# Patient Record
Sex: FEMALE | Race: WHITE | NOT HISPANIC OR LATINO | Employment: OTHER | URBAN - METROPOLITAN AREA
[De-identification: names, ages, dates, MRNs, and addresses within clinical notes are randomized per-mention and may not be internally consistent; named-entity substitution may affect disease eponyms.]

---

## 2017-12-06 ENCOUNTER — HISTORICAL (OUTPATIENT)
Dept: ADMINISTRATIVE | Facility: HOSPITAL | Age: 72
End: 2017-12-06

## 2018-06-28 ENCOUNTER — HISTORICAL (OUTPATIENT)
Dept: ADMINISTRATIVE | Facility: HOSPITAL | Age: 73
End: 2018-06-28

## 2018-06-30 LAB — FINAL CULTURE: NO GROWTH

## 2018-12-19 ENCOUNTER — HISTORICAL (OUTPATIENT)
Dept: ADMINISTRATIVE | Facility: HOSPITAL | Age: 73
End: 2018-12-19

## 2018-12-19 LAB
ALBUMIN SERPL-MCNC: 3.6 GM/DL (ref 3.4–5)
ALBUMIN/GLOB SERPL: 0.9 RATIO (ref 1.1–2)
ALP SERPL-CCNC: 101 UNIT/L (ref 38–126)
ALT SERPL-CCNC: 29 UNIT/L (ref 12–78)
AST SERPL-CCNC: 22 UNIT/L (ref 15–37)
BILIRUB SERPL-MCNC: 0.3 MG/DL (ref 0.2–1)
BILIRUBIN DIRECT+TOT PNL SERPL-MCNC: 0.1 MG/DL (ref 0–0.5)
BILIRUBIN DIRECT+TOT PNL SERPL-MCNC: 0.2 MG/DL (ref 0–0.8)
BUN SERPL-MCNC: 22 MG/DL (ref 7–18)
CALCIUM SERPL-MCNC: 9 MG/DL (ref 8.5–10.1)
CHLORIDE SERPL-SCNC: 102 MMOL/L (ref 98–107)
CHOLEST SERPL-MCNC: 372 MG/DL (ref 0–200)
CHOLEST/HDLC SERPL: 8.9 {RATIO} (ref 0–4)
CO2 SERPL-SCNC: 28 MMOL/L (ref 21–32)
CREAT SERPL-MCNC: 1.11 MG/DL (ref 0.55–1.02)
GLOBULIN SER-MCNC: 4 GM/DL (ref 2.4–3.5)
GLUCOSE SERPL-MCNC: 138 MG/DL (ref 74–106)
HDLC SERPL-MCNC: 42 MG/DL (ref 35–60)
LDLC SERPL CALC-MCNC: 252 MG/DL (ref 0–129)
POTASSIUM SERPL-SCNC: 4 MMOL/L (ref 3.5–5.1)
PROT SERPL-MCNC: 7.6 GM/DL (ref 6.4–8.2)
SODIUM SERPL-SCNC: 138 MMOL/L (ref 136–145)
T4 FREE SERPL-MCNC: 0.94 NG/DL (ref 0.76–1.46)
TRIGL SERPL-MCNC: 389 MG/DL (ref 30–150)
TSH SERPL-ACNC: 3.17 MIU/L (ref 0.36–3.74)
VLDLC SERPL CALC-MCNC: 78 MG/DL

## 2019-06-10 ENCOUNTER — HISTORICAL (OUTPATIENT)
Dept: ADMINISTRATIVE | Facility: HOSPITAL | Age: 74
End: 2019-06-10

## 2021-07-19 ENCOUNTER — HISTORICAL (OUTPATIENT)
Dept: ADMINISTRATIVE | Facility: HOSPITAL | Age: 76
End: 2021-07-19

## 2021-07-21 LAB — FINAL CULTURE: NO GROWTH

## 2021-08-04 ENCOUNTER — HISTORICAL (OUTPATIENT)
Dept: RADIOLOGY | Facility: HOSPITAL | Age: 76
End: 2021-08-04

## 2021-10-19 ENCOUNTER — HISTORICAL (OUTPATIENT)
Dept: PREADMISSION TESTING | Facility: HOSPITAL | Age: 76
End: 2021-10-19

## 2021-11-16 ENCOUNTER — HISTORICAL (OUTPATIENT)
Dept: PREADMISSION TESTING | Facility: HOSPITAL | Age: 76
End: 2021-11-16

## 2021-11-18 ENCOUNTER — HISTORICAL (OUTPATIENT)
Dept: ADMINISTRATIVE | Facility: HOSPITAL | Age: 76
End: 2021-11-18

## 2022-02-15 ENCOUNTER — HISTORICAL (OUTPATIENT)
Dept: ADMINISTRATIVE | Facility: HOSPITAL | Age: 77
End: 2022-02-15

## 2022-04-09 ENCOUNTER — HISTORICAL (OUTPATIENT)
Dept: ADMINISTRATIVE | Facility: HOSPITAL | Age: 77
End: 2022-04-09
Payer: MEDICARE

## 2022-04-25 VITALS
DIASTOLIC BLOOD PRESSURE: 78 MMHG | BODY MASS INDEX: 29.16 KG/M2 | HEIGHT: 65 IN | SYSTOLIC BLOOD PRESSURE: 122 MMHG | OXYGEN SATURATION: 95 % | WEIGHT: 175 LBS

## 2022-04-30 NOTE — OP NOTE
Patient:   Manisha Kirk            MRN: [id number]            FIN: [id number]               Age:   72 years     Sex:  Female     :  1945   Associated Diagnoses:   None   Author:   Venkatesh Nassar MD      Preoperative Diagnosis: Cataract Left eye    Postoperative Diagnosis: Cataract Left eye    Procedure: Phacoemulsification with intaocular lens implantations Left eye    Surgeon: Venkatesh Nassar MD    Assistant: Lindsay Hager, [facility]    Anestheisa: Topical    Complications: None    The patient was brought to the [facility] and positioned.  A surgical timeout, capsulotomy, lens fragmentation and limbal relaxing incisions were performed.  The patient was brought into the operating suite, where the patient was correctly identified as was the operative eye via timeout.  The patient was prepped and draped in a sterile ophthalmic fashion.  A lid speculum was placed in the operative eye and the microscope was brought into place.  A 1.0mm paracentesis was then made at (6) o'clock.  The anterior chamber was filled with Endocoat.  A (temporal) clear corneal incision was made with a 2.4 mm keratome.  A 6 mm corneal marking ring was used to dieter the cornea centered over the visual axis.  A 5.00 mm continuous curvilinear capsulorhexis was fashioned using a cystotome and microcapsular forceps.  Hydrodissection and hydrodelineation was performed with upreserved 1% Xylocaine.  The nucleus was then phacoemulsified with the Abbott phacoemulsification hand-piece with a total of (0) EFX.  The cortex was then removed with the Regan I/A hand-piece. An AKILAH lens model (ZCB00) with a power of (17.5) was placed in the capsular bag.  The Helon was then removed from the eye with the Regan I/A hand piece.  The anterior chamber was inflated and the wounds were hydrated with BSS.  The wounds were checked with Weck-Sue sponges and found to be watertight.  The lid speculum was removed and topical antibiotics were placed on the  operative eye.  The patient was brought to PACU in good condition.        Surgery Date 12/06/17 [facility]

## 2022-04-30 NOTE — OP NOTE
Patient:   Manisha Kirk            MRN: [id number]            FIN: [id number]               Age:   76 years     Sex:  Female     :  1945   Associated Diagnoses:   None   Author:   David Montoya MD            DATE OF SURGERY:   21    SURGEON:  David Montoya MD    PREOPERATIVE DIAGNOSIS:  Symptomatic Cholelithiasis    POST OPERATIVE DIAGNOSIS:  Same.    PROCEDURE:  Laparoscopic cholecystectomy.    ANESTHESIA:  General endotracheal anesthesia.    ESTIMATED BLOOD LOSS:  Approximately 20 cc.   Blood administered, none.    LAP AND INSTRUMENT COUNT:  Correct X2 at the end of the case.    SPECIMENS:  Gallbladder.    INDICATION AND SIGNIFICANT HISTORY:  Patient is a 76-year-old female complaining of post prandial right upper quadrant pain associated with fatty meals.  Patient was worked up clinically and found to have symptomatic cholelithiasis.  Risks and benefits of surgery was discussed with the patient who voiced understanding of risks / benefits and elected to proceed with surgery.        PROCEDURE IN DETAIL:  Once informed consents were obtained, patient was taken to the operating room and placed supine on the operating table.  After general endotracheal anesthesia was induced, the abdomen was prepped and draped in the standard sterile surgical fashion.  Periumbilical incision was made with the scalpel and the Veress needle was used to enter the abdominal cavity.  Pneumoperitoneum was then created with insufflation.  After adequate insufflation was obtained, 11 millimeter trocar port were placed through this wound.  Two additional 5 millimeter ports were placed in the right upper quadrant followed by 5 millimeter trocar placed subxiphoid.  The gallbladder was then visualized appeared to have chronic inflammatory changes and retracted both superiorly and laterally to achieve the critical view.  Dissection was carried out in lateral to medial fashion.  The cystic duct were first visualized  followed by cystic artery appropriate.  Two clips were placed twice proximally on each structure and one distally and then transected.  The gallbladder was then removed from the liver bed using the hook cautery and passed off the table as specimen through the periumbilical trocar port site.  Attention was then redirected to the gallbladder fossa, no active bleeding was noted.  Good hemostasis was visualized.  All ports were then removed under direct visualization with no active bleeding noted.  Skin was then closed with 4-0 Vicryl suture in interrupted fashion.  Skin was  cleaned and dry sterile dressing was placed on the wound.  Lap and instrument  counts were correct X2 at the end of the case.  Patient tolerated the procedure well and was transferred to recovery room in good condition.

## 2022-08-16 DIAGNOSIS — E03.9 HYPOTHYROIDISM, UNSPECIFIED TYPE: Primary | ICD-10-CM

## 2022-08-16 RX ORDER — LEVOTHYROXINE SODIUM 25 UG/1
25 TABLET ORAL DAILY
Qty: 90 TABLET | Refills: 0 | Status: SHIPPED | OUTPATIENT
Start: 2022-08-16 | End: 2023-08-08

## 2022-08-23 ENCOUNTER — TELEPHONE (OUTPATIENT)
Dept: INTERNAL MEDICINE | Facility: CLINIC | Age: 77
End: 2022-08-23
Payer: MEDICARE

## 2022-08-23 DIAGNOSIS — E78.5 HYPERLIPIDEMIA, UNSPECIFIED HYPERLIPIDEMIA TYPE: ICD-10-CM

## 2022-08-23 DIAGNOSIS — I10 HYPERTENSION, UNSPECIFIED TYPE: ICD-10-CM

## 2022-08-23 DIAGNOSIS — Z00.00 WELLNESS EXAMINATION: Primary | ICD-10-CM

## 2022-08-23 DIAGNOSIS — E66.9 OBESITY, UNSPECIFIED CLASSIFICATION, UNSPECIFIED OBESITY TYPE, UNSPECIFIED WHETHER SERIOUS COMORBIDITY PRESENT: ICD-10-CM

## 2022-08-23 DIAGNOSIS — E06.3 AUTOIMMUNE THYROIDITIS: ICD-10-CM

## 2022-08-23 DIAGNOSIS — R73.01 IFG (IMPAIRED FASTING GLUCOSE): ICD-10-CM

## 2022-08-23 NOTE — TELEPHONE ENCOUNTER
----- Message from Babs Acsota Patient Care Assistant sent at 8/23/2022 11:09 AM CDT -----  Regarding: RE: shemar wilhelm 8/30 @ 2:20  Pt. Confirmed appointment an fasting labs.  ----- Message -----  From: Edilberto John LPN  Sent: 8/23/2022  10:35 AM CDT  To: Babs Acosta Patient Care Assistant  Subject: shemar wilhelm 8/30 @ 2:20                          Are there any outstanding tasks in patient chart? Needs fasting labs    Is there documentation of outstanding tasks in patient chart? no    Has patient been to the ER, urgent care, or another physician since last visit?    Has patient done any blood work or x-rays since last visit?

## 2022-08-29 ENCOUNTER — LAB VISIT (OUTPATIENT)
Dept: LAB | Facility: HOSPITAL | Age: 77
End: 2022-08-29
Attending: INTERNAL MEDICINE
Payer: MEDICARE

## 2022-08-29 DIAGNOSIS — Z00.00 WELLNESS EXAMINATION: ICD-10-CM

## 2022-08-29 DIAGNOSIS — R73.01 IFG (IMPAIRED FASTING GLUCOSE): ICD-10-CM

## 2022-08-29 DIAGNOSIS — E78.5 HYPERLIPIDEMIA, UNSPECIFIED HYPERLIPIDEMIA TYPE: ICD-10-CM

## 2022-08-29 DIAGNOSIS — I10 HYPERTENSION, UNSPECIFIED TYPE: ICD-10-CM

## 2022-08-29 DIAGNOSIS — E66.9 OBESITY, UNSPECIFIED CLASSIFICATION, UNSPECIFIED OBESITY TYPE, UNSPECIFIED WHETHER SERIOUS COMORBIDITY PRESENT: ICD-10-CM

## 2022-08-29 DIAGNOSIS — E06.3 AUTOIMMUNE THYROIDITIS: ICD-10-CM

## 2022-08-29 DIAGNOSIS — K44.9 HIATAL HERNIA: Primary | ICD-10-CM

## 2022-08-29 LAB
ALBUMIN SERPL-MCNC: 3.8 GM/DL (ref 3.4–4.8)
ALBUMIN/GLOB SERPL: 1.1 RATIO (ref 1.1–2)
ALP SERPL-CCNC: 82 UNIT/L (ref 40–150)
ALT SERPL-CCNC: 16 UNIT/L (ref 0–55)
AST SERPL-CCNC: 19 UNIT/L (ref 5–34)
BASOPHILS # BLD AUTO: 0.06 X10(3)/MCL (ref 0–0.2)
BASOPHILS NFR BLD AUTO: 0.6 %
BILIRUBIN DIRECT+TOT PNL SERPL-MCNC: 0.4 MG/DL
BUN SERPL-MCNC: 24.5 MG/DL (ref 9.8–20.1)
CALCIUM SERPL-MCNC: 9.9 MG/DL (ref 8.4–10.2)
CHLORIDE SERPL-SCNC: 104 MMOL/L (ref 98–107)
CHOLEST SERPL-MCNC: 257 MG/DL
CHOLEST/HDLC SERPL: 5 {RATIO} (ref 0–5)
CO2 SERPL-SCNC: 24 MMOL/L (ref 23–31)
CREAT SERPL-MCNC: 1 MG/DL (ref 0.55–1.02)
EOSINOPHIL # BLD AUTO: 0.31 X10(3)/MCL (ref 0–0.9)
EOSINOPHIL NFR BLD AUTO: 3.3 %
ERYTHROCYTE [DISTWIDTH] IN BLOOD BY AUTOMATED COUNT: 12.6 % (ref 11.5–17)
EST. AVERAGE GLUCOSE BLD GHB EST-MCNC: 128.4 MG/DL
GFR SERPLBLD CREATININE-BSD FMLA CKD-EPI: 58 MLS/MIN/1.73/M2
GLOBULIN SER-MCNC: 3.6 GM/DL (ref 2.4–3.5)
GLUCOSE SERPL-MCNC: 133 MG/DL (ref 82–115)
HBA1C MFR BLD: 6.1 %
HCT VFR BLD AUTO: 45.2 % (ref 37–47)
HDLC SERPL-MCNC: 51 MG/DL (ref 35–60)
HGB BLD-MCNC: 15.3 GM/DL (ref 12–16)
IMM GRANULOCYTES # BLD AUTO: 0.05 X10(3)/MCL (ref 0–0.04)
IMM GRANULOCYTES NFR BLD AUTO: 0.5 %
LDLC SERPL CALC-MCNC: 136 MG/DL (ref 50–140)
LYMPHOCYTES # BLD AUTO: 3.11 X10(3)/MCL (ref 0.6–4.6)
LYMPHOCYTES NFR BLD AUTO: 32.8 %
MCH RBC QN AUTO: 30.1 PG (ref 27–31)
MCHC RBC AUTO-ENTMCNC: 33.8 MG/DL (ref 33–36)
MCV RBC AUTO: 88.8 FL (ref 80–94)
MONOCYTES # BLD AUTO: 0.68 X10(3)/MCL (ref 0.1–1.3)
MONOCYTES NFR BLD AUTO: 7.2 %
NEUTROPHILS # BLD AUTO: 5.3 X10(3)/MCL (ref 2.1–9.2)
NEUTROPHILS NFR BLD AUTO: 55.6 %
NRBC BLD AUTO-RTO: 0 %
PLATELET # BLD AUTO: 253 X10(3)/MCL (ref 130–400)
PMV BLD AUTO: 11 FL (ref 7.4–10.4)
POTASSIUM SERPL-SCNC: 4 MMOL/L (ref 3.5–5.1)
PROT SERPL-MCNC: 7.4 GM/DL (ref 5.8–7.6)
RBC # BLD AUTO: 5.09 X10(6)/MCL (ref 4.2–5.4)
SODIUM SERPL-SCNC: 138 MMOL/L (ref 136–145)
T4 FREE SERPL-MCNC: 0.86 NG/DL (ref 0.7–1.48)
TRIGL SERPL-MCNC: 350 MG/DL (ref 37–140)
TSH SERPL-ACNC: 3.94 UIU/ML (ref 0.35–4.94)
VLDLC SERPL CALC-MCNC: 70 MG/DL
WBC # SPEC AUTO: 9.5 X10(3)/MCL (ref 4.5–11.5)

## 2022-08-29 PROCEDURE — 36415 COLL VENOUS BLD VENIPUNCTURE: CPT

## 2022-08-29 PROCEDURE — 85025 COMPLETE CBC W/AUTO DIFF WBC: CPT

## 2022-08-29 PROCEDURE — 80053 COMPREHEN METABOLIC PANEL: CPT

## 2022-08-29 PROCEDURE — 84443 ASSAY THYROID STIM HORMONE: CPT

## 2022-08-29 PROCEDURE — 83036 HEMOGLOBIN GLYCOSYLATED A1C: CPT

## 2022-08-29 PROCEDURE — 80061 LIPID PANEL: CPT

## 2022-08-29 PROCEDURE — 84439 ASSAY OF FREE THYROXINE: CPT

## 2022-08-30 ENCOUNTER — OFFICE VISIT (OUTPATIENT)
Dept: INTERNAL MEDICINE | Facility: CLINIC | Age: 77
End: 2022-08-30
Payer: MEDICARE

## 2022-08-30 VITALS
HEIGHT: 64 IN | RESPIRATION RATE: 18 BRPM | OXYGEN SATURATION: 98 % | SYSTOLIC BLOOD PRESSURE: 130 MMHG | BODY MASS INDEX: 28.17 KG/M2 | WEIGHT: 165 LBS | DIASTOLIC BLOOD PRESSURE: 86 MMHG | HEART RATE: 81 BPM

## 2022-08-30 DIAGNOSIS — R10.13 EPIGASTRIC PAIN: ICD-10-CM

## 2022-08-30 DIAGNOSIS — R11.2 INTRACTABLE VOMITING WITH NAUSEA, UNSPECIFIED VOMITING TYPE: ICD-10-CM

## 2022-08-30 DIAGNOSIS — E03.9 HYPOTHYROIDISM, UNSPECIFIED TYPE: ICD-10-CM

## 2022-08-30 DIAGNOSIS — E78.5 HYPERLIPIDEMIA, UNSPECIFIED HYPERLIPIDEMIA TYPE: ICD-10-CM

## 2022-08-30 DIAGNOSIS — Z00.00 ANNUAL PHYSICAL EXAM: Primary | ICD-10-CM

## 2022-08-30 DIAGNOSIS — R11.0 NAUSEA: ICD-10-CM

## 2022-08-30 PROCEDURE — G0438 PPPS, INITIAL VISIT: HCPCS | Mod: ,,, | Performed by: INTERNAL MEDICINE

## 2022-08-30 PROCEDURE — G0438 PR WELCOME MEDICARE ANNUAL WELLNESS INITIAL VISIT: ICD-10-PCS | Mod: ,,, | Performed by: INTERNAL MEDICINE

## 2022-08-30 RX ORDER — SUCRALFATE 1 G/1
1 TABLET ORAL 4 TIMES DAILY
Qty: 28 TABLET | Refills: 0 | Status: SHIPPED | OUTPATIENT
Start: 2022-08-30 | End: 2022-09-06

## 2022-08-30 RX ORDER — PANTOPRAZOLE SODIUM 20 MG/1
20 TABLET, DELAYED RELEASE ORAL DAILY
COMMUNITY
Start: 2022-07-29 | End: 2022-10-27

## 2022-08-30 RX ORDER — ESTROGENS, CONJUGATED 0.45 MG/1
0.45 TABLET, FILM COATED ORAL DAILY
COMMUNITY
Start: 2022-07-21 | End: 2022-11-11

## 2022-08-30 RX ORDER — AMLODIPINE BESYLATE 5 MG/1
5 TABLET ORAL DAILY
COMMUNITY
Start: 2022-07-28

## 2022-08-30 RX ORDER — ONDANSETRON 4 MG/1
4 TABLET, FILM COATED ORAL EVERY 8 HOURS PRN
Qty: 20 TABLET | Refills: 0 | Status: SHIPPED | OUTPATIENT
Start: 2022-08-30 | End: 2023-12-07

## 2022-08-30 RX ORDER — DICLOFENAC SODIUM 50 MG/1
50 TABLET, DELAYED RELEASE ORAL
COMMUNITY
Start: 2022-02-04 | End: 2023-09-06

## 2022-08-30 NOTE — PROGRESS NOTES
Patient ID: Manisha Kirk is a 77 y.o. female.    Chief Complaint: Medicare AWV (Labs 8/29) and Nausea      Patient Care Team:  Piter Nolan II, MD as PCP - General (Internal Medicine)     HPI:   Patient presents here today for above reason.     Manisha is a 67-year-old female presented to the annual visit history hyperlipidemia placed on Crestor but her cholesterol down nicely although still not at goal but she has dropped her total cholesterol and also LDL approximately 120 points she is tolerating the medication currently.  Rest of her age-appropriate screening is up-to-date.  She does mention that she has been dealing with some chronic nausea for the last 3 or 4 months that began when her gallbladder was acting up.  She is status post lap shawn was doing well for couple months the symptoms do not return.  She has had a 15 lb weight loss because of the continued nausea have.  She is not weanable lot.  She is now complaining of some mid epigastric abdominal pain nonradiating.  She does have some nausea associated with dry heaves does not vomit any material.  Also tremendous amount of bloating and burping.    The patient's Health Maintenance was reviewed and the following appears to be due at this time:   Health Maintenance Due   Topic Date Due    Hepatitis C Screening  Never done    TETANUS VACCINE  Never done    Shingles Vaccine (1 of 2) Never done    COVID-19 Vaccine (3 - Booster for Pfizer series) 02/07/2022        Past Medical History:  Past Medical History:   Diagnosis Date    Autoimmune thyroiditis     HLD (hyperlipidemia)      Past Surgical History:   Procedure Laterality Date    CATARACT EXTRACTION      CHOLECYSTECTOMY      SURGICAL REMOVAL OF BONE SPUR      TONSILLECTOMY      TOTAL ABDOMINAL HYSTERECTOMY       Review of patient's allergies indicates:   Allergen Reactions    Codeine      Other reaction(s): Vomiting    Penicillins      Other reaction(s): Vomiting     Current Outpatient Medications  on File Prior to Visit   Medication Sig Dispense Refill    amLODIPine (NORVASC) 5 MG tablet Take 5 mg by mouth once daily.      levothyroxine (SYNTHROID) 25 MCG tablet Take 1 tablet (25 mcg total) by mouth once daily. 90 tablet 0    pantoprazole (PROTONIX) 20 MG tablet Take 20 mg by mouth once daily.      PREMARIN 0.45 mg tablet Take 0.45 mg by mouth once daily.      rosuvastatin (CRESTOR) 20 MG tablet TAKE 1 TABLET BY MOUTH DAILY 90 tablet 3    diclofenac (VOLTAREN) 50 MG EC tablet Take 50 mg by mouth.       No current facility-administered medications on file prior to visit.     Social History     Socioeconomic History    Marital status:    Tobacco Use    Smoking status: Never    Smokeless tobacco: Never   Substance and Sexual Activity    Alcohol use: Never    Drug use: Never     Social Determinants of Health     Financial Resource Strain: Low Risk     Difficulty of Paying Living Expenses: Not hard at all   Food Insecurity: No Food Insecurity    Worried About Running Out of Food in the Last Year: Never true    Ran Out of Food in the Last Year: Never true   Transportation Needs: No Transportation Needs    Lack of Transportation (Medical): No    Lack of Transportation (Non-Medical): No   Stress: No Stress Concern Present    Feeling of Stress : Not at all   Housing Stability: Low Risk     Unable to Pay for Housing in the Last Year: No    Number of Places Lived in the Last Year: 1    Unstable Housing in the Last Year: No     History reviewed. No pertinent family history.    ROS:   Review of Systems  Constitutional: No weight gain, No fever, No chills, No fatigue.   Eyes: No blurring, No visual disturbances.   Ear/Nose/Mouth/Throat: No decreased hearing, No ear pain, No nasal congestion, No sore throat.   Respiratory: No shortness of breath, No cough, No wheezing.   Cardiovascular: No chest pain, No palpitations, No peripheral edema.   Gastrointestinal: No nausea, No vomiting, No diarrhea, No constipation, No  abdominal pain.   Genitourinary: No dysuria, No hematuria.   Hematology/Lymphatics: No bruising tendency, No bleeding tendency, No swollen lymph glands.   Endocrine: No excessive thirst, No polyuria, No excessive hunger.   Musculoskeletal: No joint pain, No muscle pain, No decreased range of motion.   Integumentary: No rash, No pruritus.   Neurologic: No abnormal balance, No confusion, No headache.   Psychiatric: No anxiety, No depression, Not suicidal, No hallucinations.        Patient Reported Health Risk Assessment  What [age] your age?: 70-79  Are you male or female?: Female  During the past four weeks, how much have you been bothered by emotional problems such as feeling anxious, depressed, irritable, sad, or downhearted and blue?: Not at all  During the past five weeks, has your physical and/or emotional health limited your social activities with family, friends, neighbors, or groups?: Not at all  During the past four weeks, how much bodily pain have you generally had?: No pain  During the past four weeks, was someone available to help if you needed and wanted help?: Yes, as much as I wanted  During the past four weeks, what was the hardest physical activity you could do for at least two minutes?: Light  Can you get to places out of walking distance without help?  (For example, can you travel alone on buses or taxis, or drive your own car?): Yes  Can you go shopping for groceries or clothes without someone's help?: Yes  Can you prepare your own meals?: Yes  Can you do your own housework without help?: Yes  Because of any health problems, do you need the help of another person with your personal care needs such as eating, bathing, dressing, or getting around the house?: No  Can you handle your own money without help?: Yes  During the past four weeks, how would you rate your health in general?: Excellent  How have things been going for you during the past four weeks?: Very well  Are you having difficulties driving  "your car?: No  Do you always fasten your seat belt when you are in a car?: Yes, usually  How often in the past four weeks have you been bothered by falling or dizzy when standing up?: Never  How often in the past four weeks have you been bothered by sexual problems?: Never  How often in the past four weeks have you been bothered by trouble eating well?: Never  How often in the past four weeks have you been bothered by teeth or denture problems?: Never  How often in the past four weeks have you been bothered with problems using the telephone?: Never  How often in the past four weeks have you been bothered by tiredness or fatigue?: Never  Have you fallen two or more times in the past year?: No  Are you afraid of falling?: No  Are you a smoker?: No  During the past four weeks, how many drinks of wine, beer, or other alcoholic beverages did you have?: No alcohol at all  Do you exercise for about 20 minutes three or more days a week?: No, I usually do not exercise this much  Have you been given any information to help you with hazards in your house that might hurt you?: Yes  Have you been given any information to help you with keeping track of your medications?: Yes  How often do you have trouble taking medicines the way you've been told to take them?: I always take them as prescribed  How confident are you that you can control and manage most of your health problems?: Very confident  What is your race? (Check all that apply.):     Vitals/PE:   /86 (BP Location: Left arm, Patient Position: Sitting)   Pulse 81   Resp 18   Ht 5' 4" (1.626 m)   Wt 74.8 kg (165 lb)   SpO2 98%   BMI 28.32 kg/m²   Physical Exam    General: Alert and oriented, No acute distress.   Eye: Normal conjunctiva without exudate.  HENMT: Normocephalic/AT, Normal hearing, Oral mucosa is moist and pink   Neck: No goiter visualized.   Respiratory: Lungs CTAB, Respirations are non-labored, Breath sounds are equal, Symmetrical chest wall " "expansion.  Cardiovascular: Normal rate, Regular rhythm, No murmur, No edema.   Gastrointestinal: Non-distended.   Genitourinary: Deferred.  Musculoskeletal: Normal ROM, Normal gait, No deformities or amputations.  Integumentary: Warm, Dry, Intact. No diaphoresis, or flushing.  Neurologic: No focal deficits, Cranial Nerves II-XII are grossly intact.   Psychiatric: Cooperative, Appropriate mood & affect, Normal judgment, Non-suicidal.        No flowsheet data found.  Fall Risk Assessment - Outpatient 8/30/2022   Mobility Status Ambulatory   Number of falls 0   Identified as fall risk 0           Depression Screening  Over the past two weeks, has the patient felt down, depressed, or hopeless?: No  Over the past two weeks, has the patient felt little interest or pleasure in doing things?: No  Functional Ability/Safety Screening  Was the patient's timed Up & Go test unsteady or longer than 30 seconds?: No  Does the patient need help with phone, transportation, shopping, preparing meals, housework, laundry, meds, or managing money?: No  Does the patient's home have rugs in the hallway, lack grab bars in the bathroom, lack handrails on the stairs or have poor lighting?: No  Have you noticed any hearing difficulties?: No  A "Yes" response to any of the above questions should trigger further investigation.: 0  Cognitive Function (Assessed through direct observation with due consideration of information obtained by way of patient reports and/or concerns raised by family, friends, caretakers, or others)    Does the patient repeat questions/statements in the same day?: No  Does the patient have trouble remembering the date, year, and time?: No  Does the patient have difficulty managing finances?: No  Does the patient have a decreased sense of direction?: No  A "Yes" response to any of the above questions could indicate mild cognitive impairment and should trigger further investigation.: 0    Assessment/Plan:       1. Annual " physical exam    2. Hypothyroidism, unspecified type    3. Hyperlipidemia, unspecified hyperlipidemia type    4. Nausea      Plan:  Cpmm  If lipids not at goal next visit, will incrase statin to 40mg  Will send to GI  Would like to get gastric emptying study, but pt wants to see GI  Carafate prn  +/- US/CT abd if GI endoscopy is neg    Education and counseling done face to face regarding medical conditions and plan. Contact office if new symptoms develop. Should any symptoms ever significantly worsen seek emergency medical attention/go to ER. Follow up at least yearly for wellness or sooner PRN. Nurse to call patient with any results. The patient is receptive, expresses understanding and is agreeable to plan. All questions have been answered.    No follow-ups on file.      Medicare Annual Wellness and Personalized Prevention Plan:   Fall Risk + Home Safety + Hearing Impairment + Depression Screen + Cognitive Impairment Screen + Health Risk Assessment all reviewed.    Advance Care Planning   I attest to discussing Advance Care Planning with patient and/or family member.  Education was provided including the importance of the Health Care Power of , Advance Directives, and/or [place] documentation.  The patient expressed understanding to the importance of this information and discussion.  Length of ACP conversation in minutes:

## 2022-09-08 ENCOUNTER — TELEPHONE (OUTPATIENT)
Dept: INTERNAL MEDICINE | Facility: CLINIC | Age: 77
End: 2022-09-08
Payer: MEDICARE

## 2022-09-08 NOTE — TELEPHONE ENCOUNTER
----- Message from Dolores Jamil MA sent at 9/8/2022  8:36 AM CDT -----  Referral order entered to Dr. Patric Whitney. Pt notified. -LB

## 2022-09-13 DIAGNOSIS — K44.9 HIATAL HERNIA: Primary | ICD-10-CM

## 2022-12-05 PROBLEM — Z00.00 ANNUAL PHYSICAL EXAM: Status: RESOLVED | Noted: 2022-08-30 | Resolved: 2022-12-05

## 2023-05-10 DIAGNOSIS — E78.5 HYPERLIPIDEMIA, UNSPECIFIED HYPERLIPIDEMIA TYPE: Primary | ICD-10-CM

## 2023-05-10 RX ORDER — ROSUVASTATIN CALCIUM 20 MG/1
TABLET, COATED ORAL
Qty: 90 TABLET | Refills: 3 | Status: SHIPPED | OUTPATIENT
Start: 2023-05-10 | End: 2023-12-07

## 2023-05-17 ENCOUNTER — LAB VISIT (OUTPATIENT)
Dept: LAB | Facility: HOSPITAL | Age: 78
End: 2023-05-17
Attending: INTERNAL MEDICINE
Payer: MEDICARE

## 2023-05-17 DIAGNOSIS — R94.31 NONSPECIFIC ABNORMAL ELECTROCARDIOGRAM (ECG) (EKG): Primary | ICD-10-CM

## 2023-05-17 DIAGNOSIS — I10 ESSENTIAL HYPERTENSION, MALIGNANT: ICD-10-CM

## 2023-05-17 DIAGNOSIS — E78.5 HYPERLIPIDEMIA, UNSPECIFIED HYPERLIPIDEMIA TYPE: ICD-10-CM

## 2023-05-17 LAB
ALBUMIN SERPL-MCNC: 3.5 G/DL (ref 3.4–4.8)
ALBUMIN/GLOB SERPL: 1.1 RATIO (ref 1.1–2)
ALP SERPL-CCNC: 70 UNIT/L (ref 40–150)
ALT SERPL-CCNC: 12 UNIT/L (ref 0–55)
AST SERPL-CCNC: 18 UNIT/L (ref 5–34)
BILIRUBIN DIRECT+TOT PNL SERPL-MCNC: 0.4 MG/DL
BUN SERPL-MCNC: 21.8 MG/DL (ref 9.8–20.1)
CALCIUM SERPL-MCNC: 9.5 MG/DL (ref 8.4–10.2)
CHLORIDE SERPL-SCNC: 104 MMOL/L (ref 98–107)
CHOLEST SERPL-MCNC: 263 MG/DL
CHOLEST/HDLC SERPL: 5 {RATIO} (ref 0–5)
CO2 SERPL-SCNC: 26 MMOL/L (ref 23–31)
CREAT SERPL-MCNC: 1.1 MG/DL (ref 0.55–1.02)
ERYTHROCYTE [DISTWIDTH] IN BLOOD BY AUTOMATED COUNT: 12.6 % (ref 11.5–17)
GFR SERPLBLD CREATININE-BSD FMLA CKD-EPI: 52 MLS/MIN/1.73/M2
GLOBULIN SER-MCNC: 3.3 GM/DL (ref 2.4–3.5)
GLUCOSE SERPL-MCNC: 133 MG/DL (ref 82–115)
HCT VFR BLD AUTO: 42.6 % (ref 37–47)
HDLC SERPL-MCNC: 54 MG/DL (ref 35–60)
HGB BLD-MCNC: 14 G/DL (ref 12–16)
LDLC SERPL CALC-MCNC: 153 MG/DL (ref 50–140)
MCH RBC QN AUTO: 29.9 PG (ref 27–31)
MCHC RBC AUTO-ENTMCNC: 32.9 G/DL (ref 33–36)
MCV RBC AUTO: 91 FL (ref 80–94)
NRBC BLD AUTO-RTO: 0 %
PLATELET # BLD AUTO: 242 X10(3)/MCL (ref 130–400)
PMV BLD AUTO: 10.6 FL (ref 7.4–10.4)
POTASSIUM SERPL-SCNC: 4.7 MMOL/L (ref 3.5–5.1)
PROT SERPL-MCNC: 6.8 GM/DL (ref 5.8–7.6)
RBC # BLD AUTO: 4.68 X10(6)/MCL (ref 4.2–5.4)
SODIUM SERPL-SCNC: 137 MMOL/L (ref 136–145)
TRIGL SERPL-MCNC: 280 MG/DL (ref 37–140)
TSH SERPL-ACNC: 4.42 UIU/ML (ref 0.35–4.94)
VLDLC SERPL CALC-MCNC: 56 MG/DL
WBC # SPEC AUTO: 6.32 X10(3)/MCL (ref 4.5–11.5)

## 2023-05-17 PROCEDURE — 83701 LIPOPROTEIN BLD HR FRACTION: CPT | Mod: 90

## 2023-05-17 PROCEDURE — 84443 ASSAY THYROID STIM HORMONE: CPT

## 2023-05-17 PROCEDURE — 85027 COMPLETE CBC AUTOMATED: CPT

## 2023-05-17 PROCEDURE — 80061 LIPID PANEL: CPT

## 2023-05-17 PROCEDURE — 36415 COLL VENOUS BLD VENIPUNCTURE: CPT

## 2023-05-17 PROCEDURE — 80053 COMPREHEN METABOLIC PANEL: CPT

## 2023-05-19 LAB — LDLC SERPL.ULTRACENT-MCNC: 154 MG/DL

## 2023-08-08 DIAGNOSIS — E03.9 HYPOTHYROIDISM, UNSPECIFIED TYPE: ICD-10-CM

## 2023-08-08 RX ORDER — LEVOTHYROXINE SODIUM 25 UG/1
25 TABLET ORAL
Qty: 90 TABLET | Refills: 0 | Status: SHIPPED | OUTPATIENT
Start: 2023-08-08 | End: 2023-11-06

## 2023-08-30 ENCOUNTER — TELEPHONE (OUTPATIENT)
Dept: INTERNAL MEDICINE | Facility: CLINIC | Age: 78
End: 2023-08-30
Payer: MEDICARE

## 2023-08-30 DIAGNOSIS — I10 HYPERTENSION, UNSPECIFIED TYPE: ICD-10-CM

## 2023-08-30 DIAGNOSIS — E78.5 HYPERLIPIDEMIA, UNSPECIFIED HYPERLIPIDEMIA TYPE: ICD-10-CM

## 2023-08-30 DIAGNOSIS — Z00.00 ANNUAL PHYSICAL EXAM: Primary | ICD-10-CM

## 2023-08-30 DIAGNOSIS — E06.3 AUTOIMMUNE THYROIDITIS: ICD-10-CM

## 2023-08-30 DIAGNOSIS — R73.01 IFG (IMPAIRED FASTING GLUCOSE): ICD-10-CM

## 2023-08-30 DIAGNOSIS — E03.9 HYPOTHYROIDISM, UNSPECIFIED TYPE: ICD-10-CM

## 2023-08-30 DIAGNOSIS — E66.9 OBESITY, UNSPECIFIED CLASSIFICATION, UNSPECIFIED OBESITY TYPE, UNSPECIFIED WHETHER SERIOUS COMORBIDITY PRESENT: ICD-10-CM

## 2023-08-30 NOTE — TELEPHONE ENCOUNTER
----- Message from Edilberto John LPN sent at 8/30/2023  7:35 AM CDT -----  Regarding: shemar to 9/6 @1:40  Are there any outstanding tasks in patient chart?needs fasting labs    Is there documentation of outstanding tasks in patient chart? no    Has patient been to the ER, urgent care, or another physician since last visit?    Has patient done any blood work or x-rays since last visit?

## 2023-09-05 ENCOUNTER — LAB VISIT (OUTPATIENT)
Dept: LAB | Facility: HOSPITAL | Age: 78
End: 2023-09-05
Attending: INTERNAL MEDICINE
Payer: MEDICARE

## 2023-09-05 DIAGNOSIS — E06.3 AUTOIMMUNE THYROIDITIS: ICD-10-CM

## 2023-09-05 DIAGNOSIS — R73.01 IFG (IMPAIRED FASTING GLUCOSE): ICD-10-CM

## 2023-09-05 DIAGNOSIS — E78.5 HYPERLIPIDEMIA, UNSPECIFIED HYPERLIPIDEMIA TYPE: ICD-10-CM

## 2023-09-05 DIAGNOSIS — E66.9 OBESITY, UNSPECIFIED CLASSIFICATION, UNSPECIFIED OBESITY TYPE, UNSPECIFIED WHETHER SERIOUS COMORBIDITY PRESENT: ICD-10-CM

## 2023-09-05 DIAGNOSIS — I10 HYPERTENSION, UNSPECIFIED TYPE: ICD-10-CM

## 2023-09-05 DIAGNOSIS — E03.9 HYPOTHYROIDISM, UNSPECIFIED TYPE: ICD-10-CM

## 2023-09-05 DIAGNOSIS — Z00.00 ANNUAL PHYSICAL EXAM: ICD-10-CM

## 2023-09-05 LAB
ALBUMIN SERPL-MCNC: 3.6 G/DL (ref 3.4–4.8)
ALBUMIN/GLOB SERPL: 1.1 RATIO (ref 1.1–2)
ALP SERPL-CCNC: 63 UNIT/L (ref 40–150)
ALT SERPL-CCNC: 11 UNIT/L (ref 0–55)
AST SERPL-CCNC: 17 UNIT/L (ref 5–34)
BASOPHILS # BLD AUTO: 0.05 X10(3)/MCL
BASOPHILS NFR BLD AUTO: 0.7 %
BILIRUB SERPL-MCNC: 0.5 MG/DL
BUN SERPL-MCNC: 18.2 MG/DL (ref 9.8–20.1)
CALCIUM SERPL-MCNC: 8.9 MG/DL (ref 8.4–10.2)
CHLORIDE SERPL-SCNC: 109 MMOL/L (ref 98–107)
CHOLEST SERPL-MCNC: 247 MG/DL
CHOLEST/HDLC SERPL: 4 {RATIO} (ref 0–5)
CO2 SERPL-SCNC: 25 MMOL/L (ref 23–31)
CREAT SERPL-MCNC: 1.06 MG/DL (ref 0.55–1.02)
EOSINOPHIL # BLD AUTO: 0.41 X10(3)/MCL (ref 0–0.9)
EOSINOPHIL NFR BLD AUTO: 5.4 %
ERYTHROCYTE [DISTWIDTH] IN BLOOD BY AUTOMATED COUNT: 13.2 % (ref 11.5–17)
EST. AVERAGE GLUCOSE BLD GHB EST-MCNC: 119.8 MG/DL
GFR SERPLBLD CREATININE-BSD FMLA CKD-EPI: 54 MLS/MIN/1.73/M2
GLOBULIN SER-MCNC: 3.3 GM/DL (ref 2.4–3.5)
GLUCOSE SERPL-MCNC: 127 MG/DL (ref 82–115)
HBA1C MFR BLD: 5.8 %
HCT VFR BLD AUTO: 42.9 % (ref 37–47)
HDLC SERPL-MCNC: 56 MG/DL (ref 35–60)
HGB BLD-MCNC: 14.3 G/DL (ref 12–16)
IMM GRANULOCYTES # BLD AUTO: 0.02 X10(3)/MCL (ref 0–0.04)
IMM GRANULOCYTES NFR BLD AUTO: 0.3 %
LDLC SERPL CALC-MCNC: 148 MG/DL (ref 50–140)
LYMPHOCYTES # BLD AUTO: 3.23 X10(3)/MCL (ref 0.6–4.6)
LYMPHOCYTES NFR BLD AUTO: 42.2 %
MCH RBC QN AUTO: 30.8 PG (ref 27–31)
MCHC RBC AUTO-ENTMCNC: 33.3 G/DL (ref 33–36)
MCV RBC AUTO: 92.3 FL (ref 80–94)
MONOCYTES # BLD AUTO: 0.74 X10(3)/MCL (ref 0.1–1.3)
MONOCYTES NFR BLD AUTO: 9.7 %
NEUTROPHILS # BLD AUTO: 3.2 X10(3)/MCL (ref 2.1–9.2)
NEUTROPHILS NFR BLD AUTO: 41.7 %
NRBC BLD AUTO-RTO: 0 %
PLATELET # BLD AUTO: 231 X10(3)/MCL (ref 130–400)
PMV BLD AUTO: 10.4 FL (ref 7.4–10.4)
POTASSIUM SERPL-SCNC: 3.9 MMOL/L (ref 3.5–5.1)
PROT SERPL-MCNC: 6.9 GM/DL (ref 5.8–7.6)
RBC # BLD AUTO: 4.65 X10(6)/MCL (ref 4.2–5.4)
SODIUM SERPL-SCNC: 141 MMOL/L (ref 136–145)
T4 FREE SERPL-MCNC: 1 NG/DL (ref 0.7–1.48)
TRIGL SERPL-MCNC: 215 MG/DL (ref 37–140)
TSH SERPL-ACNC: 4.04 UIU/ML (ref 0.35–4.94)
VLDLC SERPL CALC-MCNC: 43 MG/DL
WBC # SPEC AUTO: 7.65 X10(3)/MCL (ref 4.5–11.5)

## 2023-09-05 PROCEDURE — 83036 HEMOGLOBIN GLYCOSYLATED A1C: CPT

## 2023-09-05 PROCEDURE — 85025 COMPLETE CBC W/AUTO DIFF WBC: CPT

## 2023-09-05 PROCEDURE — 84443 ASSAY THYROID STIM HORMONE: CPT

## 2023-09-05 PROCEDURE — 36415 COLL VENOUS BLD VENIPUNCTURE: CPT

## 2023-09-05 PROCEDURE — 80061 LIPID PANEL: CPT

## 2023-09-05 PROCEDURE — 80053 COMPREHEN METABOLIC PANEL: CPT

## 2023-09-05 PROCEDURE — 84439 ASSAY OF FREE THYROXINE: CPT

## 2023-09-06 ENCOUNTER — OFFICE VISIT (OUTPATIENT)
Dept: INTERNAL MEDICINE | Facility: CLINIC | Age: 78
End: 2023-09-06
Payer: MEDICARE

## 2023-09-06 VITALS
BODY MASS INDEX: 28.17 KG/M2 | HEIGHT: 64 IN | OXYGEN SATURATION: 99 % | RESPIRATION RATE: 18 BRPM | WEIGHT: 165 LBS | SYSTOLIC BLOOD PRESSURE: 122 MMHG | DIASTOLIC BLOOD PRESSURE: 78 MMHG | HEART RATE: 63 BPM

## 2023-09-06 DIAGNOSIS — E78.2 MIXED HYPERLIPIDEMIA: ICD-10-CM

## 2023-09-06 DIAGNOSIS — N18.31 CHRONIC KIDNEY DISEASE, STAGE 3A: ICD-10-CM

## 2023-09-06 DIAGNOSIS — Z12.31 OTHER SCREENING MAMMOGRAM: ICD-10-CM

## 2023-09-06 DIAGNOSIS — Z00.00 ANNUAL PHYSICAL EXAM: Primary | ICD-10-CM

## 2023-09-06 DIAGNOSIS — R11.0 NAUSEA: ICD-10-CM

## 2023-09-06 DIAGNOSIS — E03.2 HYPOTHYROIDISM DUE TO NON-MEDICATION EXOGENOUS SUBSTANCES: ICD-10-CM

## 2023-09-06 DIAGNOSIS — I70.0 AORTIC ATHEROSCLEROSIS: ICD-10-CM

## 2023-09-06 PROCEDURE — G0439 PPPS, SUBSEQ VISIT: HCPCS | Mod: ,,, | Performed by: INTERNAL MEDICINE

## 2023-09-06 PROCEDURE — G0439 PR MEDICARE ANNUAL WELLNESS SUBSEQUENT VISIT: ICD-10-PCS | Mod: ,,, | Performed by: INTERNAL MEDICINE

## 2023-09-06 RX ORDER — PROCHLORPERAZINE MALEATE 10 MG
10 TABLET ORAL EVERY 6 HOURS PRN
Qty: 30 TABLET | Refills: 1 | Status: ON HOLD | OUTPATIENT
Start: 2023-09-06 | End: 2023-12-11 | Stop reason: HOSPADM

## 2023-09-06 RX ORDER — CARVEDILOL 6.25 MG/1
6.25 TABLET ORAL 2 TIMES DAILY WITH MEALS
COMMUNITY

## 2023-09-06 NOTE — PROGRESS NOTES
Patient ID: Manisha Kirk is a 78 y.o. female.    Chief Complaint: Medicare AWV (Labs 9/5, urinary frequency denies odor, burning) and Nausea (Every morning)      HPI:   Patient presents here today for above reason.     Ms. Dyer is a 70-year-old female presenting today annual visit.  History hyperlipidemia controlled on Crestor corrected hypothyroidism on 20/5 mics of Synthroid.  She is on Coreg and Norvasc for blood pressure.  She is having a little lower extremity edema likely from the Norvasc.  Rest of her age-appropriate screening is up-to-date she needs mammogram done.  She is complaining of daily morning nausea.  Only nauseated just in the morning and last to about mid morning.  She only eats milk and Kai crackers at night she does have some dry heaves but no vomiting this has been going on for 1 year this has been going on every single day.  Zofran does not seem to help does not have any abdominal pain except for in the right upper quadrant nonradiating.  No nausea associated with food just happens in the morning.  She has actually developed a little anxiety of waking up in the morning now no excessive weight loss or night sweats.  However she does have an episode when she gets up in the morning of a flushing sensation that lasts for about 5 or 10 minutes and then the nausea N/C use.    The patient's Health Maintenance was reviewed and the following appears to be due at this time:   Health Maintenance Due   Topic Date Due    Hepatitis C Screening  Never done    TETANUS VACCINE  Never done    DEXA Scan  Never done    Shingles Vaccine (1 of 2) Never done    Pneumococcal Vaccines (Age 65+) (1 - PCV) Never done    COVID-19 Vaccine (3 - Pfizer series) 11/02/2021    Influenza Vaccine (1) 09/01/2023        Past Medical History:  Past Medical History:   Diagnosis Date    Autoimmune thyroiditis     HLD (hyperlipidemia)      Past Surgical History:   Procedure Laterality Date    CATARACT EXTRACTION       CHOLECYSTECTOMY      SURGICAL REMOVAL OF BONE SPUR      TONSILLECTOMY      TOTAL ABDOMINAL HYSTERECTOMY       Review of patient's allergies indicates:   Allergen Reactions    Codeine      Other reaction(s): Vomiting    Penicillins      Other reaction(s): Vomiting     Current Outpatient Medications on File Prior to Visit   Medication Sig Dispense Refill    amLODIPine (NORVASC) 5 MG tablet Take 5 mg by mouth once daily.      carvediloL (COREG) 6.25 MG tablet Take 6.25 mg by mouth 2 (two) times daily with meals.      estrogens, conjugated, (PREMARIN) 0.45 MG tablet TAKE 1 TABLET BY MOUTH DAILY 90 tablet 2    levothyroxine (SYNTHROID) 25 MCG tablet TAKE 1 TABLET BY MOUTH DAILY 90 tablet 0    ondansetron (ZOFRAN) 4 MG tablet Take 1 tablet (4 mg total) by mouth every 8 (eight) hours as needed for Nausea. 20 tablet 0    pantoprazole (PROTONIX) 20 MG tablet TAKE 1 TABLET(20 MG) BY MOUTH EVERY DAY 90 tablet 3    rosuvastatin (CRESTOR) 20 MG tablet TAKE 1 TABLET BY MOUTH DAILY 90 tablet 3    [DISCONTINUED] diclofenac (VOLTAREN) 50 MG EC tablet Take 50 mg by mouth.       No current facility-administered medications on file prior to visit.     Social History     Socioeconomic History    Marital status:    Tobacco Use    Smoking status: Never    Smokeless tobacco: Never   Substance and Sexual Activity    Alcohol use: Never    Drug use: Never     Social Determinants of Health     Financial Resource Strain: Low Risk  (8/30/2022)    Overall Financial Resource Strain (CARDIA)     Difficulty of Paying Living Expenses: Not hard at all   Food Insecurity: No Food Insecurity (8/30/2022)    Hunger Vital Sign     Worried About Running Out of Food in the Last Year: Never true     Ran Out of Food in the Last Year: Never true   Transportation Needs: No Transportation Needs (8/30/2022)    PRAPARE - Transportation     Lack of Transportation (Medical): No     Lack of Transportation (Non-Medical): No   Stress: No Stress Concern Present  "(8/30/2022)    [employer] [place] [employer] - Occupational Stress Questionnaire     Feeling of Stress : Not at all   Housing Stability: Low Risk  (8/30/2022)    Housing Stability Vital Sign     Unable to Pay for Housing in the Last Year: No     Number of Places Lived in the Last Year: 1     Unstable Housing in the Last Year: No     No family history on file.    ROS:   Comprehensive review of systems was performed and is negative except as noted above    Vitals/PE:   /78 (BP Location: Left arm, Patient Position: Sitting)   Pulse 63   Resp 18   Ht 5' 4" (1.626 m)   Wt 74.8 kg (165 lb)   SpO2 99%   BMI 28.32 kg/m²   Physical Exam    General: Alert and oriented, No acute distress.   Eye: Normal conjunctiva without exudate.  HENMT: Normocephalic/AT, Normal hearing, Oral mucosa is moist and pink   Neck: No goiter visualized.   Respiratory: Lungs CTAB, Respirations are non-labored, Breath sounds are equal, Symmetrical chest wall expansion.  Cardiovascular: Normal rate, Regular rhythm, No murmur, No edema.   Gastrointestinal: Non-distended.   Genitourinary: Deferred.  Musculoskeletal: Normal ROM, Normal gait, No deformities or amputations.  Integumentary: Warm, Dry, Intact. No diaphoresis, or flushing.  Neurologic: No focal deficits, Cranial Nerves II-XII are grossly intact.   Psychiatric: Cooperative, Appropriate mood & affect, Normal judgment, Non-suicidal.    Assessment/Plan:       1. Annual physical exam   Screenin guptodate  2. Mixed hyperlipidemia   statin  3. Hypothyroidism due to non-medication exogenous substances   corrected  4. Nausea   Zofran is not working will give her a trial of Compazine.  Ultrasound of the abdomen since she does have a little right upper quadrant abdominal pain.  She is had her gallbladder taken out recently and also had EGD which was unrevealing.  This nausea is daily only in the morning goes away on its own currently she is asymptomatic.  Only dry heaves no bilious " or undigested food on EGD she did have a small hiatal hernia if this workup is negative will workup other etiologies for nausea which would be a stretch including neuroendocrine workup.  Only because she does have an episode of flushing/flashing in the morning.   5. Aortic atherosclerosis   statin  6. Chronic kidney disease, stage 3a   Improved  7. Other screening mammogram   mmg           Education and counseling done face to face regarding medical conditions and plan. Contact office if new symptoms develop. Should any symptoms ever significantly worsen seek emergency medical attention/go to ER. Follow up at least yearly for wellness or sooner PRN. Nurse to call patient with any results. The patient is receptive, expresses understanding and is agreeable to plan. All questions have been answered.    No follow-ups on file.

## 2023-09-11 ENCOUNTER — TELEPHONE (OUTPATIENT)
Dept: INTERNAL MEDICINE | Facility: CLINIC | Age: 78
End: 2023-09-11
Payer: MEDICARE

## 2023-09-11 DIAGNOSIS — N20.0 KIDNEY STONE: Primary | ICD-10-CM

## 2023-09-11 NOTE — TELEPHONE ENCOUNTER
----- Message from Magda Vogel sent at 9/11/2023 11:47 AM CDT -----  .Type:  Test Results    Who Called: pt  Name of Test (Lab/Mammo/Etc): u/s  Date of Test: last Friday  Ordering Provider: Ovidio  Where the test was performed:   Would the patient rather a call back or a response via [facility]?   Best Call Back Number: [phone]  Additional Information:  calling for results

## 2023-10-05 LAB — BCS RECOMMENDATION EXT: NORMAL

## 2023-10-17 ENCOUNTER — DOCUMENTATION ONLY (OUTPATIENT)
Dept: INTERNAL MEDICINE | Facility: CLINIC | Age: 78
End: 2023-10-17
Payer: MEDICARE

## 2023-12-04 ENCOUNTER — TELEPHONE (OUTPATIENT)
Dept: INTERNAL MEDICINE | Facility: CLINIC | Age: 78
End: 2023-12-04
Payer: MEDICARE

## 2023-12-04 NOTE — TELEPHONE ENCOUNTER
----- Message from Olinda Hills sent at 12/4/2023  8:10 AM CST -----  Regarding: call back  .Type:  Needs Medical Advice    Who Called: pt  Symptoms (please be specific): hand numb   How long has patient had these symptoms:    Pharmacy name and phone #:    Would the patient rather a call back or a response via [facility]? Call back  Best Call Back Number: [phone]  Additional Information: pt requesting a call back

## 2023-12-04 NOTE — TELEPHONE ENCOUNTER
Advised pt to go to urgent care, no availability with MD or NP. Transferred pt to ext [phone] to schedule next available appt with NP

## 2023-12-07 ENCOUNTER — HOSPITAL ENCOUNTER (INPATIENT)
Facility: HOSPITAL | Age: 78
LOS: 4 days | Discharge: HOME | DRG: 252 | End: 2023-12-11
Attending: STUDENT IN AN ORGANIZED HEALTH CARE EDUCATION/TRAINING PROGRAM | Admitting: INTERNAL MEDICINE
Payer: MEDICARE

## 2023-12-07 ENCOUNTER — ANESTHESIA (OUTPATIENT)
Dept: SURGERY | Facility: HOSPITAL | Age: 78
DRG: 252 | End: 2023-12-07
Payer: MEDICARE

## 2023-12-07 ENCOUNTER — OFFICE VISIT (OUTPATIENT)
Dept: INTERNAL MEDICINE | Facility: CLINIC | Age: 78
End: 2023-12-07
Payer: MEDICARE

## 2023-12-07 ENCOUNTER — ANESTHESIA EVENT (OUTPATIENT)
Dept: SURGERY | Facility: HOSPITAL | Age: 78
DRG: 252 | End: 2023-12-07
Payer: MEDICARE

## 2023-12-07 VITALS
WEIGHT: 164 LBS | HEIGHT: 64 IN | DIASTOLIC BLOOD PRESSURE: 84 MMHG | OXYGEN SATURATION: 99 % | BODY MASS INDEX: 28 KG/M2 | HEART RATE: 59 BPM | SYSTOLIC BLOOD PRESSURE: 124 MMHG

## 2023-12-07 DIAGNOSIS — I99.8 LIMB ISCHEMIA: ICD-10-CM

## 2023-12-07 DIAGNOSIS — I73.9 PERIPHERAL ARTERIAL DISEASE: ICD-10-CM

## 2023-12-07 DIAGNOSIS — M79.603 ARM PAIN: ICD-10-CM

## 2023-12-07 DIAGNOSIS — I63.512 ACUTE CEREBROVASCULAR ACCIDENT (CVA) DUE TO OCCLUSION OF LEFT MIDDLE CEREBRAL ARTERY: ICD-10-CM

## 2023-12-07 DIAGNOSIS — M46.1 INFLAMMATION OF SACROILIAC JOINT: ICD-10-CM

## 2023-12-07 DIAGNOSIS — I51.3 THROMBUS IN HEART CHAMBER: ICD-10-CM

## 2023-12-07 DIAGNOSIS — I63.9 CVA (CEREBRAL VASCULAR ACCIDENT): ICD-10-CM

## 2023-12-07 DIAGNOSIS — I69.320 CVA, OLD, APHASIA: ICD-10-CM

## 2023-12-07 DIAGNOSIS — I74.2: Primary | ICD-10-CM

## 2023-12-07 DIAGNOSIS — R23.1 PALLOR OF EXTREMITY: ICD-10-CM

## 2023-12-07 DIAGNOSIS — I63.9 STROKE: ICD-10-CM

## 2023-12-07 DIAGNOSIS — R20.0 HAND NUMBNESS: Primary | ICD-10-CM

## 2023-12-07 PROBLEM — E66.9 OBESITY: Status: ACTIVE | Noted: 2023-12-07

## 2023-12-07 PROBLEM — E06.3 AUTOIMMUNE THYROIDITIS: Status: ACTIVE | Noted: 2023-12-07

## 2023-12-07 PROBLEM — I10 HYPERTENSION: Status: ACTIVE | Noted: 2023-12-07

## 2023-12-07 PROBLEM — E78.2 MIXED HYPERLIPIDEMIA: Status: ACTIVE | Noted: 2022-08-30

## 2023-12-07 PROBLEM — H26.9 CATARACT OF BOTH EYES: Status: ACTIVE | Noted: 2023-12-07

## 2023-12-07 PROBLEM — G43.909 MIGRAINE HEADACHE: Status: ACTIVE | Noted: 2023-12-07

## 2023-12-07 LAB
ALBUMIN SERPL-MCNC: 3.7 G/DL (ref 3.4–4.8)
ALBUMIN/GLOB SERPL: 0.9 RATIO (ref 1.1–2)
ALP SERPL-CCNC: 93 UNIT/L (ref 40–150)
ALT SERPL-CCNC: 15 UNIT/L (ref 0–55)
AST SERPL-CCNC: 18 UNIT/L (ref 5–34)
BASOPHILS # BLD AUTO: 0.04 X10(3)/MCL
BASOPHILS NFR BLD AUTO: 0.4 %
BILIRUB SERPL-MCNC: 0.4 MG/DL
BUN SERPL-MCNC: 21.1 MG/DL (ref 9.8–20.1)
CALCIUM SERPL-MCNC: 9.5 MG/DL (ref 8.4–10.2)
CHLORIDE SERPL-SCNC: 104 MMOL/L (ref 98–107)
CO2 SERPL-SCNC: 24 MMOL/L (ref 23–31)
CREAT SERPL-MCNC: 0.95 MG/DL (ref 0.55–1.02)
EOSINOPHIL # BLD AUTO: 0.26 X10(3)/MCL (ref 0–0.9)
EOSINOPHIL NFR BLD AUTO: 2.6 %
ERYTHROCYTE [DISTWIDTH] IN BLOOD BY AUTOMATED COUNT: 12.1 % (ref 11.5–17)
GFR SERPLBLD CREATININE-BSD FMLA CKD-EPI: >60 MLS/MIN/1.73/M2
GLOBULIN SER-MCNC: 4.1 GM/DL (ref 2.4–3.5)
GLUCOSE SERPL-MCNC: 126 MG/DL (ref 82–115)
HCT VFR BLD AUTO: 43.8 % (ref 37–47)
HGB BLD-MCNC: 15.2 G/DL (ref 12–16)
IMM GRANULOCYTES # BLD AUTO: 0.02 X10(3)/MCL (ref 0–0.04)
IMM GRANULOCYTES NFR BLD AUTO: 0.2 %
INR PPP: 0.9
LYMPHOCYTES # BLD AUTO: 2.56 X10(3)/MCL (ref 0.6–4.6)
LYMPHOCYTES NFR BLD AUTO: 25.8 %
MCH RBC QN AUTO: 30.6 PG (ref 27–31)
MCHC RBC AUTO-ENTMCNC: 34.7 G/DL (ref 33–36)
MCV RBC AUTO: 88.3 FL (ref 80–94)
MONOCYTES # BLD AUTO: 0.79 X10(3)/MCL (ref 0.1–1.3)
MONOCYTES NFR BLD AUTO: 8 %
NEUTROPHILS # BLD AUTO: 6.25 X10(3)/MCL (ref 2.1–9.2)
NEUTROPHILS NFR BLD AUTO: 63 %
NRBC BLD AUTO-RTO: 0 %
PLATELET # BLD AUTO: 265 X10(3)/MCL (ref 130–400)
PMV BLD AUTO: 10.5 FL (ref 7.4–10.4)
POTASSIUM SERPL-SCNC: 3.5 MMOL/L (ref 3.5–5.1)
PROT SERPL-MCNC: 7.8 GM/DL (ref 5.8–7.6)
PROTHROMBIN TIME: 12.2 SECONDS (ref 12.5–14.5)
RBC # BLD AUTO: 4.96 X10(6)/MCL (ref 4.2–5.4)
SODIUM SERPL-SCNC: 139 MMOL/L (ref 136–145)
TROPONIN I SERPL-MCNC: 0.07 NG/ML (ref 0–0.04)
TROPONIN I SERPL-MCNC: 0.08 NG/ML (ref 0–0.04)
WBC # SPEC AUTO: 9.92 X10(3)/MCL (ref 4.5–11.5)

## 2023-12-07 PROCEDURE — 85610 PROTHROMBIN TIME: CPT | Performed by: NURSE PRACTITIONER

## 2023-12-07 PROCEDURE — 63600175 PHARM REV CODE 636 W HCPCS

## 2023-12-07 PROCEDURE — 27201423 OPTIME MED/SURG SUP & DEVICES STERILE SUPPLY: Performed by: SURGERY

## 2023-12-07 PROCEDURE — 80053 COMPREHEN METABOLIC PANEL: CPT | Performed by: NURSE PRACTITIONER

## 2023-12-07 PROCEDURE — 36000707: Performed by: SURGERY

## 2023-12-07 PROCEDURE — D9220A PRA ANESTHESIA: Mod: CRNA,,,

## 2023-12-07 PROCEDURE — C1757 CATH, THROMBECTOMY/EMBOLECT: HCPCS | Performed by: SURGERY

## 2023-12-07 PROCEDURE — D9220A PRA ANESTHESIA: ICD-10-PCS | Mod: ANES,,, | Performed by: ANESTHESIOLOGY

## 2023-12-07 PROCEDURE — 93010 EKG 12-LEAD: ICD-10-PCS | Mod: ,,, | Performed by: INTERNAL MEDICINE

## 2023-12-07 PROCEDURE — 63600175 PHARM REV CODE 636 W HCPCS: Performed by: SURGERY

## 2023-12-07 PROCEDURE — 37000009 HC ANESTHESIA EA ADD 15 MINS: Performed by: SURGERY

## 2023-12-07 PROCEDURE — 99285 EMERGENCY DEPT VISIT HI MDM: CPT | Mod: 25

## 2023-12-07 PROCEDURE — 93010 ELECTROCARDIOGRAM REPORT: CPT | Mod: ,,, | Performed by: INTERNAL MEDICINE

## 2023-12-07 PROCEDURE — 71000033 HC RECOVERY, INTIAL HOUR: Performed by: SURGERY

## 2023-12-07 PROCEDURE — 93005 ELECTROCARDIOGRAM TRACING: CPT

## 2023-12-07 PROCEDURE — D9220A PRA ANESTHESIA: ICD-10-PCS | Mod: CRNA,,,

## 2023-12-07 PROCEDURE — 84484 ASSAY OF TROPONIN QUANT: CPT | Performed by: NURSE PRACTITIONER

## 2023-12-07 PROCEDURE — 99214 OFFICE O/P EST MOD 30 MIN: CPT | Mod: ,,, | Performed by: NURSE PRACTITIONER

## 2023-12-07 PROCEDURE — 63600175 PHARM REV CODE 636 W HCPCS: Performed by: STUDENT IN AN ORGANIZED HEALTH CARE EDUCATION/TRAINING PROGRAM

## 2023-12-07 PROCEDURE — 25000003 PHARM REV CODE 250

## 2023-12-07 PROCEDURE — 85025 COMPLETE CBC W/AUTO DIFF WBC: CPT | Performed by: NURSE PRACTITIONER

## 2023-12-07 PROCEDURE — 11000001 HC ACUTE MED/SURG PRIVATE ROOM

## 2023-12-07 PROCEDURE — 84484 ASSAY OF TROPONIN QUANT: CPT | Performed by: STUDENT IN AN ORGANIZED HEALTH CARE EDUCATION/TRAINING PROGRAM

## 2023-12-07 PROCEDURE — 25500020 PHARM REV CODE 255: Performed by: INTERNAL MEDICINE

## 2023-12-07 PROCEDURE — 37000008 HC ANESTHESIA 1ST 15 MINUTES: Performed by: SURGERY

## 2023-12-07 PROCEDURE — D9220A PRA ANESTHESIA: Mod: ANES,,, | Performed by: ANESTHESIOLOGY

## 2023-12-07 PROCEDURE — 36000706: Performed by: SURGERY

## 2023-12-07 PROCEDURE — 88304 TISSUE EXAM BY PATHOLOGIST: CPT | Performed by: SURGERY

## 2023-12-07 PROCEDURE — 99214 PR OFFICE/OUTPT VISIT, EST, LEVL IV, 30-39 MIN: ICD-10-PCS | Mod: ,,, | Performed by: NURSE PRACTITIONER

## 2023-12-07 PROCEDURE — 25000003 PHARM REV CODE 250: Performed by: STUDENT IN AN ORGANIZED HEALTH CARE EDUCATION/TRAINING PROGRAM

## 2023-12-07 RX ORDER — PROPOFOL 10 MG/ML
VIAL (ML) INTRAVENOUS
Status: DISCONTINUED | OUTPATIENT
Start: 2023-12-07 | End: 2023-12-15

## 2023-12-07 RX ORDER — MORPHINE SULFATE 4 MG/ML
4 INJECTION, SOLUTION INTRAMUSCULAR; INTRAVENOUS EVERY 4 HOURS PRN
Status: DISCONTINUED | OUTPATIENT
Start: 2023-12-07 | End: 2023-12-11 | Stop reason: HOSPADM

## 2023-12-07 RX ORDER — LIDOCAINE HYDROCHLORIDE 20 MG/ML
INJECTION, SOLUTION EPIDURAL; INFILTRATION; INTRACAUDAL; PERINEURAL
Status: DISCONTINUED | OUTPATIENT
Start: 2023-12-07 | End: 2023-12-15

## 2023-12-07 RX ORDER — SODIUM CHLORIDE 0.9 % (FLUSH) 0.9 %
10 SYRINGE (ML) INJECTION
Status: DISCONTINUED | OUTPATIENT
Start: 2023-12-07 | End: 2023-12-11 | Stop reason: HOSPADM

## 2023-12-07 RX ORDER — FAMOTIDINE 20 MG/1
20 TABLET, FILM COATED ORAL DAILY
Status: DISCONTINUED | OUTPATIENT
Start: 2023-12-08 | End: 2023-12-11 | Stop reason: HOSPADM

## 2023-12-07 RX ORDER — TALC
6 POWDER (GRAM) TOPICAL NIGHTLY PRN
Status: DISCONTINUED | OUTPATIENT
Start: 2023-12-07 | End: 2023-12-11 | Stop reason: HOSPADM

## 2023-12-07 RX ORDER — HEPARIN SODIUM,PORCINE/D5W 25000/250
0-24 INTRAVENOUS SOLUTION INTRAVENOUS CONTINUOUS
Status: DISCONTINUED | OUTPATIENT
Start: 2023-12-07 | End: 2023-12-08

## 2023-12-07 RX ORDER — GLYCOPYRROLATE 0.2 MG/ML
INJECTION INTRAMUSCULAR; INTRAVENOUS
Status: DISCONTINUED | OUTPATIENT
Start: 2023-12-07 | End: 2023-12-15

## 2023-12-07 RX ORDER — CARVEDILOL 3.12 MG/1
6.25 TABLET ORAL 2 TIMES DAILY WITH MEALS
Status: DISCONTINUED | OUTPATIENT
Start: 2023-12-08 | End: 2023-12-11 | Stop reason: HOSPADM

## 2023-12-07 RX ORDER — CEFAZOLIN SODIUM 2 G/50ML
2 SOLUTION INTRAVENOUS ONCE
Status: COMPLETED | OUTPATIENT
Start: 2023-12-07 | End: 2023-12-07

## 2023-12-07 RX ORDER — LORAZEPAM 1 MG/1
1 TABLET ORAL
Status: COMPLETED | OUTPATIENT
Start: 2023-12-07 | End: 2023-12-07

## 2023-12-07 RX ORDER — HEPARIN SODIUM 5000 [USP'U]/ML
5000 INJECTION, SOLUTION INTRAVENOUS; SUBCUTANEOUS EVERY 8 HOURS
Status: DISCONTINUED | OUTPATIENT
Start: 2023-12-08 | End: 2023-12-08

## 2023-12-07 RX ORDER — PROTAMINE SULFATE 10 MG/ML
INJECTION, SOLUTION INTRAVENOUS
Status: DISCONTINUED | OUTPATIENT
Start: 2023-12-07 | End: 2023-12-15

## 2023-12-07 RX ORDER — SODIUM CHLORIDE 9 MG/ML
INJECTION, SOLUTION INTRAVENOUS CONTINUOUS
Status: DISCONTINUED | OUTPATIENT
Start: 2023-12-08 | End: 2023-12-08

## 2023-12-07 RX ORDER — PHENYLEPHRINE HCL IN 0.9% NACL 1 MG/10 ML
SYRINGE (ML) INTRAVENOUS
Status: DISCONTINUED | OUTPATIENT
Start: 2023-12-07 | End: 2023-12-15

## 2023-12-07 RX ORDER — MORPHINE SULFATE 4 MG/ML
2 INJECTION, SOLUTION INTRAMUSCULAR; INTRAVENOUS EVERY 4 HOURS PRN
Status: DISCONTINUED | OUTPATIENT
Start: 2023-12-07 | End: 2023-12-11 | Stop reason: HOSPADM

## 2023-12-07 RX ORDER — ROCURONIUM BROMIDE 10 MG/ML
INJECTION, SOLUTION INTRAVENOUS
Status: DISCONTINUED | OUTPATIENT
Start: 2023-12-07 | End: 2023-12-15

## 2023-12-07 RX ORDER — NAPROXEN SODIUM 220 MG/1
81 TABLET, FILM COATED ORAL DAILY
Status: DISCONTINUED | OUTPATIENT
Start: 2023-12-08 | End: 2023-12-09

## 2023-12-07 RX ORDER — HEPARIN SODIUM 5000 [USP'U]/ML
INJECTION, SOLUTION INTRAVENOUS; SUBCUTANEOUS
Status: DISCONTINUED | OUTPATIENT
Start: 2023-12-07 | End: 2023-12-07 | Stop reason: HOSPADM

## 2023-12-07 RX ORDER — HEPARIN SODIUM 1000 [USP'U]/ML
INJECTION, SOLUTION INTRAVENOUS; SUBCUTANEOUS
Status: DISCONTINUED | OUTPATIENT
Start: 2023-12-07 | End: 2023-12-15

## 2023-12-07 RX ORDER — ONDANSETRON 2 MG/ML
4 INJECTION INTRAMUSCULAR; INTRAVENOUS EVERY 8 HOURS PRN
Status: DISCONTINUED | OUTPATIENT
Start: 2023-12-07 | End: 2023-12-11 | Stop reason: HOSPADM

## 2023-12-07 RX ORDER — ONDANSETRON HYDROCHLORIDE 2 MG/ML
INJECTION, SOLUTION INTRAMUSCULAR; INTRAVENOUS
Status: DISCONTINUED | OUTPATIENT
Start: 2023-12-07 | End: 2023-12-15

## 2023-12-07 RX ORDER — LEVOTHYROXINE SODIUM 25 UG/1
25 TABLET ORAL
Status: DISCONTINUED | OUTPATIENT
Start: 2023-12-08 | End: 2023-12-11 | Stop reason: HOSPADM

## 2023-12-07 RX ORDER — ONDANSETRON 8 MG/1
8 TABLET, ORALLY DISINTEGRATING ORAL EVERY 8 HOURS PRN
COMMUNITY
Start: 2023-08-25 | End: 2024-02-26

## 2023-12-07 RX ORDER — FENTANYL CITRATE 50 UG/ML
INJECTION, SOLUTION INTRAMUSCULAR; INTRAVENOUS
Status: DISCONTINUED | OUTPATIENT
Start: 2023-12-07 | End: 2023-12-15

## 2023-12-07 RX ORDER — EPHEDRINE SULFATE 50 MG/ML
INJECTION, SOLUTION INTRAVENOUS
Status: DISCONTINUED | OUTPATIENT
Start: 2023-12-07 | End: 2023-12-15

## 2023-12-07 RX ADMIN — LIDOCAINE HYDROCHLORIDE 40 MG: 20 INJECTION, SOLUTION EPIDURAL; INFILTRATION; INTRACAUDAL; PERINEURAL at 08:12

## 2023-12-07 RX ADMIN — FENTANYL CITRATE 50 MCG: 50 INJECTION, SOLUTION INTRAMUSCULAR; INTRAVENOUS at 08:12

## 2023-12-07 RX ADMIN — EPHEDRINE SULFATE 10 MG: 50 INJECTION INTRAVENOUS at 09:12

## 2023-12-07 RX ADMIN — FENTANYL CITRATE 50 MCG: 50 INJECTION, SOLUTION INTRAMUSCULAR; INTRAVENOUS at 09:12

## 2023-12-07 RX ADMIN — EPHEDRINE SULFATE 15 MG: 50 INJECTION INTRAVENOUS at 09:12

## 2023-12-07 RX ADMIN — IOPAMIDOL 100 ML: 755 INJECTION, SOLUTION INTRAVENOUS at 07:12

## 2023-12-07 RX ADMIN — PROPOFOL 120 MG: 10 INJECTION, EMULSION INTRAVENOUS at 08:12

## 2023-12-07 RX ADMIN — HEPARIN SODIUM 18 UNITS/KG/HR: 10000 INJECTION, SOLUTION INTRAVENOUS at 07:12

## 2023-12-07 RX ADMIN — PROTAMINE SULFATE 30 MG: 10 INJECTION, SOLUTION INTRAVENOUS at 10:12

## 2023-12-07 RX ADMIN — HEPARIN SODIUM 5000 UNITS: 1000 INJECTION, SOLUTION INTRAVENOUS; SUBCUTANEOUS at 09:12

## 2023-12-07 RX ADMIN — LORAZEPAM 1 MG: 1 TABLET ORAL at 07:12

## 2023-12-07 RX ADMIN — CEFAZOLIN SODIUM 2 G: 2 SOLUTION INTRAVENOUS at 09:12

## 2023-12-07 RX ADMIN — SUGAMMADEX 200 MG: 100 INJECTION, SOLUTION INTRAVENOUS at 10:12

## 2023-12-07 RX ADMIN — ONDANSETRON 4 MG: 2 INJECTION INTRAMUSCULAR; INTRAVENOUS at 09:12

## 2023-12-07 RX ADMIN — Medication 200 MCG: at 09:12

## 2023-12-07 RX ADMIN — SODIUM CHLORIDE, SODIUM GLUCONATE, SODIUM ACETATE, POTASSIUM CHLORIDE AND MAGNESIUM CHLORIDE: 526; 502; 368; 37; 30 INJECTION, SOLUTION INTRAVENOUS at 09:12

## 2023-12-07 RX ADMIN — GLYCOPYRROLATE 0.2 MG: 0.2 INJECTION INTRAMUSCULAR; INTRAVENOUS at 09:12

## 2023-12-07 RX ADMIN — SODIUM CHLORIDE, SODIUM GLUCONATE, SODIUM ACETATE, POTASSIUM CHLORIDE AND MAGNESIUM CHLORIDE: 526; 502; 368; 37; 30 INJECTION, SOLUTION INTRAVENOUS at 08:12

## 2023-12-07 RX ADMIN — ROCURONIUM BROMIDE 50 MG: 10 SOLUTION INTRAVENOUS at 08:12

## 2023-12-07 NOTE — PROGRESS NOTES
"Subjective:      Patient ID: Manisha ARAIZA Kirk is a 78 y.o. female.    Chief Complaint: Numbness (Right hand-5 days)      HPI: Patient here today for c/o hand numbness x 5 days. States that she experienced a bout of pallor to R hand at onset of s/s, which resolved. S/s exacerbated by lying on R side in bed.  R hand is cold. She is R hand dominant. Denies h/o CVD, CP, palpitations, Sob, or neck problems. Inc weakness to R hand with dexterity issues. No swelling or redness in RUE. H/o R shoulder dislocation with occ discomforts.    Review of patient's allergies indicates:   Allergen Reactions    Codeine      Other reaction(s): Vomiting    Penicillins      Other reaction(s): Vomiting       Review of Systems  Constitutional: No fever, No chills, No sweats, No fatigue  Respiratory: No shortness of breath, No exertional dyspnea.   Cardiovascular: No chest pain, No palpitations, No claudication, No orthopnea, No peripheral edema.  Musculoskeletal: No joint pain, No muscle pain.  Integumentary: No rash, No ecchymosis. H/o RUE pallor  Neurologic: No altered mental status, No headaches. H/o R hand numbness    Objective:   Visit Vitals  /84   Pulse (!) 59   Ht 5' 4" (1.626 m)   Wt 74.4 kg (164 lb)   SpO2 99%   BMI 28.15 kg/m²     The patient's weight trend is below:   Wt Readings from Last 4 Encounters:   12/07/23 74.4 kg (164 lb)   09/06/23 74.8 kg (165 lb)   08/30/22 74.8 kg (165 lb)   07/22/21 79.4 kg (174 lb 15.7 oz)        Physical Exam  Vitals and nursing note reviewed.   Constitutional:       General: She is not in acute distress.     Appearance: Normal appearance. She is normal weight. She is not ill-appearing, toxic-appearing or diaphoretic.   HENT:      Head: Normocephalic and atraumatic.   Cardiovascular:      Rate and Rhythm: Normal rate and regular rhythm.      Pulses:           Radial pulses are 1+ on the right side and 1+ on the left side.      Heart sounds: Normal heart sounds.   Pulmonary:      Effort: " Pulmonary effort is normal.      Breath sounds: Normal breath sounds.   Skin:     General: Skin is warm and dry.      Capillary Refill: Capillary refill takes less than 2 seconds.   Neurological:      General: No focal deficit present.      Mental Status: She is alert and oriented to person, place, and time. Mental status is at baseline.         Assessment/Plan:   1. Hand numbness  US arterial RUE  XR R shoulder and neck  Consider further eval with EMG RUE or MRI neck    - US Upper Extremity Arteries Right; Future  - X-ray Shoulder 2 or More Views Right; Future  - X-Ray Cervical Spine 2 or 3 Views; Future    2. Pallor of extremity  See #1    - US Upper Extremity Arteries Right; Future  - X-ray Shoulder 2 or More Views Right; Future  - X-Ray Cervical Spine 2 or 3 Views; Future    3. Inflammation of sacroiliac joint  Denies s/s at present  Tylenol as needed      Medication List with Changes/Refills   Current Medications    AMLODIPINE (NORVASC) 5 MG TABLET    Take 5 mg by mouth once daily.    CARVEDILOL (COREG) 6.25 MG TABLET    Take 6.25 mg by mouth 2 (two) times daily with meals.    ESTROGENS, CONJUGATED, (PREMARIN) 0.45 MG TABLET    TAKE 1 TABLET BY MOUTH DAILY    LEVOTHYROXINE (SYNTHROID) 25 MCG TABLET    TAKE 1 TABLET BY MOUTH DAILY    ONDANSETRON (ZOFRAN-ODT) 8 MG TBDL    Take 8 mg by mouth every 8 (eight) hours as needed.    PANTOPRAZOLE (PROTONIX) 20 MG TABLET    TAKE 1 TABLET(20 MG) BY MOUTH EVERY DAY    PROCHLORPERAZINE (COMPAZINE) 10 MG TABLET    Take 1 tablet (10 mg total) by mouth every 6 (six) hours as needed (nausea).   Discontinued Medications    ONDANSETRON (ZOFRAN) 4 MG TABLET    Take 1 tablet (4 mg total) by mouth every 8 (eight) hours as needed for Nausea.    ROSUVASTATIN (CRESTOR) 20 MG TABLET    TAKE 1 TABLET BY MOUTH DAILY        No follow-ups on file.    Chemistry:  Lab Results   Component Value Date     09/05/2023    K 3.9 09/05/2023    CHLORIDE 109 (H) 09/05/2023    BUN 18.2 09/05/2023  "   CREATININE 1.06 (H) 09/05/2023    EGFRNORACEVR 54 09/05/2023    GLUCOSE 127 (H) 09/05/2023    CALCIUM 8.9 09/05/2023    ALKPHOS 63 09/05/2023    LABPROT 6.9 09/05/2023    ALBUMIN 3.6 09/05/2023    BILIDIR 0.10 12/19/2018    IBILI 0.20 12/19/2018    AST 17 09/05/2023    ALT 11 09/05/2023        Lab Results   Component Value Date    HGBA1C 5.8 09/05/2023        Hematology:  Lab Results   Component Value Date    WBC 7.65 09/05/2023    HGB 14.3 09/05/2023    HCT 42.9 09/05/2023     09/05/2023       Lipid Panel:  Lab Results   Component Value Date    CHOL 247 (H) 09/05/2023    HDL 56 09/05/2023    .00 (H) 09/05/2023    TRIG 215 (H) 09/05/2023    TOTALCHOLEST 4 09/05/2023        Urine:  No results found for: "COLORUA", "APPEARANCEUA", "SGUA", "PHUA", "PROTEINUA", "GLUCOSEUA", "KETONESUA", "BLOODUA", "NITRITESUA", "LEUKOCYTESUR", "RBCUA", "WBCUA", "BACTERIA", "SQEPUA", "HYALINECASTS", "CREATRANDUR", "PROTEINURINE", "UPROTCREA"     "

## 2023-12-07 NOTE — ED PROVIDER NOTES
Encounter Date: 12/7/2023    SCRIBE #1 NOTE: I, Sapna Ramirez, am scribing for, and in the presence of,  Asim Landers MD. I have scribed the following portions of the note - Other sections scribed: HPI, ROS, PE, MDM.       History     Chief Complaint   Patient presents with    Hand Problem     RIGHT ARM NUMBNESS AND COLD, DR FINK SENT HERE AFTER MRI SHOWED DISTAL RIGHT BRACHIAL ARTERY     78 year old female with a history of HLD, HTN, and hysterectomy presents to ED after her PCP called and told her an outpatient US showed some blockage in her right brachial artery. She reports numbness in her right hand of Saturday.  Pt denies any history of blockages.      The history is provided by the patient.     Review of patient's allergies indicates:   Allergen Reactions    Codeine      Other reaction(s): Vomiting    Penicillins      Other reaction(s): Vomiting     Past Medical History:   Diagnosis Date    Autoimmune thyroiditis     HLD (hyperlipidemia)     Hypertension      Past Surgical History:   Procedure Laterality Date    CATARACT EXTRACTION      CHOLECYSTECTOMY      SURGICAL REMOVAL OF BONE SPUR      TONSILLECTOMY      TOTAL ABDOMINAL HYSTERECTOMY       History reviewed. No pertinent family history.  Social History     Tobacco Use    Smoking status: Never    Smokeless tobacco: Never   Substance Use Topics    Alcohol use: Never    Drug use: Never     Review of Systems   Constitutional:  Negative for chills and fever.   HENT:  Negative for congestion, drooling and sore throat.    Eyes:  Negative for pain and visual disturbance.   Respiratory:  Negative for chest tightness, shortness of breath and wheezing.    Cardiovascular:  Negative for chest pain, palpitations and leg swelling.   Gastrointestinal:  Negative for abdominal pain, nausea and vomiting.   Genitourinary:  Negative for dysuria and hematuria.   Musculoskeletal:  Negative for back pain, neck pain and neck stiffness.   Skin:  Negative for  pallor and rash.   Neurological:  Positive for weakness (right hand). Negative for numbness.   Hematological:  Does not bruise/bleed easily.       Physical Exam     Initial Vitals [12/07/23 1742]   BP Pulse Resp Temp SpO2   (!) 176/96 86 20 98 °F (36.7 °C) 96 %      MAP       --         Physical Exam    Nursing note and vitals reviewed.  Constitutional: She appears well-developed and well-nourished. She is not diaphoretic. No distress.   HENT:   Head: Normocephalic and atraumatic.   Nose: Nose normal.   Mouth/Throat: Oropharynx is clear and moist.   Eyes: EOM are normal. Pupils are equal, round, and reactive to light.   Neck: Neck supple.   Normal range of motion.  Cardiovascular:  Normal rate and regular rhythm.           No murmur heard.  No palpable radial pulse on right. Right A/C is warm.  Right hand is cold compared to the left.    Pulmonary/Chest: Breath sounds normal. No respiratory distress. She has no wheezes. She has no rales.   Abdominal: Abdomen is soft. She exhibits no distension. There is no abdominal tenderness.   Musculoskeletal:      Cervical back: Normal range of motion and neck supple.     Neurological: She is alert and oriented to person, place, and time. She has normal strength. No cranial nerve deficit or sensory deficit.   Right arm is neurologically intact. Normal finger cross, A OK, and thumbs up.   Skin: Skin is warm. Capillary refill takes less than 2 seconds. No rash noted.         ED Course   Critical Care    Date/Time: 12/7/2023 7:00 PM    Performed by: Asim Landers MD  Authorized by: Asim Landers MD  Direct patient critical care time: 35 minutes  Total critical care time (exclusive of procedural time) : 35 minutes  Critical care time was exclusive of separately billable procedures and treating other patients.  Critical care was necessary to treat or prevent imminent or life-threatening deterioration of the following conditions: circulatory failure.  Critical care was  time spent personally by me on the following activities: development of treatment plan with patient or surrogate, discussions with consultants, evaluation of patient's response to treatment, discussions with primary provider, examination of patient, obtaining history from patient or surrogate, ordering and performing treatments and interventions, ordering and review of laboratory studies, ordering and review of radiographic studies, re-evaluation of patient's condition, pulse oximetry and review of old charts.        Labs Reviewed   COMPREHENSIVE METABOLIC PANEL - Abnormal; Notable for the following components:       Result Value    Glucose Level 126 (*)     Blood Urea Nitrogen 21.1 (*)     Protein Total 7.8 (*)     Globulin 4.1 (*)     Albumin/Globulin Ratio 0.9 (*)     All other components within normal limits   TROPONIN I - Abnormal; Notable for the following components:    Troponin-I 0.067 (*)     All other components within normal limits   PROTIME-INR - Abnormal; Notable for the following components:    PT 12.2 (*)     All other components within normal limits   CBC WITH DIFFERENTIAL - Abnormal; Notable for the following components:    MPV 10.5 (*)     All other components within normal limits   TROPONIN I - Abnormal; Notable for the following components:    Troponin-I 0.078 (*)     All other components within normal limits   CBC W/ AUTO DIFFERENTIAL    Narrative:     The following orders were created for panel order CBC Auto Differential.  Procedure                               Abnormality         Status                     ---------                               -----------         ------                     CBC with Differential[[id number]]       Abnormal            Final result                 Please view results for these tests on the individual orders.        ECG Results              EKG 12-lead (Final result)  Result time 12/08/23 00:42:11      Final result by Interface, Lab In [facility] (12/08/23  00:42:11)                   Narrative:    Test Reason : M79.603,    Vent. Rate : 061 BPM     Atrial Rate : 061 BPM     P-R Int : 160 ms          QRS Dur : 090 ms      QT Int : 460 ms       P-R-T Axes : 072 071 142 degrees     QTc Int : 463 ms    Normal sinus rhythm  Minimal voltage criteria for LVH, may be normal variant ( Sokolow-Fleming )  ST and T wave abnormality, consider inferior ischemia  ST and T wave abnormality, consider anterolateral ischemia  Abnormal ECG  Confirmed by Todd Condon MD ([id number]) on 12/8/2023 12:41:59 AM    Referred By:             Confirmed By:Todd Condon MD                                  Imaging Results               CTA Upper Extremity Right (Final result)  Result time 12/07/23 19:40:37      Final result by Yaron Mckinney MD (12/07/23 19:40:37)                   Narrative:    EXAMINATION  CTA UPPER EXTREMITY RIGHT    CLINICAL HISTORY  decreased R brachial arterial flow, evaluate for aneurysm formation;    TECHNIQUE  Pre- and post-contrast helical-acquisition CT images of the right upper extremity were obtained, contrast injection timed to coincide with arterial opacification for purposes of CT angiography.  Multiplanar reconstructions, to include MIP and volume rendered (3-D), were accomplished by a CT technologist at a separate workstation, pushed to PACS for physician review.    CONTRAST  IV: ISOVUE-370, 100 mL    COMPARISON  None available at the time of initial interpretation.    FINDINGS  Images were reviewed in soft tissue and bone windows.    Exam quality: Overall limited secondary to suboptimal patient positioning and beam attenuation artifact produced by the extremity being adjacent to the patient's torso.    Vasculature: Included mediastinal vascular structures are unremarkable appearance.  There is normal intraluminal contrast opacification, course, and caliber of the pancreas of Ramon and visualized right carotid system.  Similar normal CT appearance of the right subclavian and  axillary arteries.  The axillary branch vessels are unremarkable.  Normal contrast opacification is appreciated to the level of the distal humerus, with abrupt angiographic cut off at the distal brachial artery (series 14, images 146-152; series 19, image 31; series 21, image 24).  The subsequent acquisition of the extremity demonstrates no delayed intraluminal contrast beyond this region.  However, there is delayed collateral flow reconstitution to the hand but overall diminished appearance of typical intraluminal contrast opacification.  No definite focal vascular contour abnormality or aneurysmal dilatation is identified.    Nonvascular: Regional subcutaneous tissues, musculature, and osseous structures are unremarkable.  There is no acute or focal abnormality of the visualized right lung or abdominopelvic structures.    IMPRESSION  1. Abrupt angiographic cut off of the distal right brachial artery, could be secondary to embolic occlusion, trauma, or other focal etiology.  Ultimate cause for vessel occlusion is not readily identified on the exam.  2. Delayed, but diminished collateral flow reconstitution with contrast runoff to the hand visualized.  ==========    This report was flagged in Epic as abnormal.    RADIATION DOSE  Automated tube current modulation, weight-based exposure dosing, and/or iterative reconstruction technique utilized to reach lowest reasonably achievable exposure rate.    DLP: 1641 mGy*cm      Electronically signed by: Yaron Mckinney  Date:    12/07/2023  Time:    19:40                                     Medications   heparin 25,000 units in dextrose 5% (100 units/ml) IV bolus from bag INITIAL BOLUS (5,088 Units Intravenous Bolus from Bag 12/7/23 1927)   LORazepam tablet 1 mg (1 mg Oral Given 12/7/23 1907)   cefazolin (ANCEF) 2 gram in dextrose 5% 50 mL IVPB (premix) (2 g Intravenous New Bag 12/7/23 2105)   iopamidoL (ISOVUE-370) injection 100 mL (100 mLs Intravenous Given 12/7/23 1921)    iopamidoL (ISOVUE-370) injection 100 mL (100 mLs Intravenous Given 12/8/23 8045)   potassium chloride SA CR tablet 40 mEq (40 mEq Oral Given 12/9/23 1306)     Medical Decision Making  Differential diagnosis includes, but is not limited to    Amount and/or Complexity of Data Reviewed  External Data Reviewed: notes.     Details: Outpatient MRI  Labs: ordered.  Radiology: ordered.    Risk  Prescription drug management.  Decision regarding hospitalization.      Differential diagnosis (includes but is not limited to):   Arterial occlusion, limb ischemia, vascular injury, neurologic injury    MDM Narrative  70-year-old female presents for evaluation of right arm intermittent weakness and numbness, outpatient imaging showed concern for vascular abnormality of the right upper extremity.  Vascular ultrasound ordered and vascular surgery has been consulted.  Heparin drip initiated.  Vascular planning to take the patient for thrombectomy.  Patient NPO.  Patient to be admitted for further evaluation and management.  Patient agrees with plan of care and has no questions at this time.  Labs are pending.  Pain and nausea control as needed.  Telemetry monitoring independently reviewed and shows a sinus rhythm with a heart rate in the 50s.    Dispo: Admit    My independent radiology interpretation: as above  Point of care US (independently performed and interpreted):   Decision rules/clinical scoring:     Sepsis Perfusion Assessment:     Amount and/or Complexity of Data Reviewed  Independent historian: none   Summary of history:   External data reviewed: notes from previous admissions, notes from previous ED visits, and notes from clinic visits  Summary of data reviewed: Prior records reviewed  Risk and benefits of testing: discussed   Labs: ordered and reviewed  Radiology: ordered and independent interpretation performed (see above or ED course)  ECG/medicine tests: ordered and independent interpretation performed (see above or  ED course)  Discussion of management or test interpretation with external provider(s): discussed with hospitalist physician and discussed with vascular surgery consultant   Summary of discussion: as above    Risk  Parenteral controlled substances   Drug therapy requiring intense monitoring for toxicity   Decision regarding hospitalization  Emergency major surgery   Shared decision making     Critical Care  30-74 minutes     Data Reviewed/Counseling: I have personally reviewed the patient's vital signs, nursing notes, and other relevant tests, information, and imaging. I had a detailed discussion regarding the historical points, exam findings, and any diagnostic results supporting the discharge diagnosis. I personally performed the history, PE, MDM and procedures as documented above and agree with the scribe's documentation.    Portions of this note were dictated using voice recognition software. Although it was reviewed for accuracy, some inherent voice recognition errors may have occurred and may be present in this document.             ED Course as of 12/13/23 0542   Thu Dec 07, 2023   [id number] Paged vascular surgery. [BP]   2100 EKG independently interpreted by me.  EKG: NSR @ 61, LVH, biphasic T waves in inferior leads and septal leads.  Discussed with cardiology, agrees with heparin and will see in consult. [MC]      ED Course User Index  [BP] Sapna Ramirez  [MC] Asim Landers MD                           Clinical Impression:  Final diagnoses:  [M79.603] Arm pain  [I73.9] Peripheral arterial disease  [I99.8] Limb ischemia          ED Disposition Condition    Admit Stable                Asim Landers MD  12/13/23 0542

## 2023-12-07 NOTE — FIRST PROVIDER EVALUATION
Medical screening examination initiated.  I have conducted a focused provider triage encounter, findings are as follows:    Brief history of present illness:  Patient states that she was sent to the ED due to an outpatient arterial US of her right upper extremity being abnormal. States right hand numbness x 5 days.     There were no vitals filed for this visit.    Pertinent physical exam:  Awake, alert, ambulatory      Brief workup plan:  Labs    Preliminary workup initiated; this workup will be continued and followed by the physician or advanced practice provider that is assigned to the patient when roomed.

## 2023-12-08 ENCOUNTER — ANESTHESIA (OUTPATIENT)
Dept: INTERVENTIONAL RADIOLOGY/VASCULAR | Facility: HOSPITAL | Age: 78
DRG: 252 | End: 2023-12-08
Payer: MEDICARE

## 2023-12-08 PROBLEM — I63.512 ACUTE CEREBROVASCULAR ACCIDENT (CVA) DUE TO OCCLUSION OF LEFT MIDDLE CEREBRAL ARTERY: Status: ACTIVE | Noted: 2023-12-08

## 2023-12-08 LAB
AV INDEX (PROSTH): 0.6
AV MEAN GRADIENT: 7 MMHG
AV PEAK GRADIENT: 15 MMHG
AV VELOCITY RATIO: 0.51
BASOPHILS # BLD AUTO: 0.04 X10(3)/MCL
BASOPHILS NFR BLD AUTO: 0.3 %
BSA FOR ECHO PROCEDURE: 1.83 M2
CHOLEST SERPL-MCNC: 207 MG/DL
CHOLEST/HDLC SERPL: 5 {RATIO} (ref 0–5)
CK MB SERPL-MCNC: 1.5 NG/ML
CV ECHO LV RWT: 0.34 CM
DOP CALC AO PEAK VEL: 1.91 M/S
DOP CALC AO VTI: 29.1 CM
DOP CALC LVOT PEAK VEL: 0.97 M/S
DOP CALCLVOT PEAK VEL VTI: 17.5 CM
E WAVE DECELERATION TIME: 187 MSEC
E/A RATIO: 1.02
E/E' RATIO: 9 M/S
ECHO LV POSTERIOR WALL: 0.88 CM (ref 0.6–1.1)
EOSINOPHIL # BLD AUTO: 0.05 X10(3)/MCL (ref 0–0.9)
EOSINOPHIL NFR BLD AUTO: 0.4 %
ERYTHROCYTE [DISTWIDTH] IN BLOOD BY AUTOMATED COUNT: 12.4 % (ref 11.5–17)
EST. AVERAGE GLUCOSE BLD GHB EST-MCNC: 116.9 MG/DL
FRACTIONAL SHORTENING: 34 % (ref 28–44)
HBA1C MFR BLD: 5.7 %
HCT VFR BLD AUTO: 39.9 % (ref 37–47)
HDLC SERPL-MCNC: 39 MG/DL (ref 35–60)
HGB BLD-MCNC: 13.3 G/DL (ref 12–16)
IMM GRANULOCYTES # BLD AUTO: 0.06 X10(3)/MCL (ref 0–0.04)
IMM GRANULOCYTES NFR BLD AUTO: 0.5 %
INR PPP: 1.1
INTERVENTRICULAR SEPTUM: 1.01 CM (ref 0.6–1.1)
LDLC SERPL CALC-MCNC: 121 MG/DL (ref 50–140)
LEFT ATRIUM SIZE: 2.8 CM
LEFT ATRIUM VOLUME INDEX MOD: 33.8 ML/M2
LEFT ATRIUM VOLUME MOD: 61.2 CM3
LEFT INTERNAL DIMENSION IN SYSTOLE: 3.46 CM (ref 2.1–4)
LEFT VENTRICLE DIASTOLIC VOLUME INDEX: 72.38 ML/M2
LEFT VENTRICLE DIASTOLIC VOLUME: 131 ML
LEFT VENTRICLE MASS INDEX: 100 G/M2
LEFT VENTRICLE SYSTOLIC VOLUME INDEX: 27.3 ML/M2
LEFT VENTRICLE SYSTOLIC VOLUME: 49.5 ML
LEFT VENTRICULAR INTERNAL DIMENSION IN DIASTOLE: 5.22 CM (ref 3.5–6)
LEFT VENTRICULAR MASS: 181.3 G
LV LATERAL E/E' RATIO: 6.3 M/S
LV SEPTAL E/E' RATIO: 15.75 M/S
LVOT MG: 2 MMHG
LVOT MV: 0.62 CM/S
LYMPHOCYTES # BLD AUTO: 2.29 X10(3)/MCL (ref 0.6–4.6)
LYMPHOCYTES NFR BLD AUTO: 17.8 %
MCH RBC QN AUTO: 30.6 PG (ref 27–31)
MCHC RBC AUTO-ENTMCNC: 33.3 G/DL (ref 33–36)
MCV RBC AUTO: 91.7 FL (ref 80–94)
MONOCYTES # BLD AUTO: 0.85 X10(3)/MCL (ref 0.1–1.3)
MONOCYTES NFR BLD AUTO: 6.6 %
MV PEAK A VEL: 0.62 M/S
MV PEAK E VEL: 0.63 M/S
NEUTROPHILS # BLD AUTO: 9.56 X10(3)/MCL (ref 2.1–9.2)
NEUTROPHILS NFR BLD AUTO: 74.4 %
NRBC BLD AUTO-RTO: 0 %
PLATELET # BLD AUTO: 233 X10(3)/MCL (ref 130–400)
PMV BLD AUTO: 10.5 FL (ref 7.4–10.4)
POCT GLUCOSE: 116 MG/DL (ref 70–110)
PROTHROMBIN TIME: 14.1 SECONDS (ref 12.5–14.5)
RBC # BLD AUTO: 4.35 X10(6)/MCL (ref 4.2–5.4)
RIGHT VENTRICULAR END-DIASTOLIC DIMENSION: 3.31 CM
TDI LATERAL: 0.1 M/S
TDI SEPTAL: 0.04 M/S
TDI: 0.07 M/S
TRICUSPID ANNULAR PLANE SYSTOLIC EXCURSION: 1.61 CM
TRIGL SERPL-MCNC: 234 MG/DL (ref 37–140)
TROPONIN I SERPL-MCNC: 0.06 NG/ML (ref 0–0.04)
TSH SERPL-ACNC: 3.14 UIU/ML (ref 0.35–4.94)
VLDLC SERPL CALC-MCNC: 47 MG/DL
WBC # SPEC AUTO: 12.85 X10(3)/MCL (ref 4.5–11.5)
Z-SCORE OF LEFT VENTRICULAR DIMENSION IN END DIASTOLE: 0.42
Z-SCORE OF LEFT VENTRICULAR DIMENSION IN END SYSTOLE: 0.88

## 2023-12-08 PROCEDURE — D9220A PRA ANESTHESIA: ICD-10-PCS | Mod: ,,, | Performed by: NURSE ANESTHETIST, CERTIFIED REGISTERED

## 2023-12-08 PROCEDURE — 63600175 PHARM REV CODE 636 W HCPCS: Performed by: NURSE ANESTHETIST, CERTIFIED REGISTERED

## 2023-12-08 PROCEDURE — 25000003 PHARM REV CODE 250: Performed by: SURGERY

## 2023-12-08 PROCEDURE — 92610 EVALUATE SWALLOWING FUNCTION: CPT

## 2023-12-08 PROCEDURE — D9220A PRA ANESTHESIA: Mod: ,,, | Performed by: NURSE ANESTHETIST, CERTIFIED REGISTERED

## 2023-12-08 PROCEDURE — 25000003 PHARM REV CODE 250: Performed by: NURSE ANESTHETIST, CERTIFIED REGISTERED

## 2023-12-08 PROCEDURE — 85025 COMPLETE CBC W/AUTO DIFF WBC: CPT | Performed by: STUDENT IN AN ORGANIZED HEALTH CARE EDUCATION/TRAINING PROGRAM

## 2023-12-08 PROCEDURE — 61645 PERQ ART M-THROMBECT &/NFS: CPT | Mod: LT,,, | Performed by: PSYCHIATRY & NEUROLOGY

## 2023-12-08 PROCEDURE — 99232 PR SUBSEQUENT HOSPITAL CARE,LEVL II: ICD-10-PCS | Mod: ,,, | Performed by: INTERNAL MEDICINE

## 2023-12-08 PROCEDURE — 84443 ASSAY THYROID STIM HORMONE: CPT | Performed by: INTERNAL MEDICINE

## 2023-12-08 PROCEDURE — 92523 SPEECH SOUND LANG COMPREHEN: CPT

## 2023-12-08 PROCEDURE — 25500020 PHARM REV CODE 255: Performed by: INTERNAL MEDICINE

## 2023-12-08 PROCEDURE — 76937 US GUIDE VASCULAR ACCESS: CPT | Mod: 26,,, | Performed by: PSYCHIATRY & NEUROLOGY

## 2023-12-08 PROCEDURE — 99232 SBSQ HOSP IP/OBS MODERATE 35: CPT | Mod: ,,, | Performed by: INTERNAL MEDICINE

## 2023-12-08 PROCEDURE — 61645 PR PERQ ARTERIAL TRANS MECH THROMBECTOMY AND/OR INFUS INTRACRANIAL ANY METHOD, INCL DX ANG, INCL GUIDE, CATH PLAC, INTRA PHARM THROMB INJ: ICD-10-PCS | Mod: LT,,, | Performed by: PSYCHIATRY & NEUROLOGY

## 2023-12-08 PROCEDURE — 83036 HEMOGLOBIN GLYCOSYLATED A1C: CPT | Performed by: NURSE PRACTITIONER

## 2023-12-08 PROCEDURE — 25000003 PHARM REV CODE 250: Performed by: PSYCHIATRY & NEUROLOGY

## 2023-12-08 PROCEDURE — 84484 ASSAY OF TROPONIN QUANT: CPT

## 2023-12-08 PROCEDURE — 82553 CREATINE MB FRACTION: CPT | Performed by: INTERNAL MEDICINE

## 2023-12-08 PROCEDURE — 85610 PROTHROMBIN TIME: CPT | Performed by: INTERNAL MEDICINE

## 2023-12-08 PROCEDURE — 20000000 HC ICU ROOM

## 2023-12-08 PROCEDURE — 99223 1ST HOSP IP/OBS HIGH 75: CPT | Mod: ,,, | Performed by: PSYCHIATRY & NEUROLOGY

## 2023-12-08 PROCEDURE — 99223 PR INITIAL HOSPITAL CARE,LEVL III: ICD-10-PCS | Mod: ,,, | Performed by: PSYCHIATRY & NEUROLOGY

## 2023-12-08 PROCEDURE — 76937 PR  US GUIDE, VASCULAR ACCESS: ICD-10-PCS | Mod: 26,,, | Performed by: PSYCHIATRY & NEUROLOGY

## 2023-12-08 PROCEDURE — 80061 LIPID PANEL: CPT | Performed by: INTERNAL MEDICINE

## 2023-12-08 RX ORDER — CHOLECALCIFEROL (VITAMIN D3) 25 MCG
1000 TABLET ORAL DAILY
COMMUNITY

## 2023-12-08 RX ORDER — ATORVASTATIN CALCIUM 20 MG/1
20 TABLET, FILM COATED ORAL DAILY
COMMUNITY
End: 2024-01-30

## 2023-12-08 RX ORDER — ONDANSETRON 2 MG/ML
INJECTION INTRAMUSCULAR; INTRAVENOUS
Status: DISCONTINUED | OUTPATIENT
Start: 2023-12-08 | End: 2023-12-08

## 2023-12-08 RX ORDER — FENTANYL CITRATE 50 UG/ML
INJECTION, SOLUTION INTRAMUSCULAR; INTRAVENOUS
Status: DISCONTINUED | OUTPATIENT
Start: 2023-12-08 | End: 2023-12-08

## 2023-12-08 RX ORDER — PROPOFOL 10 MG/ML
VIAL (ML) INTRAVENOUS
Status: DISCONTINUED | OUTPATIENT
Start: 2023-12-08 | End: 2023-12-08

## 2023-12-08 RX ORDER — SODIUM CHLORIDE 0.9 % (FLUSH) 0.9 %
10 SYRINGE (ML) INJECTION
Status: DISCONTINUED | OUTPATIENT
Start: 2023-12-08 | End: 2023-12-11 | Stop reason: HOSPADM

## 2023-12-08 RX ORDER — LIDOCAINE HYDROCHLORIDE 20 MG/ML
INJECTION, SOLUTION EPIDURAL; INFILTRATION; INTRACAUDAL; PERINEURAL
Status: DISCONTINUED | OUTPATIENT
Start: 2023-12-08 | End: 2023-12-08

## 2023-12-08 RX ORDER — LABETALOL HYDROCHLORIDE 5 MG/ML
10 INJECTION, SOLUTION INTRAVENOUS EVERY 4 HOURS
Status: DISCONTINUED | OUTPATIENT
Start: 2023-12-08 | End: 2023-12-08

## 2023-12-08 RX ORDER — HEPARIN SODIUM 1000 [USP'U]/ML
INJECTION, SOLUTION INTRAVENOUS; SUBCUTANEOUS
Status: DISCONTINUED | OUTPATIENT
Start: 2023-12-08 | End: 2023-12-08

## 2023-12-08 RX ORDER — MUPIROCIN 20 MG/G
OINTMENT TOPICAL 2 TIMES DAILY
Status: DISCONTINUED | OUTPATIENT
Start: 2023-12-08 | End: 2023-12-11 | Stop reason: HOSPADM

## 2023-12-08 RX ORDER — PHENYLEPHRINE HYDROCHLORIDE 10 MG/ML
INJECTION INTRAVENOUS
Status: DISCONTINUED | OUTPATIENT
Start: 2023-12-08 | End: 2023-12-08

## 2023-12-08 RX ORDER — SODIUM CHLORIDE 9 MG/ML
INJECTION, SOLUTION INTRAVENOUS CONTINUOUS
Status: DISCONTINUED | OUTPATIENT
Start: 2023-12-08 | End: 2023-12-11 | Stop reason: HOSPADM

## 2023-12-08 RX ORDER — LABETALOL HYDROCHLORIDE 5 MG/ML
10 INJECTION, SOLUTION INTRAVENOUS
Status: DISCONTINUED | OUTPATIENT
Start: 2023-12-08 | End: 2023-12-11 | Stop reason: HOSPADM

## 2023-12-08 RX ORDER — HEPARIN SODIUM 5000 [USP'U]/ML
5000 INJECTION, SOLUTION INTRAVENOUS; SUBCUTANEOUS EVERY 8 HOURS
Status: DISCONTINUED | OUTPATIENT
Start: 2023-12-09 | End: 2023-12-11 | Stop reason: HOSPADM

## 2023-12-08 RX ORDER — HYDRALAZINE HYDROCHLORIDE 20 MG/ML
10 INJECTION INTRAMUSCULAR; INTRAVENOUS
Status: DISCONTINUED | OUTPATIENT
Start: 2023-12-08 | End: 2023-12-11 | Stop reason: HOSPADM

## 2023-12-08 RX ORDER — ROCURONIUM BROMIDE 10 MG/ML
INJECTION, SOLUTION INTRAVENOUS
Status: DISCONTINUED | OUTPATIENT
Start: 2023-12-08 | End: 2023-12-08

## 2023-12-08 RX ADMIN — PHENYLEPHRINE HYDROCHLORIDE 200 MCG: 10 INJECTION INTRAVENOUS at 04:12

## 2023-12-08 RX ADMIN — LIDOCAINE HYDROCHLORIDE 4 ML: 20 INJECTION, SOLUTION INTRAVENOUS at 04:12

## 2023-12-08 RX ADMIN — IOPAMIDOL 100 ML: 755 INJECTION, SOLUTION INTRAVENOUS at 02:12

## 2023-12-08 RX ADMIN — SODIUM CHLORIDE: 9 INJECTION, SOLUTION INTRAVENOUS at 04:12

## 2023-12-08 RX ADMIN — ROCURONIUM BROMIDE 50 MG: 10 SOLUTION INTRAVENOUS at 04:12

## 2023-12-08 RX ADMIN — CARVEDILOL 6.25 MG: 3.12 TABLET, FILM COATED ORAL at 04:12

## 2023-12-08 RX ADMIN — HEPARIN SODIUM 2000 UNITS: 1000 INJECTION, SOLUTION INTRAVENOUS; SUBCUTANEOUS at 04:12

## 2023-12-08 RX ADMIN — SUGAMMADEX 147 MG: 100 INJECTION, SOLUTION INTRAVENOUS at 04:12

## 2023-12-08 RX ADMIN — ONDANSETRON 4 MG: 2 INJECTION INTRAMUSCULAR; INTRAVENOUS at 04:12

## 2023-12-08 RX ADMIN — PROPOFOL 130 MG: 10 INJECTION, EMULSION INTRAVENOUS at 04:12

## 2023-12-08 RX ADMIN — SODIUM CHLORIDE, SODIUM GLUCONATE, SODIUM ACETATE, POTASSIUM CHLORIDE AND MAGNESIUM CHLORIDE: 526; 502; 368; 37; 30 INJECTION, SOLUTION INTRAVENOUS at 03:12

## 2023-12-08 RX ADMIN — SODIUM CHLORIDE: 9 INJECTION, SOLUTION INTRAVENOUS at 12:12

## 2023-12-08 RX ADMIN — FAMOTIDINE 20 MG: 20 TABLET ORAL at 02:12

## 2023-12-08 RX ADMIN — FENTANYL CITRATE 100 MCG: 50 INJECTION, SOLUTION INTRAMUSCULAR; INTRAVENOUS at 04:12

## 2023-12-08 RX ADMIN — SODIUM CHLORIDE: 9 INJECTION, SOLUTION INTRAVENOUS at 05:12

## 2023-12-08 NOTE — SUBJECTIVE & OBJECTIVE
Subjective:     Interval History:   Nursing reports no new issues, has continued to improve since being in the ICU.     Current Neurological Medications:     Current Facility-Administered Medications   Medication Dose Route Frequency Provider Last Rate Last Admin    0.9%  NaCl infusion   Intravenous Continuous Fox Mcqueen  mL/hr at 12/08/23 0614 Rate Verify at 12/08/23 0614    aspirin chewable tablet 81 mg  81 mg Oral Daily Jh Beckford MD        carvediloL tablet 6.25 mg  6.25 mg Oral BID WM Jh Beckford MD        famotidine tablet 20 mg  20 mg Oral Daily Jh Beckford MD        [START ON 12/9/2023] heparin (porcine) injection 5,000 Units  5,000 Units Subcutaneous Q8H Mike Jaramillo DO        hydrALAZINE injection 10 mg  10 mg Intravenous Q2H PRN Mike Jaramillo DO        labetaloL injection 10 mg  10 mg Intravenous Q2H PRN Mike Jaramillo DO        levothyroxine tablet 25 mcg  25 mcg Oral Before breakfast Jh Beckford MD        melatonin tablet 6 mg  6 mg Oral Nightly PRN Jh Beckford MD        morphine injection 2 mg  2 mg Intravenous Q4H PRN Jh Beckford MD        morphine injection 4 mg  4 mg Intravenous Q4H PRN Jh Beckford MD        ondansetron injection 4 mg  4 mg Intravenous Q8H PRN Jh Beckford MD        sodium chloride 0.9% flush 10 mL  10 mL Intravenous PRN Jh Beckford MD        sodium chloride 0.9% flush 10 mL  10 mL Intravenous PRN Raheem Do MD        sodium chloride 0.9% flush 10 mL  10 mL Intravenous PRN Fox Mcqueen MD        sodium chloride 0.9% flush 10 mL  10 mL Intravenous PRN Mike Jaramillo DO         Facility-Administered Medications Ordered in Other Encounters   Medication Dose Route Frequency Provider Last Rate Last Admin    electrolyte-A infusion   Intravenous Continuous PRN Arnav Hand CRNA   New Bag at 12/07/23 2153    ePHEDrine sulfate   Intravenous PRN Arnva Hand CRNA   10 mg at 12/07/23 2151    fentaNYL injection   Intravenous PRN Yazan  Arnav CRNA   50 mcg at 12/07/23 2116    glycopyrrolate injection   Intravenous PRN Arnav Hand, CRNA   0.2 mg at 12/07/23 2105    heparin (porcine) injection   Intravenous PRN Arnav Hand CRNA   5,000 Units at 12/07/23 2128    LIDOcaine (PF) 20 mg/mL (2%) injection   Intravenous PRN Arnav Hand, CRNA   40 mg at 12/07/23 2057    ondansetron HCl (PF) injection   Intravenous PRN Arnav Hand, CRNA   4 mg at 12/07/23 2105    phenylephrine HCl in 0.9% NaCl 1 mg/10 mL (100 mcg/mL) syringe   Intravenous PRN Arnav Hand, CRNA   200 mcg at 12/07/23 2108    propofol (DIPRIVAN) 10 mg/mL infusion   Intravenous PRN Arnav Hand, CRNA   120 mg at 12/07/23 2057    protamine injection   Intravenous PRN Arnav Hand, CRNA   30 mg at 12/07/23 2217    rocuronium injection   Intravenous PRN Arnav Hand, CRNA   50 mg at 12/07/23 2057    sugammadex (BRIDION) 100 mg/mL injection   Intravenous PRN Arnav Hand, CRNA   200 mg at 12/07/23 2234       Review of Systems   Unable to perform ROS: Acuity of condition (limited information 2/2 acuity of condition/aphasia)     Objective:     Vital Signs (Most Recent):  Temp: 98.1 °F (36.7 °C) (12/08/23 0531)  Pulse: 68 (12/08/23 0700)  Resp: (!) 21 (12/08/23 0700)  BP: 123/71 (12/08/23 0700)  SpO2: (!) 92 % (12/08/23 0700) Vital Signs (24h Range):  Temp:  [97.5 °F (36.4 °C)-98.1 °F (36.7 °C)] 98.1 °F (36.7 °C)  Pulse:  [59-97] 68  Resp:  [12-24] 21  SpO2:  [92 %-100 %] 92 %  BP: (117-176)/() 123/71     Weight: 73.5 kg (162 lb)  Body mass index is 26.96 kg/m².     Physical Exam  Vitals and nursing note reviewed.   Constitutional:       General: She is not in acute distress.     Appearance: She is not ill-appearing or toxic-appearing.   HENT:      Head: Normocephalic.   Eyes:      General: No visual field deficit.     Pupils: Pupils are equal, round, and reactive to light.   Pulmonary:      Effort: Pulmonary effort is normal.   Musculoskeletal:         General: Normal range of  motion.      Cervical back: Normal range of motion.      Right lower leg: No edema.      Left lower leg: No edema.   Skin:     General: Skin is warm and dry.      Capillary Refill: Capillary refill takes less than 2 seconds.   Neurological:      Mental Status: She is alert.      Cranial Nerves: No dysarthria (word finding difficulty/aphasia, unable to name thumb or palm, after several tries she was able to identify pen) or facial asymmetry.      Sensory: No sensory deficit.      Motor: No weakness, tremor, atrophy, abnormal muscle tone, seizure activity or pronator drift.      Coordination: Coordination normal. Finger-Nose-Finger Test normal.          NEUROLOGICAL EXAMINATION:     CRANIAL NERVES     CN III, IV, VI   Pupils are equal, round, and reactive to light.    GAIT AND COORDINATION      Coordination   Finger to nose coordination: normal      Significant Labs:   Recent Lab Results         12/08/23  0627   12/08/23  0158   12/07/23  1920   12/07/23  1811        Albumin/Globulin Ratio       0.9       Albumin       3.7       ALP       93       ALT       15       AST       18       Baso # 0.04       0.04       Basophil % 0.3       0.4       BILIRUBIN TOTAL       0.4       BUN       21.1       Calcium       9.5       Chloride       104       Cholesterol Total 207             CO2       24       CPK MB 1.5             Creatinine       0.95       eGFR       >60       Eos # 0.05       0.26       Eosinophil % 0.4       2.6       Globulin, Total       4.1       Glucose       126       HDL 39             Hematocrit 39.9       43.8       Hemoglobin 13.3       15.2       Immature Grans (Abs) 0.06       0.02       Immature Granulocytes 0.5       0.2       INR 1.1       0.9       LDL Cholesterol 121.00             Lymph # 2.29       2.56       LYMPH % 17.8       25.8       MCH 30.6       30.6       MCHC 33.3       34.7       MCV 91.7       88.3       Mono # 0.85       0.79       Mono % 6.6       8.0       MPV 10.5       10.5        Neut # 9.56       6.25       Neut % 74.4       63.0       nRBC 0.0       0.0       Platelet Count 233       265       POCT Glucose   116           Potassium       3.5       PROTEIN TOTAL       7.8       Protime 14.1       12.2       RBC 4.35       4.96       RDW 12.4       12.1       Sodium       139       Total Cholesterol/HDL Ratio 5             Triglycerides 234             Troponin I 0.064     0.078   0.067  Comment: QRY       TSH 3.139             Very Low Density Lipoprotein 47             WBC 12.85       9.92               Significant Imaging: I have reviewed all pertinent imaging results/findings within the past 24 hours.

## 2023-12-08 NOTE — PT/OT/SLP EVAL
[facility]  Speech Language Pathology Department  Cognitive-Communication Evaluation    Patient Name:  Manisha Kirk   MRN:  [id number]    Recommendations:     General recommendations:  speech/language therapy  Communication strategies:  yes/no questions only, provide increased time to answer, and go to room if call light pushed    Discharge therapy intensity: High Intensity Therapy (pending PT/OT assessment)  Barriers to safe discharge: acuity of illness    History:     Manisha Kirk is a/n 78 y.o. female admitted following a right brachial embolectomy with Dr Beckford on 12/7 was noted to have aphasia and right sided weakness at around 2 AM when code fast was called. She underwent CTH, CTA which showed no hemorrhage and a left MCA occlusion.  Cerebral angiogram with Intra-arterial tPA was performed and right femoral artery access closed with angioseal.     Past Medical History:   Diagnosis Date    Autoimmune thyroiditis     HLD (hyperlipidemia)     Hypertension      Past Surgical History:   Procedure Laterality Date    CATARACT EXTRACTION      CHOLECYSTECTOMY      SURGICAL REMOVAL OF BONE SPUR      TONSILLECTOMY      TOTAL ABDOMINAL HYSTERECTOMY         Previous level of Function  Education:college  Occupation: retired  Lives: alone  Handed: Right  Glasses:  reading  Hearing Aids: no  Home Responsibilities: drives, financial management, medication/health management, laundry, shopping, meal preparation, and cleaning    Subjective     Patient awake, alert, and cooperative.    Patient goals: to go home     Spiritual/Cultural/Congregational Beliefs/Practices that affect care: no  Pain/Comfort: Pain Rating 1: 0/10  Respiratory Status: room air    Objective:     ORAL MUSCULATURE  Dentition: own teeth  Facial Movement: WFL  Buccal Strength & Mobility: WFL  Mandibular Strength & Mobility: WFL  Oral Labial Strength & Mobility: WFL  Lingual Strength & Mobility: WFL  Velar Elevation: unable to  assess    SPEECH PRODUCTION  Phoneme Production: adequate  Voice Quality: adequate  Voice Production: adequate  Speech Rate: appropriate  Loudness: acceptable  Respiration: WFL for speech  Resonance: adequate  Prosody: adequate  Speech Intelligibility  Known Context: Greater that 90%  Unknown Context: Greater that 90%    AUDITORY COMPREHENSION  Following Directions:  1-Step: 80%  2-Step: 75%  Yes/No Questions:  Biographical: 100%  Environmental: 100%  Simple: 80%    VERBAL EXPRESSION  Automatic Speech:  Days of the week: Within Functional Limits  Months of the year: Within Functional Limits  Counting: Within Functional Limits  Alphabet: Within Functional Limits  Repetition:  Phrases: 90%  Sentences: 0%  Phrase Completion:  100%  Confrontation Naming  Objects: 40%  Wh- Questions:  Object name: 100%  Object function: 60%  Phonemic and semantic paraphasias noted  Conversational speech circumlocutionary    COGNITION  Orientation:  Person: yes  Place: yes  Time: yes  Situation: yes   Unable to fully assess due to language impairments    Assessment:     Pt presents with mild-moderate receptive and expressive language impairements negatively impacting safe, independent living.    Goals:     Multidisciplinary Problems       SLP Goals          Problem: SLP    Goal Priority Disciplines Outcome   SLP Goal     SLP Ongoing, Progressing   Description:   LTG: Communicate complex wants/needs/ideas with less than 10% communication breakdown  STGs:  1.  2-step directions 90% accuracy  2.  Simple yes/no questions 90%  3.  Repetition of 5-6 word sentences with 75% accuracy  4.  Name objects 75% accuracy  5.  Wh- questions 90% accuracy                     Patient Education:     Patient and family were provided with verbal education regarding SLP POC.  Understanding was verbalized.    Plan:     SLP Follow-Up:  Yes   Patient to be seen:  5 x/week   Plan of Care expires:  12/22/23  Plan of Care reviewed with:  patient, family      Time  Tracking:     SLP Treatment Date:   12/08/23  Speech Start Time:  1450  Speech Stop Time:  1505     Speech Total Time (min):  15 min    Billable minutes:  Evaluation of Speech Sound Production with Comprehension and Expression, 15 minutes     12/08/2023

## 2023-12-08 NOTE — PLAN OF CARE
Problem: Adult Inpatient Plan of Care  Goal: Plan of Care Review  Outcome: Ongoing, Progressing  Goal: Patient-Specific Goal (Individualized)  Outcome: Ongoing, Progressing  Goal: Absence of Hospital-Acquired Illness or Injury  Outcome: Ongoing, Progressing  Goal: Optimal Comfort and Wellbeing  Outcome: Ongoing, Progressing  Goal: Readiness for Transition of Care  Outcome: Ongoing, Progressing     Problem: Infection  Goal: Absence of Infection Signs and Symptoms  Outcome: Ongoing, Progressing     Problem: Adjustment to Illness (Stroke, Ischemic/Transient Ischemic Attack)  Goal: Optimal Coping  Outcome: Ongoing, Progressing     Problem: Bowel Elimination Impaired (Stroke, Ischemic/Transient Ischemic Attack)  Goal: Effective Bowel Elimination  Outcome: Ongoing, Progressing     Problem: Cerebral Tissue Perfusion (Stroke, Ischemic/Transient Ischemic Attack)  Goal: Optimal Cerebral Tissue Perfusion  Outcome: Ongoing, Progressing     Problem: Cognitive Impairment (Stroke, Ischemic/Transient Ischemic Attack)  Goal: Optimal Cognitive Function  Outcome: Ongoing, Progressing     Problem: Communication Impairment (Stroke, Ischemic/Transient Ischemic Attack)  Goal: Improved Communication Skills  Outcome: Ongoing, Progressing     Problem: Functional Ability Impaired (Stroke, Ischemic/Transient Ischemic Attack)  Goal: Optimal Functional Ability  Outcome: Ongoing, Progressing     Problem: Respiratory Compromise (Stroke, Ischemic/Transient Ischemic Attack)  Goal: Effective Oxygenation and Ventilation  Outcome: Ongoing, Progressing     Problem: Sensorimotor Impairment (Stroke, Ischemic/Transient Ischemic Attack)  Goal: Improved Sensorimotor Function  Outcome: Ongoing, Progressing     Problem: Swallowing Impairment (Stroke, Ischemic/Transient Ischemic Attack)  Goal: Optimal Eating and Swallowing without Aspiration  Outcome: Ongoing, Progressing     Problem: Urinary Elimination Impaired (Stroke, Ischemic/Transient Ischemic  Attack)  Goal: Effective Urinary Elimination  Outcome: Ongoing, Progressing     Problem: Fall Injury Risk  Goal: Absence of Fall and Fall-Related Injury  Outcome: Ongoing, Progressing

## 2023-12-08 NOTE — TRANSFER OF CARE
"Anesthesia Transfer of Care Note    Patient: Manisha Kirk    Procedure(s) Performed: Procedure(s) (LRB):  EMBOLECTOMY OR THROMBECTOMY, BLOOD VESSEL, UPPER EXTREMITY (Right)    Patient location: PACU    Anesthesia Type: general    Transport from OR: Transported from OR on room air with adequate spontaneous ventilation    Post pain: adequate analgesia    Post assessment: no apparent anesthetic complications and tolerated procedure well    Post vital signs: stable    Level of consciousness: sedated    Nausea/Vomiting: no nausea/vomiting    Complications: none    Transfer of care protocol was followed      Last vitals: Visit Vitals  BP (!) 168/88 (BP Location: Left arm, Patient Position: Lying)   Pulse 77   Temp 36.7 °C (98 °F) (Oral)   Resp 18   Ht 5' 5" (1.651 m)   Wt 73.5 kg (162 lb)   SpO2 95%   BMI 26.96 kg/m²     "

## 2023-12-08 NOTE — NURSING
Spoke with Dr Barr about putting in Ischemic Stroke order set. Orders were placed per phone order.

## 2023-12-08 NOTE — NURSING
Spoke with Dr Rowe about code fast called on patient. Dr Roew gave telephone orders for a CTA head and neck. He also advised to call Dr Beckford to have him look at the imaging.

## 2023-12-08 NOTE — ANESTHESIA PREPROCEDURE EVALUATION
12/08/2023  Manisha Kirk is a 78 y.o., female.      Pre-op Assessment    I have reviewed the Patient Summary Reports.     I have reviewed the Nursing Notes. I have reviewed the NPO Status.   I have reviewed the Medications.     Review of Systems  Anesthesia Hx:  No problems with previous Anesthesia                Hematology/Oncology:  Hematology Normal   Oncology Normal                                   EENT/Dental:  EENT/Dental Normal           Cardiovascular:     Hypertension          PVD                              Pulmonary:  Pulmonary Normal                       Renal/:  Chronic Renal Disease, CKD                Hepatic/GI:  Hepatic/GI Normal                 Musculoskeletal:  Arthritis               Neurological:      Headaches                                 Endocrine:   Hypothyroidism        Obesity / BMI > 30  Dermatological:  Skin Normal    Psych:  Psychiatric Normal                    Physical Exam  General: Well nourished, Cooperative, Alert and Oriented    Airway:  Mallampati: II   Mouth Opening: Normal  TM Distance: Normal  Tongue: Normal  Neck ROM: Normal ROM    Dental:  Intact    Chest/Lungs:  Clear to auscultation    Heart:  Rate: Normal      Anesthesia Plan  Type of Anesthesia, risks & benefits discussed:    Anesthesia Type: Gen ETT  Intra-op Monitoring Plan: Standard ASA Monitors  Post Op Pain Control Plan: multimodal analgesia  Induction:  IV  Airway Plan: Direct  Informed Consent: Informed consent signed with the Patient and all parties understand the risks and agree with anesthesia plan.  All questions answered. Patient consented to blood products? Yes  ASA Score: 3  Day of Surgery Review of History & Physical: H&P Update referred to the surgeon/provider.I have interviewed and examined the patient. I have reviewed the patient's H&P dated:   Anesthesia Plan Notes: Thrombectomy  Cerebral    Ready For Surgery From Anesthesia Perspective.     .

## 2023-12-08 NOTE — BRIEF OP NOTE
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

## 2023-12-08 NOTE — PLAN OF CARE
Problem: SLP  Goal: SLP Goal  Description:   LTG: Communicate complex wants/needs/ideas with less than 10% communication breakdown  STGs:  1.  2-step directions 90% accuracy  2.  Simple yes/no questions 90%  3.  Repetition of 5-6 word sentences with 75% accuracy  4.  Name objects 75% accuracy  5.  Wh- questions 90% accuracy  Outcome: Ongoing, Progressing

## 2023-12-08 NOTE — ANESTHESIA POSTPROCEDURE EVALUATION
Anesthesia Post Evaluation    Patient: Manisha Kirk    Procedure(s) Performed: Procedure(s) (LRB):  EMBOLECTOMY OR THROMBECTOMY, BLOOD VESSEL, UPPER EXTREMITY (Right)    Final Anesthesia Type: general      Patient location during evaluation: PACU  Patient participation: No - Unable to Participate, Sedation  Level of consciousness: sedated  Post-procedure vital signs: reviewed and stable  Pain management: adequate  Airway patency: patent  NARINDER mitigation strategies: Multimodal analgesia  PONV status at discharge: No PONV  Anesthetic complications: no      Cardiovascular status: blood pressure returned to baseline and hemodynamically stable  Respiratory status: oral airway and spontaneous ventilation  Hydration status: euvolemic  Follow-up not needed.            Vitals Value Taken Time   /77 12/07/23 2245   Temp  12/07/23 2248   Pulse 97 12/07/23 2247   Resp 15 12/07/23 2247   SpO2 98 % 12/07/23 2247   Vitals shown include unvalidated device data.      No case tracking events are documented in the log.      Pain/Justice Score: No data recorded

## 2023-12-08 NOTE — PT/OT/SLP PROGRESS
OT orders received and pt chart reviewed. Pt not seen today for initial evaluation due to bedrest until 12/9. Will follow up as appropriate.

## 2023-12-08 NOTE — ASSESSMENT & PLAN NOTE
-presented for right hand numbness and pallor on 12/7, underwent a right brachial embolectomy with Dr. Beckford  -developed aphasia and right sided weakness on 12/8 at 2:00 am, found to have a left MCA occlusion  -intervention: diagnostic angio with spontaneous recanalization of left MCA with distal emboli s/p  intra-arterial tPA   -etiology: TBD      Stroke workup  CT head: negative  CTA and and neck: L MCA occlusion        Plan  -Continue stroke workup  -therapy evals tomorrow (PT/OT/ST), symptom onset 2:00 am on 12/8  -HOB flat unless otherwise directed by Dr. Mcqueen  -Q1 hour neuro checks  -MRI brain today, will need repeat CT head (within 24 hours, 2:00 am on 12/9)  --180  -Hold antiplatelets/anticoagulation until repeat imaging confirms no hemorrhage

## 2023-12-08 NOTE — ASSESSMENT & PLAN NOTE
Chronic, controlled. Latest blood pressure and vitals reviewed-     Temp:  [97.5 °F (36.4 °C)-98.1 °F (36.7 °C)]   Pulse:  [59-97]   Resp:  [12-26]   BP: (103-176)/()   SpO2:  [91 %-100 %] .   Home meds for hypertension were reviewed and noted below.   Hypertension Medications               amLODIPine (NORVASC) 5 MG tablet Take 5 mg by mouth once daily.    carvediloL (COREG) 6.25 MG tablet Take 6.25 mg by mouth 2 (two) times daily with meals.            While in the hospital, will manage blood pressure as follows; Continue home antihypertensive regimen    Will utilize p.r.n. blood pressure medication only if patient's blood pressure greater than 180/110 and she develops symptoms such as worsening chest pain or shortness of breath.

## 2023-12-08 NOTE — ANESTHESIA POSTPROCEDURE EVALUATION
Anesthesia Post Evaluation    Patient: Manisha Kirk    Procedure(s) Performed: * No procedures listed *    Final Anesthesia Type: general      Patient location during evaluation: ICU  Patient participation: Yes- Able to Participate  Level of consciousness: awake  Post-procedure vital signs: reviewed and stable  Pain management: adequate  Airway patency: patent  NARINDER mitigation strategies: Multimodal analgesia  PONV status at discharge: No PONV  Anesthetic complications: no      Cardiovascular status: blood pressure returned to baseline and hemodynamically stable  Respiratory status: unassisted  Hydration status: euvolemic  Follow-up not needed.          Vitals Value Taken Time   /69 12/08/23 0546   Temp 36.7 °C (98.1 °F) 12/08/23 0531   Pulse 72 12/08/23 0551   Resp 19 12/08/23 0551   SpO2 96 % 12/08/23 0551   Vitals shown include unvalidated device data.      No case tracking events are documented in the log.      Pain/Justice Score: Justice Score: 10 (12/8/2023 12:00 AM)

## 2023-12-08 NOTE — H&P
[facility]  Pulmonary Critical Care Note    Patient Name: Manisha Kirk  MRN: [id number]  Admission Date: 12/7/2023  Hospital Length of Stay: 1 days  Code Status: Full Code  Attending Provider: Asim Troy MD  Primary Care Provider: Piter Nolan II, MD     Subjective:     HPI:   Ms Dyer, 78-year-old female, past medical history of hypothyroidism, hyperlipidemia, hypertension, originally presented to the hospital for right brachial artery occlusion on 12/07.  She underwent mechanical thrombectomy via vascular surgery, and was found to have fresh chronic clot in brachial, radial, and ulnar artery.  She eventually developed aphasia and right-sided weakness around 2:00 a.m., and code fast was called.  CT head and CTA were performed, revealing no hemorrhage, and left MCA occlusion.  Patient underwent mechanical thrombectomy of left MCA occlusion with intra-arterial tPA administration with neuro interventional.     Hospital Course/Significant events:  12/7 - mechanical thrombectomy of right brachial/radial/ulnar artery  12/8 - mechanical thrombectomy with intra-arterial tPA of left MCA    24 Hour Interval History:  N/A    Past Medical History:   Diagnosis Date    Autoimmune thyroiditis     HLD (hyperlipidemia)     Hypertension        Past Surgical History:   Procedure Laterality Date    CATARACT EXTRACTION      CHOLECYSTECTOMY      SURGICAL REMOVAL OF BONE SPUR      TONSILLECTOMY      TOTAL ABDOMINAL HYSTERECTOMY         Social History     Socioeconomic History    Marital status:    Tobacco Use    Smoking status: Never    Smokeless tobacco: Never   Substance and Sexual Activity    Alcohol use: Never    Drug use: Never     Social Determinants of Health     Financial Resource Strain: Low Risk  (8/30/2022)    Overall Financial Resource Strain (CARDIA)     Difficulty of Paying Living Expenses: Not hard at all   Food Insecurity: No Food Insecurity (8/30/2022)    Hunger Vital  Sign     Worried About Running Out of Food in the Last Year: Never true     Ran Out of Food in the Last Year: Never true   Transportation Needs: No Transportation Needs (8/30/2022)    PRAPARE - Transportation     Lack of Transportation (Medical): No     Lack of Transportation (Non-Medical): No   Stress: No Stress Concern Present (8/30/2022)    [employer] - Occupational Stress Questionnaire     Feeling of Stress : Not at all   Housing Stability: Low Risk  (8/30/2022)    Housing Stability Vital Sign     Unable to Pay for Housing in the Last Year: No     Number of Places Lived in the Last Year: 1     Unstable Housing in the Last Year: No           Current Outpatient Medications   Medication Instructions    amLODIPine (NORVASC) 5 mg, Oral, Daily    atorvastatin (LIPITOR) 20 mg, Oral, Daily, 1 tablet by mouth daily    carvediloL (COREG) 6.25 mg, Oral, 2 times daily with meals    estrogens, conjugated, (PREMARIN) 0.45 MG tablet TAKE 1 TABLET BY MOUTH DAILY    levothyroxine (SYNTHROID) 25 mcg, Oral    ondansetron (ZOFRAN-ODT) 8 mg, Oral, Every 8 hours PRN    pantoprazole (PROTONIX) 20 MG tablet TAKE 1 TABLET(20 MG) BY MOUTH EVERY DAY    prochlorperazine (COMPAZINE) 10 mg, Oral, Every 6 hours PRN    vitamin D (VITAMIN D3) 1,000 Units, Oral, Daily, 1 tablet by mouth once daily       Current Inpatient Medications   aspirin  81 mg Oral Daily    carvediloL  6.25 mg Oral BID WM    famotidine  20 mg Oral Daily    heparin (porcine)  5,000 Units Subcutaneous Q8H    levothyroxine  25 mcg Oral Before breakfast       Current Intravenous Infusions   sodium chloride 0.9% 100 mL/hr at 12/08/23 0614         Review of Systems   Constitutional:  Negative for chills and fever.   Respiratory:  Negative for cough and shortness of breath.    Cardiovascular:  Negative for chest pain and palpitations.   Gastrointestinal:  Negative for abdominal pain, constipation, diarrhea, nausea and vomiting.   Musculoskeletal:   Negative for myalgias and neck pain.   Neurological:  Positive for speech change. Negative for dizziness, focal weakness, loss of consciousness, weakness and headaches.          Objective:       Intake/Output Summary (Last 24 hours) at 12/8/2023 0726  Last data filed at 12/8/2023 0614  Gross per 24 hour   Intake 2084.04 ml   Output 500 ml   Net 1584.04 ml         Vital Signs (Most Recent):  Temp: 98.1 °F (36.7 °C) (12/08/23 0531)  Pulse: 68 (12/08/23 0700)  Resp: (!) 21 (12/08/23 0700)  BP: 123/71 (12/08/23 0700)  SpO2: (!) 92 % (12/08/23 0700)  Body mass index is 26.96 kg/m².  Weight: 73.5 kg (162 lb) Vital Signs (24h Range):  Temp:  [97.5 °F (36.4 °C)-98.1 °F (36.7 °C)] 98.1 °F (36.7 °C)  Pulse:  [59-97] 68  Resp:  [12-24] 21  SpO2:  [92 %-100 %] 92 %  BP: (117-176)/() 123/71     Physical Exam  Vitals reviewed.   Constitutional:       General: She is not in acute distress.     Appearance: Normal appearance. She is not ill-appearing or toxic-appearing.   HENT:      Mouth/Throat:      Mouth: Mucous membranes are moist.   Eyes:      Extraocular Movements: Extraocular movements intact.      Pupils: Pupils are equal, round, and reactive to light.   Cardiovascular:      Rate and Rhythm: Normal rate and regular rhythm.      Pulses: Normal pulses.      Heart sounds: Normal heart sounds. No murmur heard.     No friction rub. No gallop.   Pulmonary:      Effort: Pulmonary effort is normal. No respiratory distress.      Breath sounds: Normal breath sounds. No stridor. No wheezing.   Abdominal:      General: Abdomen is flat. Bowel sounds are normal. There is no distension.      Palpations: Abdomen is soft. There is no mass.      Tenderness: There is no abdominal tenderness.   Musculoskeletal:         General: Normal range of motion.      Right lower leg: No edema.      Left lower leg: No edema.   Skin:     General: Skin is warm and dry.      Coloration: Skin is not jaundiced or pale.      Findings: No bruising.  "  Neurological:      Mental Status: She is alert.      Comments: Patient still somewhat aphasic, but alert and oriented to person and place. CN II-XII intact.  All extremities 5/5 strength, no pronator drift noted, finger-to-nose normal, rapid alternating movements normal.           Lines/Drains/Airways       Drain  Duration             Female External Urinary Catheter 12/08/23 0549 <1 day              Peripheral Intravenous Line  Duration                  Peripheral IV - Single Lumen 12/07/23 1858 20 G Left Antecubital <1 day                    Significant Labs:    Lab Results   Component Value Date    WBC 12.85 (H) 12/08/2023    HGB 13.3 12/08/2023    HCT 39.9 12/08/2023    MCV 91.7 12/08/2023     12/08/2023           BMP  Lab Results   Component Value Date     12/07/2023    K 3.5 12/07/2023    CHLORIDE 104 12/07/2023    CO2 24 12/07/2023    BUN 21.1 (H) 12/07/2023    CREATININE 0.95 12/07/2023    CALCIUM 9.5 12/07/2023    EGFRNONAA 51 12/19/2018         ABG  No results for input(s): "PH", "PO2", "PCO2", "HCO3", "POCBASEDEF" in the last 168 hours.    Mechanical Ventilation Support:         Significant Imaging:  I have reviewed the pertinent imaging within the past 24 hours.        Assessment/Plan:     Assessment  Left MCA stroke s/p mechanical thrombectomy and intra-arterial tPA  History of brachial artery occlusion s/p thrombectomy  Hypothyroidism   Hypertension  Hyperlipidemia       Plan  Admitted to ICU for closer monitoring s/p mechanical thrombectomy with intra-arterial tPA  Patient remains slightly confused with aphasia, but without any other neuro deficits  Continue q2h neuro checks, if there are any neuro status changes, we will obtain stat CT  Plan for MRI brain today  P.r.n. antihypertensives ordered, plan to keep SBP < 180    DVT Prophylaxis:  We will hold for now, continue after 24 hour period of tPA  GI Prophylaxis:  Famotidine       Mike Jaramillo, DO  Pulmonary Critical Care " Medicine  [facility]  DOS: 12/08/2023

## 2023-12-08 NOTE — NURSING
Spoke with Dr Barr about putting in Ischemic Stroke order set. Orders were placed per telephone order.

## 2023-12-08 NOTE — PT/OT/SLP EVAL
[facility]  Speech Language Pathology Department  Clinical Swallow Evaluation    Patient Name:  Manisha Kirk   MRN:  [id number]    Recommendations:     General recommendations:  Speech/Language and Cognitive Evaluation  Diet texture/consistency recommendations:  Regular solids (IDDSI 7) and thin liquids (IDDSI 0)  Medications: per patient preference  Swallow strategies/precautions: small bites/sips and slow rate  Precautions: Standard, aspiration    History:     Manisha Kirk is a/n 78 y.o. female admitted following a right brachial embolectomy with Dr Beckford on 12/7 was noted to have aphasia and right sided weakness at around 2 AM when code fast was called. She underwent CTH, CTA which showed no hemorrhage and a left MCA occlusion.  Cerebral angiogram with Intra-arterial tPA was performed and right femoral artery access closed with angioseal.    Nursing requested SLP complete clincial swallow evaluation in lieu of nursing swallow screen.    Past Medical History:   Diagnosis Date    Autoimmune thyroiditis     HLD (hyperlipidemia)     Hypertension      Past Surgical History:   Procedure Laterality Date    CATARACT EXTRACTION      CHOLECYSTECTOMY      SURGICAL REMOVAL OF BONE SPUR      TONSILLECTOMY      TOTAL ABDOMINAL HYSTERECTOMY       Home diet texture/consistency: Regular and thin liquids  Current method of nutrition: NPO    Subjective     Patient awake, alert, and cooperative.  Receptive and expressive aphasia noted.    Patient goals: to eat/drink     Spiritual/Cultural/Restoration Beliefs/Practices that affect care: no  Pain/Comfort: Pain Rating 1: 0/10    Respiratory status: room air  Restraints/positioning devices: none    Objective:     ORAL MUSCULATURE  Dentition: own teeth  Secretion Management: adequate  Mucosal Quality: good  Facial Movement: WFL  Buccal Strength & Mobility: WFL  Mandibular Strength & Mobility: WFL  Oral Labial Strength & Mobility: WFL  Lingual Strength &  Mobility: WFL  Velar Elevation: unable to assess  Vocal Quality: adequate    Consistency Fed By Oral Symptoms Pharyngeal Symptoms   Thin liquid by spoon SLP None None   Thin liquid by cup Self None None   Puree SLP None None   Chewable solid Self None None   Thin liquid by straw Self None None     Assessment:     Oropharyngeal swallow WFL.  No overt/clinical signs/sx aspiration noted.    Patient Education:     Patient and family were provided with verbal education regarding SLP POC.  Understanding was verbalized.    Plan:     SLP Follow-Up:  Yes   Plan of Care reviewed with:  patient, son      Time Tracking:     SLP Treatment Date:   12/08/23  Speech Start Time:  0950  Speech Stop Time:  1005     Speech Total Time (min):  15 min    Billable minutes:  Swallow and Oral Function Evaluation, 15 minutes     12/08/2023

## 2023-12-08 NOTE — PROGRESS NOTES
[facility]  Neurology  Progress Note    Patient Name: Manisha Kirk  MRN: [id number]  Admission Date: 12/7/2023  Hospital Length of Stay: 1 days  Code Status: Full Code   Attending Provider: Asim Troy MD  Primary Care Physician: Piter Nolan II, MD   Principal Problem:Acute occlusion of brachial artery due to thrombosis      Subjective:     Interval History:   Nursing reports no new issues, has continued to improve since being in the ICU.     Current Neurological Medications:     Current Facility-Administered Medications   Medication Dose Route Frequency Provider Last Rate Last Admin    0.9%  NaCl infusion   Intravenous Continuous Fox Mcqueen  mL/hr at 12/08/23 0614 Rate Verify at 12/08/23 0614    aspirin chewable tablet 81 mg  81 mg Oral Daily Jh Beckford MD        carvediloL tablet 6.25 mg  6.25 mg Oral BID WM Jh Beckford MD        famotidine tablet 20 mg  20 mg Oral Daily Jh Beckford MD        [START ON 12/9/2023] heparin (porcine) injection 5,000 Units  5,000 Units Subcutaneous Q8H Mike Jaramillo DO        hydrALAZINE injection 10 mg  10 mg Intravenous Q2H PRN Mike Jaramillo DO        labetaloL injection 10 mg  10 mg Intravenous Q2H PRN Mike Jaramillo DO        levothyroxine tablet 25 mcg  25 mcg Oral Before breakfast Jh Beckford MD        melatonin tablet 6 mg  6 mg Oral Nightly PRN Jh Beckford MD        morphine injection 2 mg  2 mg Intravenous Q4H PRN Jh Beckford MD        morphine injection 4 mg  4 mg Intravenous Q4H PRN Jh Beckford MD        ondansetron injection 4 mg  4 mg Intravenous Q8H PRN Jh Beckford MD        sodium chloride 0.9% flush 10 mL  10 mL Intravenous PRN Jh Beckford MD        sodium chloride 0.9% flush 10 mL  10 mL Intravenous PRN Raheem Do MD        sodium chloride 0.9% flush 10 mL  10 mL Intravenous PRN Fox Mcqueen MD        sodium chloride 0.9% flush 10 mL  10 mL Intravenous PRN Mike Jaramillo DO          Facility-Administered Medications Ordered in Other Encounters   Medication Dose Route Frequency Provider Last Rate Last Admin    electrolyte-A infusion   Intravenous Continuous PRN Arnav Hand CRNA   New Bag at 12/07/23 2153    ePHEDrine sulfate   Intravenous PRN Arnav Hand CRNA   10 mg at 12/07/23 2151    fentaNYL injection   Intravenous PRN Arnav Hand CRNA   50 mcg at 12/07/23 2116    glycopyrrolate injection   Intravenous PRN Arnav Hand CRNA   0.2 mg at 12/07/23 2105    heparin (porcine) injection   Intravenous PRN Arnav Hand CRNA   5,000 Units at 12/07/23 2128    LIDOcaine (PF) 20 mg/mL (2%) injection   Intravenous PRN Arnav Hand CRNA   40 mg at 12/07/23 2057    ondansetron HCl (PF) injection   Intravenous PRN Arnav Hand CRNA   4 mg at 12/07/23 2105    phenylephrine HCl in 0.9% NaCl 1 mg/10 mL (100 mcg/mL) syringe   Intravenous PRN Arnav Hand CRNA   200 mcg at 12/07/23 2108    propofol (DIPRIVAN) 10 mg/mL infusion   Intravenous PRN Arnav Hand CRNA   120 mg at 12/07/23 2057    protamine injection   Intravenous PRN Arnav Hand CRNA   30 mg at 12/07/23 2217    rocuronium injection   Intravenous PRN Arnav Hand CRNA   50 mg at 12/07/23 2057    sugammadex (BRIDION) 100 mg/mL injection   Intravenous PRN Arnav Hand CRNA   200 mg at 12/07/23 2234       Review of Systems   Unable to perform ROS: Acuity of condition (limited information 2/2 acuity of condition/aphasia)     Objective:     Vital Signs (Most Recent):  Temp: 98.1 °F (36.7 °C) (12/08/23 0531)  Pulse: 68 (12/08/23 0700)  Resp: (!) 21 (12/08/23 0700)  BP: 123/71 (12/08/23 0700)  SpO2: (!) 92 % (12/08/23 0700) Vital Signs (24h Range):  Temp:  [97.5 °F (36.4 °C)-98.1 °F (36.7 °C)] 98.1 °F (36.7 °C)  Pulse:  [59-97] 68  Resp:  [12-24] 21  SpO2:  [92 %-100 %] 92 %  BP: (117-176)/() 123/71     Weight: 73.5 kg (162 lb)  Body mass index is 26.96 kg/m².     Physical Exam  Vitals and nursing note reviewed.    Constitutional:       General: She is not in acute distress.     Appearance: She is not ill-appearing or toxic-appearing.   HENT:      Head: Normocephalic.   Eyes:      General: No visual field deficit.     Pupils: Pupils are equal, round, and reactive to light.   Pulmonary:      Effort: Pulmonary effort is normal.   Musculoskeletal:         General: Normal range of motion.      Cervical back: Normal range of motion.      Right lower leg: No edema.      Left lower leg: No edema.   Skin:     General: Skin is warm and dry.      Capillary Refill: Capillary refill takes less than 2 seconds.   Neurological:      Mental Status: She is alert.      Cranial Nerves: No dysarthria (word finding difficulty/aphasia, unable to name thumb or palm, after several tries she was able to identify pen) or facial asymmetry.      Sensory: No sensory deficit.      Motor: No weakness, tremor, atrophy, abnormal muscle tone, seizure activity or pronator drift.      Coordination: Coordination normal. Finger-Nose-Finger Test normal.          NEUROLOGICAL EXAMINATION:     CRANIAL NERVES     CN III, IV, VI   Pupils are equal, round, and reactive to light.    GAIT AND COORDINATION      Coordination   Finger to nose coordination: normal      Significant Labs:   Recent Lab Results         12/08/23  0627   12/08/23  0158   12/07/23  1920   12/07/23  1811        Albumin/Globulin Ratio       0.9       Albumin       3.7       ALP       93       ALT       15       AST       18       Baso # 0.04       0.04       Basophil % 0.3       0.4       BILIRUBIN TOTAL       0.4       BUN       21.1       Calcium       9.5       Chloride       104       Cholesterol Total 207             CO2       24       CPK MB 1.5             Creatinine       0.95       eGFR       >60       Eos # 0.05       0.26       Eosinophil % 0.4       2.6       Globulin, Total       4.1       Glucose       126       HDL 39             Hematocrit 39.9       43.8       Hemoglobin 13.3        15.2       Immature Grans (Abs) 0.06       0.02       Immature Granulocytes 0.5       0.2       INR 1.1       0.9       LDL Cholesterol 121.00             Lymph # 2.29       2.56       LYMPH % 17.8       25.8       MCH 30.6       30.6       MCHC 33.3       34.7       MCV 91.7       88.3       Mono # 0.85       0.79       Mono % 6.6       8.0       MPV 10.5       10.5       Neut # 9.56       6.25       Neut % 74.4       63.0       nRBC 0.0       0.0       Platelet Count 233       265       POCT Glucose   116           Potassium       3.5       PROTEIN TOTAL       7.8       Protime 14.1       12.2       RBC 4.35       4.96       RDW 12.4       12.1       Sodium       139       Total Cholesterol/HDL Ratio 5             Triglycerides 234             Troponin I 0.064     0.078   0.067  Comment: QRY       TSH 3.139             Very Low Density Lipoprotein 47             WBC 12.85       9.92               Significant Imaging: I have reviewed all pertinent imaging results/findings within the past 24 hours.  Assessment and Plan:     Acute cerebrovascular accident (CVA) due to occlusion of left middle cerebral artery  -presented for right hand numbness and pallor on 12/7, underwent a right brachial embolectomy with Dr. Beckford  -developed aphasia and right sided weakness on 12/8 at 2:00 am, found to have a left MCA occlusion  -intervention: diagnostic angio with spontaneous recanalization of left MCA with distal emboli s/p  intra-arterial tPA   -etiology: TBD      Stroke workup  CT head: negative  CTA and and neck: L MCA occlusion      Plan  -Continue stroke workup  -therapy evals tomorrow (PT/OT/ST), symptom onset 2:00 am on 12/8  -HOB flat unless otherwise directed by Dr. Mcqueen  -Q1 hour neuro checks  -MRI brain today, will need repeat CT head (within 24 hours, 2:00 am on 12/9)  --180  -Hold antiplatelets/anticoagulation until repeat imaging confirms no hemorrhage      Further recommendations to follow from MD [facility]  Risk Mitigation (From admission, onward)           Ordered     heparin (porcine) injection 5,000 Units  Every 8 hours         12/08/23 0732     IP VTE HIGH RISK PATIENT  Once         12/07/23 1913     Place sequential compression device  Until discontinued         12/07/23 1913                    Bria Ogden NP  Neurology  [facility]

## 2023-12-08 NOTE — ANESTHESIA PREPROCEDURE EVALUATION
12/07/2023  Manisha Kirk is a 78 y.o., female.      Pre-op Assessment    I have reviewed the Patient Summary Reports.     I have reviewed the Nursing Notes. I have reviewed the NPO Status.   I have reviewed the Medications.     Review of Systems  Anesthesia Hx:  No problems with previous Anesthesia                Hematology/Oncology:  Hematology Normal   Oncology Normal                                   EENT/Dental:  EENT/Dental Normal           Cardiovascular:     Hypertension          PVD                              Pulmonary:  Pulmonary Normal                       Renal/:  Chronic Renal Disease, CKD                Hepatic/GI:  Hepatic/GI Normal                 Musculoskeletal:  Arthritis               Neurological:      Headaches                                 Endocrine:   Hypothyroidism        Obesity / BMI > 30  Dermatological:  Skin Normal    Psych:  Psychiatric Normal                  Physical Exam  General: Well nourished, Cooperative, Alert and Oriented    Airway:  Mallampati: II   Mouth Opening: Normal  TM Distance: Normal  Tongue: Normal  Neck ROM: Normal ROM    Dental:  Intact    Chest/Lungs:  Clear to auscultation    Heart:  Rate: Normal    Anesthesia Plan  Type of Anesthesia, risks & benefits discussed:    Anesthesia Type: Gen ETT  Intra-op Monitoring Plan: Standard ASA Monitors  Post Op Pain Control Plan: multimodal analgesia  Induction:  IV  Airway Plan: Direct  Informed Consent: Informed consent signed with the Patient and all parties understand the risks and agree with anesthesia plan.  All questions answered.   ASA Score: 3  Day of Surgery Review of History & Physical: H&P Update referred to the surgeon/provider.I have interviewed and examined the patient. I have reviewed the patient's H&P dated:   Anesthesia Plan Notes: Thrombectomy RUE    Ready For Surgery From Anesthesia  Perspective.   .

## 2023-12-08 NOTE — CONSULTS
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     Subjective:     Chief Complaint: Reason for Consult: Elevated Troponin     HPI: Ms. Kirk is a 79 y/o female who is unknown to [facility]. The patient presented to [facility] on 12.8.23 with c/o RUE Pain/Numbness. The patient was evaluated by her PCP on 12.7.23 with c/o RUE Pain/Numbness. She was sent to the ER for workup and had a RUE Arterial US which showed and occlusion of the Distal Brachial Artery. She had a CTA that confirmed the US results and was taken to the Cath Lab for a Thrombectomy of the RUE. She was stabilized and monitored for in the ICU. Around 2:00 AM on 12.8.23 she developed some RUE Weakness and Aphasia. She had a CT which revealed a L MCA Infarct. She subsequently had an Interventional Neurology Consult and she was taken back to the Cath Lab for a LVO and Intra-Arterial Thrombolysis. Post procedure she had some residual mild expressive aphasia. She was stabilized in ICU. She did have an abnormal Troponin Level and for this [facility] was consulted.     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 Normal wall thickness. There is severe concentric hypertrophy. There is mildly reduced systolic function. Apical hypokinesis Grade II diastolic dysfunction.  Right Ventricle: Normal right ventricular cavity size. Systolic function is normal. TAPSE is 1.61 cm.  Tricuspid Valve: There is mild regurgitation.  Pulmonary Artery: No pulmonary hypertension.  The upper mid segments and apical segments of the LV show severe hypertrophy. Simpsons cannot be performed due to the apex being closed off.    Review of patient's allergies indicates:   Allergen Reactions    Codeine      Other reaction(s): Vomiting    Penicillins      Other reaction(s): Vomiting     No current facility-administered medications on file prior to encounter.     Current Outpatient Medications on File Prior to Encounter   Medication Sig    amLODIPine (NORVASC) 5 MG tablet Take 5 mg by mouth once daily.    atorvastatin (LIPITOR) 20 MG tablet Take 20 mg by mouth once daily. 1 tablet by mouth daily    carvediloL (COREG) 6.25 MG tablet Take 6.25 mg by mouth 2 (two) times daily with meals.    pantoprazole (PROTONIX) 20 MG tablet TAKE 1 TABLET(20 MG) BY MOUTH EVERY DAY    vitamin D (VITAMIN D3) 1000 units Tab Take 1,000 Units by mouth once daily. 1 tablet by mouth once daily    estrogens, conjugated, (PREMARIN) 0.45 MG tablet TAKE 1 TABLET BY MOUTH DAILY    levothyroxine (SYNTHROID) 25 MCG tablet TAKE 1 TABLET BY MOUTH DAILY    ondansetron (ZOFRAN-ODT) 8 MG TbDL Take 8 mg by mouth every 8 (eight) hours as needed.    prochlorperazine (COMPAZINE) 10 MG tablet Take 1 tablet (10 mg total) by mouth every 6 (six) hours as needed (nausea). (Patient not taking: Reported on 12/7/2023)     Review of Systems   Constitutional:  Positive for fatigue.   Respiratory:  Negative for shortness of breath.    Cardiovascular:  Negative for chest pain, palpitations and leg swelling.   All other systems reviewed and are  negative.    Objective:     Vital Signs (Most Recent):  Temp: 98.2 °F (36.8 °C) (12/08/23 1200)  Pulse: 70 (12/08/23 1345)  Resp: (!) 23 (12/08/23 1345)  BP: 136/75 (12/08/23 1345)  SpO2: 98 % (12/08/23 1345) Vital Signs (24h Range):  Temp:  [97.5 °F (36.4 °C)-98.2 °F (36.8 °C)] 98.2 °F (36.8 °C)  Pulse:  [61-97] 70  Resp:  [12-27] 23  SpO2:  [91 %-100 %] 98 %  BP: (103-176)/() 136/75     Weight: 73.5 kg (162 lb)  Body mass index is 26.96 kg/m².    SpO2: 98 %         Intake/Output Summary (Last 24 hours) at 12/8/2023 1415  Last data filed at 12/8/2023 1200  Gross per 24 hour   Intake 2084.04 ml   Output 1750 ml   Net 334.04 ml     Lines/Drains/Airways       Drain  Duration             Female External Urinary Catheter 12/08/23 0549 <1 day              Peripheral Intravenous Line  Duration                  Peripheral IV - Single Lumen 12/07/23 1858 20 G Left Antecubital <1 day                  Significant Labs:  Recent Results (from the past 72 hour(s))   Comprehensive Metabolic Panel    Collection Time: 12/07/23  6:11 PM   Result Value Ref Range    Sodium Level 139 136 - 145 mmol/L    Potassium Level 3.5 3.5 - 5.1 mmol/L    Chloride 104 98 - 107 mmol/L    Carbon Dioxide 24 23 - 31 mmol/L    Glucose Level 126 (H) 82 - 115 mg/dL    Blood Urea Nitrogen 21.1 (H) 9.8 - 20.1 mg/dL    Creatinine 0.95 0.55 - 1.02 mg/dL    Calcium Level Total 9.5 8.4 - 10.2 mg/dL    Protein Total 7.8 (H) 5.8 - 7.6 gm/dL    Albumin Level 3.7 3.4 - 4.8 g/dL    Globulin 4.1 (H) 2.4 - 3.5 gm/dL    Albumin/Globulin Ratio 0.9 (L) 1.1 - 2.0 ratio    Bilirubin Total 0.4 <=1.5 mg/dL    Alkaline Phosphatase 93 40 - 150 unit/L    Alanine Aminotransferase 15 0 - 55 unit/L    Aspartate Aminotransferase 18 5 - 34 unit/L    eGFR >60 mls/min/1.73/m2   Troponin I    Collection Time: 12/07/23  6:11 PM   Result Value Ref Range    Troponin-I 0.067 (H) 0.000 - 0.045 ng/mL   Protime-INR    Collection Time: 12/07/23  6:11 PM   Result Value Ref Range    PT  12.2 (L) 12.5 - 14.5 seconds    INR 0.9 <=1.3   CBC with Differential    Collection Time: 12/07/23  6:11 PM   Result Value Ref Range    WBC 9.92 4.50 - 11.50 x10(3)/mcL    RBC 4.96 4.20 - 5.40 x10(6)/mcL    Hgb 15.2 12.0 - 16.0 g/dL    Hct 43.8 37.0 - 47.0 %    MCV 88.3 80.0 - 94.0 fL    MCH 30.6 27.0 - 31.0 pg    MCHC 34.7 33.0 - 36.0 g/dL    RDW 12.1 11.5 - 17.0 %    Platelet 265 130 - 400 x10(3)/mcL    MPV 10.5 (H) 7.4 - 10.4 fL    Neut % 63.0 %    Lymph % 25.8 %    Mono % 8.0 %    Eos % 2.6 %    Basophil % 0.4 %    Lymph # 2.56 0.6 - 4.6 x10(3)/mcL    Neut # 6.25 2.1 - 9.2 x10(3)/mcL    Mono # 0.79 0.1 - 1.3 x10(3)/mcL    Eos # 0.26 0 - 0.9 x10(3)/mcL    Baso # 0.04 <=0.2 x10(3)/mcL    IG# 0.02 0 - 0.04 x10(3)/mcL    IG% 0.2 %    NRBC% 0.0 %   Troponin I    Collection Time: 12/07/23  7:20 PM   Result Value Ref Range    Troponin-I 0.078 (H) 0.000 - 0.045 ng/mL   POCT glucose    Collection Time: 12/08/23  1:58 AM   Result Value Ref Range    POCT Glucose 116 (H) 70 - 110 mg/dL   Echo    Collection Time: 12/08/23  3:25 AM   Result Value Ref Range    BSA 1.83 m2    LVIDd 5.22 3.5 - 6.0 cm    LV Systolic Volume 49.50 mL    LV Systolic Volume Index 27.3 mL/m2    LVIDs 3.46 2.1 - 4.0 cm    LV Diastolic Volume 131.00 mL    LV Diastolic Volume Index 72.38 mL/m2    IVS 1.01 0.6 - 1.1 cm    FS 34 28 - 44 %    Left Ventricle Relative Wall Thickness 0.34 cm    Posterior Wall 0.88 0.6 - 1.1 cm    LV mass 181.30 g    LV Mass Index 100 g/m2    MV Peak E Calvin 0.63 m/s    TDI LATERAL 0.10 m/s    TDI SEPTAL 0.04 m/s    E/E' ratio 9.00 m/s    MV Peak A Calvin 0.62 m/s    E/A ratio 1.02     E wave deceleration time 187.00 msec    LV SEPTAL E/E' RATIO 15.75 m/s    LV LATERAL E/E' RATIO 6.30 m/s    LVOT peak calvin 0.97 m/s    Left Ventricular Outflow Tract Mean Velocity 0.62 cm/s    Left Ventricular Outflow Tract Mean Gradient 2.00 mmHg    RVDD 3.31 cm    TAPSE 1.61 cm    LA size 2.80 cm    LA volume (mod) 61.20 cm3    LA Volume Index (Mod)  33.8 mL/m2    AV mean gradient 7 mmHg    AV peak gradient 15 mmHg    Ao peak ray 1.91 m/s    Ao VTI 29.10 cm    LVOT peak VTI 17.50 cm    AV Velocity Ratio 0.51     AV index (prosthetic) 0.60     Mean e' 0.07 m/s    ZLVIDS 0.88     ZLVIDD 0.42    CBC with Differential    Collection Time: 12/08/23  6:27 AM   Result Value Ref Range    WBC 12.85 (H) 4.50 - 11.50 x10(3)/mcL    RBC 4.35 4.20 - 5.40 x10(6)/mcL    Hgb 13.3 12.0 - 16.0 g/dL    Hct 39.9 37.0 - 47.0 %    MCV 91.7 80.0 - 94.0 fL    MCH 30.6 27.0 - 31.0 pg    MCHC 33.3 33.0 - 36.0 g/dL    RDW 12.4 11.5 - 17.0 %    Platelet 233 130 - 400 x10(3)/mcL    MPV 10.5 (H) 7.4 - 10.4 fL    Neut % 74.4 %    Lymph % 17.8 %    Mono % 6.6 %    Eos % 0.4 %    Basophil % 0.3 %    Lymph # 2.29 0.6 - 4.6 x10(3)/mcL    Neut # 9.56 (H) 2.1 - 9.2 x10(3)/mcL    Mono # 0.85 0.1 - 1.3 x10(3)/mcL    Eos # 0.05 0 - 0.9 x10(3)/mcL    Baso # 0.04 <=0.2 x10(3)/mcL    IG# 0.06 (H) 0 - 0.04 x10(3)/mcL    IG% 0.5 %    NRBC% 0.0 %   Lipid panel    Collection Time: 12/08/23  6:27 AM   Result Value Ref Range    Cholesterol Total 207 (H) <=200 mg/dL    HDL Cholesterol 39 35 - 60 mg/dL    Triglyceride 234 (H) 37 - 140 mg/dL    Cholesterol/HDL Ratio 5 0 - 5    Very Low Density Lipoprotein 47     LDL Cholesterol 121.00 50.00 - 140.00 mg/dL   TSH    Collection Time: 12/08/23  6:27 AM   Result Value Ref Range    TSH 3.139 0.350 - 4.940 uIU/mL   CK-MB    Collection Time: 12/08/23  6:27 AM   Result Value Ref Range    Creatine Kinase MB 1.5 <=3.4 ng/mL   Protime-INR    Collection Time: 12/08/23  6:27 AM   Result Value Ref Range    PT 14.1 12.5 - 14.5 seconds    INR 1.1 <=1.3   Troponin I    Collection Time: 12/08/23  6:27 AM   Result Value Ref Range    Troponin-I 0.064 (H) 0.000 - 0.045 ng/mL   Hemoglobin A1C    Collection Time: 12/08/23  6:27 AM   Result Value Ref Range    Hemoglobin A1c 5.7 <=7.0 %    Estimated Average Glucose 116.9 mg/dL     Significant Imaging:  Imaging Results               CTA  Upper Extremity Right (Final result)  Result time 12/07/23 19:40:37      Final result by Yaron Mckinney MD (12/07/23 19:40:37)                   Narrative:    EXAMINATION  CTA UPPER EXTREMITY RIGHT    CLINICAL HISTORY  decreased R brachial arterial flow, evaluate for aneurysm formation;    TECHNIQUE  Pre- and post-contrast helical-acquisition CT images of the right upper extremity were obtained, contrast injection timed to coincide with arterial opacification for purposes of CT angiography.  Multiplanar reconstructions, to include MIP and volume rendered (3-D), were accomplished by a CT technologist at a separate workstation, pushed to PACS for physician review.    CONTRAST  IV: [id number], 100 mL    COMPARISON  None available at the time of initial interpretation.    FINDINGS  Images were reviewed in soft tissue and bone windows.    Exam quality: Overall limited secondary to suboptimal patient positioning and beam attenuation artifact produced by the extremity being adjacent to the patient's torso.    Vasculature: Included mediastinal vascular structures are unremarkable appearance.  There is normal intraluminal contrast opacification, course, and caliber of the pancreas of Ramon and visualized right carotid system.  Similar normal CT appearance of the right subclavian and axillary arteries.  The axillary branch vessels are unremarkable.  Normal contrast opacification is appreciated to the level of the distal humerus, with abrupt angiographic cut off at the distal brachial artery (series 14, images 146-152; series 19, image 31; series 21, image 24).  The subsequent acquisition of the extremity demonstrates no delayed intraluminal contrast beyond this region.  However, there is delayed collateral flow reconstitution to the hand but overall diminished appearance of typical intraluminal contrast opacification.  No definite focal vascular contour abnormality or aneurysmal dilatation is identified.    Nonvascular:  Regional subcutaneous tissues, musculature, and osseous structures are unremarkable.  There is no acute or focal abnormality of the visualized right lung or abdominopelvic structures.    IMPRESSION  1. Abrupt angiographic cut off of the distal right brachial artery, could be secondary to embolic occlusion, trauma, or other focal etiology.  Ultimate cause for vessel occlusion is not readily identified on the exam.  2. Delayed, but diminished collateral flow reconstitution with contrast runoff to the hand visualized.  ==========    This report was flagged in Epic as abnormal.    RADIATION DOSE  Automated tube current modulation, weight-based exposure dosing, and/or iterative reconstruction technique utilized to reach lowest reasonably achievable exposure rate.    DLP: 1641 mGy*cm      Electronically signed by: Yaron Mckinney  Date:    12/07/2023  Time:    19:40                                  EKG: Reviewed   Results for orders placed or performed during the hospital encounter of 12/07/23   EKG 12-lead    Narrative    Test Reason : M79.603,    Vent. Rate : 061 BPM     Atrial Rate : 061 BPM     P-R Int : 160 ms          QRS Dur : 090 ms      QT Int : 460 ms       P-R-T Axes : 072 071 142 degrees     QTc Int : 463 ms    Normal sinus rhythm  Minimal voltage criteria for LVH, may be normal variant ( Sokolow-Fleming )  ST and T wave abnormality, consider inferior ischemia  ST and T wave abnormality, consider anterolateral ischemia  Abnormal ECG  Confirmed by Todd Condon MD ([id number]) on 12/8/2023 12:41:59 AM    Referred By:             Confirmed By:Todd Condon MD     Telemetry: SR    Physical Exam  Constitutional:       Appearance: Normal appearance.   HENT:      Head: Normocephalic.      Mouth/Throat:      Mouth: Mucous membranes are moist.   Eyes:      Extraocular Movements: Extraocular movements intact.   Cardiovascular:      Rate and Rhythm: Normal rate and regular rhythm.      Pulses: Normal pulses.   Pulmonary:      Effort:  Pulmonary effort is normal.      Breath sounds: Normal breath sounds.   Abdominal:      Palpations: Abdomen is soft.   Musculoskeletal:         General: No swelling. Normal range of motion.   Skin:     General: Skin is warm and dry.   Neurological:      General: No focal deficit present.      Mental Status: She is alert and oriented to person, place, and time.      Comments: Some Expressive Aphasia   Psychiatric:         Mood and Affect: Mood normal.         Behavior: Behavior normal.       Home Medications:   No current facility-administered medications on file prior to encounter.     Current Outpatient Medications on File Prior to Encounter   Medication Sig Dispense Refill    amLODIPine (NORVASC) 5 MG tablet Take 5 mg by mouth once daily.      atorvastatin (LIPITOR) 20 MG tablet Take 20 mg by mouth once daily. 1 tablet by mouth daily      carvediloL (COREG) 6.25 MG tablet Take 6.25 mg by mouth 2 (two) times daily with meals.      pantoprazole (PROTONIX) 20 MG tablet TAKE 1 TABLET(20 MG) BY MOUTH EVERY DAY 90 tablet 3    vitamin D (VITAMIN D3) 1000 units Tab Take 1,000 Units by mouth once daily. 1 tablet by mouth once daily      estrogens, conjugated, (PREMARIN) 0.45 MG tablet TAKE 1 TABLET BY MOUTH DAILY 90 tablet 3    levothyroxine (SYNTHROID) 25 MCG tablet TAKE 1 TABLET BY MOUTH DAILY 90 tablet 3    ondansetron (ZOFRAN-ODT) 8 MG TbDL Take 8 mg by mouth every 8 (eight) hours as needed.      prochlorperazine (COMPAZINE) 10 MG tablet Take 1 tablet (10 mg total) by mouth every 6 (six) hours as needed (nausea). (Patient not taking: Reported on 12/7/2023) 30 tablet 1     Current Inpatient Medications:    Current Facility-Administered Medications:     0.9%  NaCl infusion, , Intravenous, Continuous, Fox Mcqueen MD, Last Rate: 100 mL/hr at 12/08/23 0614, Rate Verify at 12/08/23 0614    aspirin chewable tablet 81 mg, 81 mg, Oral, Daily, Jh Beckford MD    carvediloL tablet 6.25 mg, 6.25 mg, Oral, BID WM, Jh Beckford  MD MICAELA    famotidine tablet 20 mg, 20 mg, Oral, Daily, Jh Beckford MD, 20 mg at 12/08/23 1407    [START ON 12/9/2023] heparin (porcine) injection 5,000 Units, 5,000 Units, Subcutaneous, Q8H, Mike Jaramillo, DO    hydrALAZINE injection 10 mg, 10 mg, Intravenous, Q2H PRN, Mike Jaramillo, DO    labetaloL injection 10 mg, 10 mg, Intravenous, Q2H PRN, Mike Jaramillo, DO    levothyroxine tablet 25 mcg, 25 mcg, Oral, Before breakfast, Jh Beckford MD    melatonin tablet 6 mg, 6 mg, Oral, Nightly PRN, Jh Beckford MD    morphine injection 2 mg, 2 mg, Intravenous, Q4H PRN, Jh Beckford MD    morphine injection 4 mg, 4 mg, Intravenous, Q4H PRN, Jh Beckford MD    ondansetron injection 4 mg, 4 mg, Intravenous, Q8H PRN, Jh Beckford MD    sodium chloride 0.9% flush 10 mL, 10 mL, Intravenous, PRN, Jh Beckford MD    sodium chloride 0.9% flush 10 mL, 10 mL, Intravenous, PRN, Raheem Do MD    sodium chloride 0.9% flush 10 mL, 10 mL, Intravenous, PRN, Fox Mcqueen MD    sodium chloride 0.9% flush 10 mL, 10 mL, Intravenous, PRN, Mike Jaramillo, DO    Facility-Administered Medications Ordered in Other Encounters:     electrolyte-A infusion, , Intravenous, Continuous PRN, Arnav Hand CRNA, New Bag at 12/07/23 2153    ePHEDrine sulfate, , Intravenous, PRN, Arnav Hand CRNA, 10 mg at 12/07/23 2151    fentaNYL injection, , Intravenous, PRN, Arnav Hand CRNA, 50 mcg at 12/07/23 2116    glycopyrrolate injection, , Intravenous, PRN, Arnav Hand CRNA, 0.2 mg at 12/07/23 2105    heparin (porcine) injection, , Intravenous, PRN, Arnav Hand CRNA, 5,000 Units at 12/07/23 2128    LIDOcaine (PF) 20 mg/mL (2%) injection, , Intravenous, PRN, Arnav Hand CRNA, 40 mg at 12/07/23 2057    ondansetron HCl (PF) injection, , Intravenous, PRN, Arnav Hand CRNA, 4 mg at 12/07/23 2105    phenylephrine HCl in 0.9% NaCl 1 mg/10 mL (100 mcg/mL) syringe, , Intravenous, PRN, Arnav Hand CRNA, 200 mcg at 12/07/23 2108     propofol (DIPRIVAN) 10 mg/mL infusion, , Intravenous, PRN, Yazan, Arnav, CRNA, 120 mg at 12/07/23 2057    protamine injection, , Intravenous, PRN, Yazan, Arnav, CRNA, 30 mg at 12/07/23 2217    rocuronium injection, , Intravenous, PRN, Yazan, Arnav, CRNA, 50 mg at 12/07/23 2057    sugammadex (BRIDION) 100 mg/mL injection, , Intravenous, PRN, Yazan, Arnav, CRNA, 200 mg at 12/07/23 2234    VTE Risk Mitigation (From admission, onward)           Ordered     heparin (porcine) injection 5,000 Units  Every 8 hours         12/08/23 0732     IP VTE HIGH RISK PATIENT  Once         12/07/23 1913     Place sequential compression device  Until discontinued         12/07/23 1913                  Assessment:   NSTEMI - Type II in the Setting of CVA/RUE Arterial Occlusion     - Apical Hypokinesis; Upper Mid Segments and Apical Segments of the LV Show Severe Hypertrophy  Acute CVA with LVO of the L Middle Cerebral Artery s/p Intra-Arterial Thrombolysis  RUE Arterial Occlusion s/p RUE Arterial Angiogram requiring Thrombectomy  HTN  HLD  Autoimmune Thyroiditis     Plan:   Elevated Troponin is 2/2 Acute CVA and Thrombosis of RUE   ECHO Reviewed - Plan to Repeat ECHO as OP to Evaluate Wall Motion and EF  F/U with Dr. Akers in 1 Week up on D/C with ECHO and Results on F/U  We will be available as needed    Thank you for your consult.     Severo Goldsmith, ANP  Cardiology  [facility]    I have seen the patient, reviewed the Nurse Practitioner's note, assessment and plan. I have personally interviewed and examined the patient at bedside and agree with the findings. Medical decision making listed above were done under my guidance.    Physical exam:  Cardiovascular system: regular rhythm, no murmur.  Lungs: CTAB.  Extremities: No leg edema.    Plan:     12/08/2023      I have seen the patient, reviewed the Nurse Practitioner's note, assessment and plan. I have personally interviewed and examined the patient at bedside and  agree with the findings. Medical decision making listed above were done under my guidance.    Physical exam:  Cardiovascular system: regular rhythm, no murmur.  Lungs: CTAB.  Extremities: No leg edema.    Plan:  Patient appears to have a stress-induced cardiomyopathy repeat echo post discharge  Elevated troponin likely related to stress-induced cardiomyopathy

## 2023-12-08 NOTE — ANESTHESIA PROCEDURE NOTES
Intubation    Date/Time: 12/7/2023 8:58 PM    Performed by: Arnav Hand CRNA  Authorized by: Akira Lux MD    Intubation:     Induction:  Intravenous    Intubated:  Postinduction    Mask Ventilation:  Easy with oral airway    Attempts:  1    Attempted By:  CRNA    Method of Intubation:  Direct    Blade:  Tayla 3    Laryngeal View Grade: Grade I - full view of cords      Difficult Airway Encountered?: No      Complications:  None    Airway Device:  Oral endotracheal tube    Airway Device Size:  7.5    Style/Cuff Inflation:  Cuffed (inflated to minimal occlusive pressure)    Tube secured:  23    Secured at:  The lips    Placement Verified By:  Capnometry    Complicating Factors:  None    Findings Post-Intubation:  Atraumatic/condition of teeth unchanged and BS equal bilateral

## 2023-12-08 NOTE — BRIEF OP NOTE
Name: Manisha Kirk  MRN: [id number]  Age: 78 y.o.  Gender: female    Date of Procedure: 12/08/2023    Pre-operative Diagnosis: Left MCA occlusion    Post-operative Diagnosis: spontaneous recanalization of left MCA with distal emboli    Procedure: Cerebral angiogram with Intra-arterial tPA    Access/Closure: Right femoral artery access closed with angioseal    Surgeon: Fox Mcqueen MD    Anesthesia: GA    EBL: min    Description of findings:    - spontaneous recanalization of left MCA  - distal emboli in M4 branches  - administered 2 mg intra-arterial tPA in left MCA    Patient condition:   stable    Implant/specimen(s):  none    Plan:  - -180  - admit in ICU for q1h neurochecks  - hold antiplatelets/anticoagulation until MRI is completed and negative for hemorrhage  - MRI brain  - groin protocol     Fox Mcqueen MD  Interventional Neurology

## 2023-12-08 NOTE — ANESTHESIA POSTPROCEDURE EVALUATION
Anesthesia Post Evaluation    Patient: Manisha Kirk    Procedure(s) Performed: * No procedures listed *    Final Anesthesia Type: general      Patient location during evaluation: ICU  Patient participation: Yes- Able to Participate  Level of consciousness: awake  Post-procedure vital signs: reviewed and stable  Pain management: adequate  Airway patency: patent  NARINDER mitigation strategies: Multimodal analgesia  PONV status at discharge: No PONV      Cardiovascular status: blood pressure returned to baseline and hemodynamically stable  Respiratory status: unassisted and room air  Hydration status: euvolemic  Follow-up not needed.          Vitals Value Taken Time   /69 12/08/23 0546   Temp 36.7 °C (98.1 °F) 12/08/23 0531   Pulse 73 12/08/23 0550   Resp 23 12/08/23 0550   SpO2 97 % 12/08/23 0550   Vitals shown include unvalidated device data.      No case tracking events are documented in the log.      Pain/Justice Score: Justice Score: 10 (12/8/2023 12:00 AM)

## 2023-12-08 NOTE — NURSING
0145: Pt c/o pain 5/10 on puncture site, attempted to grab pt morphine PRN. Came in pt room, pt change of status, aphasic, unable to follow command with R facial drooping. Walter-RN charge notified, RRT-done, code FAST called, pt brought to CT per protocol. Dr. Beckford-notified via phone-ordered stat echo, awaiting on CT result. Pt closely monitored with BP in room-------[place]

## 2023-12-08 NOTE — SUBJECTIVE & OBJECTIVE
Interval History:  Patient is a 78-year-old female was admitted from the ER after being seen in the office for right-hand numbness and mild cyanosis.  Arterial ultrasound showed occlusion of distal brachial artery was sent to the emergency room for further evaluation CTA confirmed and she went for thrombectomy successfully.  However around 2:00 a.m. this morning developed some right extremity weakness and aphasia.  She went down for imaging showed left MCA infarct.  Interventional radiology intervened required intra-arterial thrombolysis.  She is awake alert oriented still with some mild expressive aphasia no deficits in her extremities she was placed in ICU for close monitoring.  Review of Systems   Constitutional: Negative.  Negative for chills and fever.   HENT: Negative.     Eyes: Negative.    Respiratory: Negative.  Negative for cough and shortness of breath.    Cardiovascular: Negative.  Negative for chest pain, palpitations and leg swelling.   Gastrointestinal: Negative.  Negative for abdominal distention, abdominal pain, constipation, diarrhea, nausea and vomiting.   Endocrine: Negative.    Genitourinary: Negative.  Negative for dysuria and hematuria.   Musculoskeletal: Negative.  Negative for arthralgias, back pain and joint swelling.   Skin: Negative.  Negative for rash.   Allergic/Immunologic: Negative.    Neurological: Negative.  Negative for dizziness, seizures and headaches.   Hematological: Negative.    Psychiatric/Behavioral: Negative.       Objective:     Vital Signs (Most Recent):  Temp: 97.7 °F (36.5 °C) (12/08/23 0800)  Pulse: 68 (12/08/23 1130)  Resp: 19 (12/08/23 1130)  BP: 114/70 (12/08/23 1130)  SpO2: (!) 93 % (12/08/23 1130) Vital Signs (24h Range):  Temp:  [97.5 °F (36.4 °C)-98.1 °F (36.7 °C)] 97.7 °F (36.5 °C)  Pulse:  [59-97] 68  Resp:  [12-26] 19  SpO2:  [91 %-100 %] 93 %  BP: (103-176)/() 114/70     Weight: 73.5 kg (162 lb)  Body mass index is 26.96 kg/m².    Intake/Output Summary  (Last 24 hours) at 12/8/2023 1204  Last data filed at 12/8/2023 0800  Gross per 24 hour   Intake 2084.04 ml   Output 1500 ml   Net 584.04 ml         Physical Exam  Eyes:      Pupils: Pupils are equal, round, and reactive to light.   Cardiovascular:      Rate and Rhythm: Normal rate.      Pulses: Normal pulses.      Heart sounds: No murmur heard.  Pulmonary:      Effort: Pulmonary effort is normal.      Breath sounds: Normal breath sounds.   Abdominal:      General: Abdomen is flat. Bowel sounds are normal. There is no distension.      Palpations: Abdomen is soft.      Tenderness: There is no guarding.   Musculoskeletal:         General: Normal range of motion.      Cervical back: Normal range of motion and neck supple.   Skin:     General: Skin is warm.   Neurological:      General: No focal deficit present.      Mental Status: She is alert.   Psychiatric:         Mood and Affect: Mood normal.         Behavior: Behavior normal.             Significant Labs: All pertinent labs within the past 24 hours have been reviewed.  Recent Lab Results  (Last 5 results in the past 24 hours)        12/08/23  0627   12/08/23  0325   12/08/23  0158   12/07/23  1920   12/07/23  1811        Albumin/Globulin Ratio         0.9       Albumin         3.7       ALP         93       ALT         15       Ao peak ray   1.91             Ao VTI   29.10             AST         18       AV mean gradient   7             AV index (prosthetic)   0.60             AV peak gradient   15             AV Velocity Ratio   0.51             Baso # 0.04         0.04       Basophil % 0.3         0.4       BILIRUBIN TOTAL         0.4       BSA   1.83             BUN         21.1       Calcium         9.5       Chloride         104       Cholesterol Total 207               CO2         24       CPK MB 1.5               Creatinine         0.95       Left Ventricle Relative Wall Thickness   0.34             E/A ratio   1.02             E/E' ratio   9.00              eGFR         >60       Eos # 0.05         0.26       Eosinophil % 0.4         2.6       Estimated Avg Glucose 116.9               E wave deceleration time   187.00             FS   34             Globulin, Total         4.1       Glucose         126       HDL 39               Hematocrit 39.9         43.8       Hemoglobin 13.3         15.2       Hemoglobin A1C External 5.7               Immature Grans (Abs) 0.06         0.02       Immature Granulocytes 0.5         0.2       INR 1.1         0.9       IVSd   1.01             LA size   2.80             LA volume   61.20             LA Volume Index (Mod)   33.8             LDL Cholesterol 121.00               LV LATERAL E/E' RATIO   6.30             LV SEPTAL E/E' RATIO   15.75             LV EDV BP   131.00             LV Diastolic Volume Index   72.38             LVIDd   5.22             LVIDs   3.46             LV mass   181.30             LV Mass Index   100             Left Ventricular Outflow Tract Mean Gradient   2.00             Left Ventricular Outflow Tract Mean Velocity   0.62             LVOT peak calvin   0.97             LVOT peak VTI   17.50             LV ESV BP   49.50             LV Systolic Volume Index   27.3             Lymph # 2.29         2.56       LYMPH % 17.8         25.8       MCH 30.6         30.6       MCHC 33.3         34.7       MCV 91.7         88.3       Mean e'   0.07             Mono # 0.85         0.79       Mono % 6.6         8.0       MPV 10.5         10.5       MV Peak A Calvin   0.62             MV Peak E Calvin   0.63             Neut # 9.56         6.25       Neut % 74.4         63.0       nRBC 0.0         0.0       Platelet Count 233         265       POCT Glucose     116           Potassium         3.5       PROTEIN TOTAL         7.8       Protime 14.1         12.2       Posterior Wall   0.88             RBC 4.35         4.96       RDW 12.4         12.1       RVDD   3.31             Sodium         139       TAPSE   1.61              TDI SEPTAL   0.04             TDI LATERAL   0.10             Total Cholesterol/HDL Ratio 5               Triglycerides 234               Troponin I 0.064       0.078   0.067  Comment: QRY       TSH 3.139               Very Low Density Lipoprotein 47               WBC 12.85         9.92       ZLVIDD   0.42             ZLVIDS   0.88                                    Significant Imaging: I have reviewed all pertinent imaging results/findings within the past 24 hours.

## 2023-12-08 NOTE — TRANSFER OF CARE
"Anesthesia Transfer of Care Note    Patient: Manisha Kirk    Procedure(s) Performed: * No procedures listed *    Patient location: ICU    Anesthesia Type: general    Transport from OR: Transported from OR on room air with adequate spontaneous ventilation    Post pain: adequate analgesia    Post assessment: no apparent anesthetic complications    Post vital signs: stable    Level of consciousness: awake    Nausea/Vomiting: no nausea/vomiting    Complications: none    Transfer of care protocol was followedComments: Report given to Icu nurses. Patient awake and follows commands appropriately. VSS.       Last vitals: Visit Vitals  /63 (BP Location: Left leg, Patient Position: Lying)   Pulse 88   Temp 36.5 °C (97.7 °F) (Skin)   Resp 12   Ht 5' 5" (1.651 m)   Wt 73.5 kg (162 lb)   SpO2 99%   BMI 26.96 kg/m²     "

## 2023-12-08 NOTE — ASSESSMENT & PLAN NOTE
Spontaneous left MCA stroke after thrombectomy right distal brachial artery earlier that day  Echocardiogram shows severe hypertrophy unable to calculate EF no no shunt low PFO.  She is recovering symptomatically.  Etiology unclear as to why she developed thrombosis.

## 2023-12-08 NOTE — NURSING
Dr Mcqueen communicated with patient's primary nurse that the patient will be taken to IR for a thrombectomy. Patient's vitals are stable at this time.

## 2023-12-08 NOTE — SEDATION DOCUMENTATION
4 mg TPA pulled for stroke intervention but only 2 mg given intra-arterially to LMCA occlusion by Dr. ABRAHAM Mcqueen

## 2023-12-08 NOTE — PROGRESS NOTES
[facility] - [facility] Hospital Medicine  Progress Note    Patient Name: Manisha Kirk  MRN: [id number]  Patient Class: IP- Inpatient   Admission Date: 12/7/2023  Length of Stay: 1 days  Attending Physician: Asim Troy MD  Primary Care Provider: Piter Nolan II, MD        Subjective:     Principal Problem:Acute occlusion of brachial artery due to thrombosis        HPI:  No notes on file    Overview/Hospital Course:  No notes on file    Interval History:  Patient is a 78-year-old female was admitted from the ER after being seen in the office for right-hand numbness and mild cyanosis.  Arterial ultrasound showed occlusion of distal brachial artery was sent to the emergency room for further evaluation CTA confirmed and she went for thrombectomy successfully.  However around 2:00 a.m. this morning developed some right extremity weakness and aphasia.  She went down for imaging showed left MCA infarct.  Interventional radiology intervened required intra-arterial thrombolysis.  She is awake alert oriented still with some mild expressive aphasia no deficits in her extremities she was placed in ICU for close monitoring.  Review of Systems   Constitutional: Negative.  Negative for chills and fever.   HENT: Negative.     Eyes: Negative.    Respiratory: Negative.  Negative for cough and shortness of breath.    Cardiovascular: Negative.  Negative for chest pain, palpitations and leg swelling.   Gastrointestinal: Negative.  Negative for abdominal distention, abdominal pain, constipation, diarrhea, nausea and vomiting.   Endocrine: Negative.    Genitourinary: Negative.  Negative for dysuria and hematuria.   Musculoskeletal: Negative.  Negative for arthralgias, back pain and joint swelling.   Skin: Negative.  Negative for rash.   Allergic/Immunologic: Negative.    Neurological: Negative.  Negative for dizziness, seizures and headaches.   Hematological: Negative.    Psychiatric/Behavioral: Negative.        Objective:     Vital Signs (Most Recent):  Temp: 97.7 °F (36.5 °C) (12/08/23 0800)  Pulse: 68 (12/08/23 1130)  Resp: 19 (12/08/23 1130)  BP: 114/70 (12/08/23 1130)  SpO2: (!) 93 % (12/08/23 1130) Vital Signs (24h Range):  Temp:  [97.5 °F (36.4 °C)-98.1 °F (36.7 °C)] 97.7 °F (36.5 °C)  Pulse:  [59-97] 68  Resp:  [12-26] 19  SpO2:  [91 %-100 %] 93 %  BP: (103-176)/() 114/70     Weight: 73.5 kg (162 lb)  Body mass index is 26.96 kg/m².    Intake/Output Summary (Last 24 hours) at 12/8/2023 1204  Last data filed at 12/8/2023 0800  Gross per 24 hour   Intake 2084.04 ml   Output 1500 ml   Net 584.04 ml         Physical Exam  Eyes:      Pupils: Pupils are equal, round, and reactive to light.   Cardiovascular:      Rate and Rhythm: Normal rate.      Pulses: Normal pulses.      Heart sounds: No murmur heard.  Pulmonary:      Effort: Pulmonary effort is normal.      Breath sounds: Normal breath sounds.   Abdominal:      General: Abdomen is flat. Bowel sounds are normal. There is no distension.      Palpations: Abdomen is soft.      Tenderness: There is no guarding.   Musculoskeletal:         General: Normal range of motion.      Cervical back: Normal range of motion and neck supple.   Skin:     General: Skin is warm.   Neurological:      General: No focal deficit present.      Mental Status: She is alert.   Psychiatric:         Mood and Affect: Mood normal.         Behavior: Behavior normal.             Significant Labs: All pertinent labs within the past 24 hours have been reviewed.  Recent Lab Results  (Last 5 results in the past 24 hours)        12/08/23  0627   12/08/23  0325   12/08/23  0158   12/07/23  1920   12/07/23  1811        Albumin/Globulin Ratio         0.9       Albumin         3.7       ALP         93       ALT         15       Ao peak ray   1.91             Ao VTI   29.10             AST         18       AV mean gradient   7             AV index (prosthetic)   0.60             AV peak gradient    15             AV Velocity Ratio   0.51             Baso # 0.04         0.04       Basophil % 0.3         0.4       BILIRUBIN TOTAL         0.4       BSA   1.83             BUN         21.1       Calcium         9.5       Chloride         104       Cholesterol Total 207               CO2         24       CPK MB 1.5               Creatinine         0.95       Left Ventricle Relative Wall Thickness   0.34             E/A ratio   1.02             E/E' ratio   9.00             eGFR         >60       Eos # 0.05         0.26       Eosinophil % 0.4         2.6       Estimated Avg Glucose 116.9               E wave deceleration time   187.00             FS   34             Globulin, Total         4.1       Glucose         126       HDL 39               Hematocrit 39.9         43.8       Hemoglobin 13.3         15.2       Hemoglobin A1C External 5.7               Immature Grans (Abs) 0.06         0.02       Immature Granulocytes 0.5         0.2       INR 1.1         0.9       IVSd   1.01             LA size   2.80             LA volume   61.20             LA Volume Index (Mod)   33.8             LDL Cholesterol 121.00               LV LATERAL E/E' RATIO   6.30             LV SEPTAL E/E' RATIO   15.75             LV EDV BP   131.00             LV Diastolic Volume Index   72.38             LVIDd   5.22             LVIDs   3.46             LV mass   181.30             LV Mass Index   100             Left Ventricular Outflow Tract Mean Gradient   2.00             Left Ventricular Outflow Tract Mean Velocity   0.62             LVOT peak ray   0.97             LVOT peak VTI   17.50             LV ESV BP   49.50             LV Systolic Volume Index   27.3             Lymph # 2.29         2.56       LYMPH % 17.8         25.8       MCH 30.6         30.6       MCHC 33.3         34.7       MCV 91.7         88.3       Mean e'   0.07             Mono # 0.85         0.79       Mono % 6.6         8.0       MPV 10.5         10.5       MV  Peak A Calvin   0.62             MV Peak E Calvin   0.63             Neut # 9.56         6.25       Neut % 74.4         63.0       nRBC 0.0         0.0       Platelet Count 233         265       POCT Glucose     116           Potassium         3.5       PROTEIN TOTAL         7.8       Protime 14.1         12.2       Posterior Wall   0.88             RBC 4.35         4.96       RDW 12.4         12.1       RVDD   3.31             Sodium         139       TAPSE   1.61             TDI SEPTAL   0.04             TDI LATERAL   0.10             Total Cholesterol/HDL Ratio 5               Triglycerides 234               Troponin I 0.064       0.078   0.067  Comment: QRY       TSH 3.139               Very Low Density Lipoprotein 47               WBC 12.85         9.92       ZLVIDD   0.42             ZLVIDS   0.88                                    Significant Imaging: I have reviewed all pertinent imaging results/findings within the past 24 hours.    Assessment/Plan:      Acute cerebrovascular accident (CVA) due to occlusion of left middle cerebral artery  Spontaneous left MCA stroke after thrombectomy right distal brachial artery earlier that day  Echocardiogram shows severe hypertrophy unable to calculate EF no no shunt low PFO.  She is recovering symptomatically.  Etiology unclear as to why she developed thrombosis.    Hypertension  Chronic, controlled. Latest blood pressure and vitals reviewed-     Temp:  [97.5 °F (36.4 °C)-98.1 °F (36.7 °C)]   Pulse:  [59-97]   Resp:  [12-26]   BP: (103-176)/()   SpO2:  [91 %-100 %] .   Home meds for hypertension were reviewed and noted below.   Hypertension Medications               amLODIPine (NORVASC) 5 MG tablet Take 5 mg by mouth once daily.    carvediloL (COREG) 6.25 MG tablet Take 6.25 mg by mouth 2 (two) times daily with meals.            While in the hospital, will manage blood pressure as follows; Continue home antihypertensive regimen    Will utilize p.r.n. blood pressure  medication only if patient's blood pressure greater than 180/110 and she develops symptoms such as worsening chest pain or shortness of breath.      VTE Risk Mitigation (From admission, onward)           Ordered     heparin (porcine) injection 5,000 Units  Every 8 hours         12/08/23 0732     IP VTE HIGH RISK PATIENT  Once         12/07/23 1913     Place sequential compression device  Until discontinued         12/07/23 1913                    Discharge Planning   IMER:      Code Status: Full Code   Is the patient medically ready for discharge?:     Reason for patient still in hospital (select all that apply): Treatment                     OLGA LIDIA FINK MD  Department of Hospital Medicine   [facility] - [facility]

## 2023-12-08 NOTE — NURSING
Spoke with Dr Barr about a code fast being called at 0201. Updated her on patient's status at this time. No new orders. Will update vascular and neurology following imaging.

## 2023-12-08 NOTE — NURSING
Nurses Note -- 4 Eyes      12/8/2023   4:50 PM      Skin assessed during: Daily Assessment      [x] No Altered Skin Integrity Present    [x]Prevention Measures Documented      [] Yes- Altered Skin Integrity Present or Discovered   [] LDA Added if Not in Epic (Describe Wound)   [] New Altered Skin Integrity was Present on Admit and Documented in LDA   [] Wound Image Taken    Wound Care Consulted? No    Attending Nurse:  Consuelo Gomez RN/Staff Member:  REESE Thornton

## 2023-12-08 NOTE — PT/OT/SLP PROGRESS
Physical Therapy      Patient Name:  Manisha Kirk   MRN:  [id number]    Patient not seen today for PT eval. Pt on bed rest until 12/9. Will follow-up when appropriate to mobilize.

## 2023-12-08 NOTE — CONSULTS
Neurointerventional consult note:    Subjective: 78 F admitted in the hospital following a right brachial embolectomy with Dr Beckford on 12/7 was noted to have aphasia and right sided weakness at around 2 AM when code fast was called. She underwent CTH, CTA which showed no hemorrhage and a left MCA occlusion. Not a candidate for IV TNK given the recent surgery yesterday.   Per nurse and stat team, patient strength has improved and is equal bilaterally now but continues to have speech deficit. She was completely aphasic and mute prior to the CT scan and is able to speak some words now.     Exam:  Vitals:    12/08/23 0201   BP: (!) 174/95   Pulse: 81   Resp:    Temp:        Alert & oriented x 2  EOMI, PERRLA, visual field intact in all 4 quadrants  Face symmetric, speech non fluent with paraphrasic errors. Only able to name 1/5 objects, repetition and comprehension not intact.   Tongue midline, facial sensation equal bilaterally  Strength 5/5 in bilateral upper and lower extremities   Sensation intact and equal bilaterally  Distal pulse intact. Skin warm.   Right brachial dressing s/p surgery    NIHSS 4     Imaging: CTH, CTA as above    A/P:   78 F with left MCA occlusion LKN around 2 AM. Not a candidate for IV TNK given the surgery yesterday.     Discussed the findings with patient and her son Irvin on the phone. Explained the indications, risks and benefits of mechanical thrombectomy given the disability from speech deficit. Son would like to proceed with the procedure.     DSA with thrombectomy.     Fox Mcqueen MD  Vascular and Interventional Neurology

## 2023-12-08 NOTE — NURSING
Nurses Note -- 4 Eyes      12/8/2023   8:08 AM      Skin assessed during: Transfer      [] No Altered Skin Integrity Present    []Prevention Measures Documented      [x] Yes- Altered Skin Integrity Present or Discovered   [x] LDA Added if Not in Epic (Describe Wound)   [] New Altered Skin Integrity was Present on Admit and Documented in LDA   [] Wound Image Taken    Wound Care Consulted? No    Attending Nurse:  Dennis Keller    Second RN/Staff Member:  Carmita

## 2023-12-08 NOTE — CONSULTS
Vascular Surgery Consultation Note       Patient Name: Manisha Kirk  Age: 78 y.o.  Sex: female  YOB: 1945  MRN: [id number]  Author: Jh Beckford MD  Location: [facility]     Date: 12/7/2023                                                    History of Present Illness   HPI     Hand Problem     Additional comments: RIGHT ARM NUMBNESS AND COLD, DR FINK SENT HERE   AFTER MRI SHOWED DISTAL RIGHT BRACHIAL ARTERY          Last edited by Laura Arnold RN on 12/7/2023  5:42 PM.      Manisha Kirk is a 78 y.o. female hypertension, gastroesophageal reflux, hypothyroid who presented to the emergency room after she was found to have right brachial artery occlusions.      Patient reported she has been having some hand pain for the last 5 days.  The patient reported that has progressively worsened with activities.      Patient reported she had an episode where her right hand went white and cold.  Patient reports that progressively she started to have worsening pain with activities and her right arm started getting more tired with activity.      Patient reported she can not even washed this is anymore with the way her right hand is right now.      Patient reports she recently had an echocardiogram in February and was told that everything was normal.    She has no history of traumas or any issues to her chest and no surgery in her right hand.      Patient is right-hand dominant.      Patient reports she had lunch at noon.                                                   Past Medical & Surgical History   Past Medical and Surgical History  Allergies :   Codeine and Penicillins    No current facility-administered medications on file prior to encounter.     Current Outpatient Medications on File Prior to Encounter   Medication Sig Dispense Refill    amLODIPine (NORVASC) 5 MG tablet Take 5 mg by mouth once daily.      carvediloL (COREG) 6.25 MG tablet Take 6.25 mg by mouth 2 (two) times  daily with meals.      estrogens, conjugated, (PREMARIN) 0.45 MG tablet TAKE 1 TABLET BY MOUTH DAILY 90 tablet 3    levothyroxine (SYNTHROID) 25 MCG tablet TAKE 1 TABLET BY MOUTH DAILY 90 tablet 3    ondansetron (ZOFRAN-ODT) 8 MG TbDL Take 8 mg by mouth every 8 (eight) hours as needed.      pantoprazole (PROTONIX) 20 MG tablet TAKE 1 TABLET(20 MG) BY MOUTH EVERY DAY 90 tablet 3    prochlorperazine (COMPAZINE) 10 MG tablet Take 1 tablet (10 mg total) by mouth every 6 (six) hours as needed (nausea). (Patient not taking: Reported on 12/7/2023) 30 tablet 1    [DISCONTINUED] ondansetron (ZOFRAN) 4 MG tablet Take 1 tablet (4 mg total) by mouth every 8 (eight) hours as needed for Nausea. (Patient not taking: Reported on 12/7/2023) 20 tablet 0    [DISCONTINUED] rosuvastatin (CRESTOR) 20 MG tablet TAKE 1 TABLET BY MOUTH DAILY 90 tablet 3      Medical :   She has a past medical history of Autoimmune thyroiditis, HLD (hyperlipidemia), and Hypertension.    Surgical :   She has a past surgical history that includes Cataract extraction; Cholecystectomy; Surgical removal of bone spur; Total abdominal hysterectomy; and Tonsillectomy.     Family History  Her family history is not on file.    Social History  She reports that she has never smoked. She has never used smokeless tobacco. She reports that she does not drink alcohol and does not use drugs.                                                  Review of Systems   Review of Systems   Constitutional:  Negative for fever and malaise/fatigue.   HENT: Negative.     Eyes: Negative.    Respiratory: Negative.     Cardiovascular: Negative.    Gastrointestinal: Negative.    Genitourinary: Negative.    Musculoskeletal: Negative.    Skin: Negative.    Neurological:  Positive for weakness.   Endo/Heme/Allergies: Negative.    Psychiatric/Behavioral: Negative.                                                    Laboratory Findings   Labs:  Recent Labs     12/07/23  1811   WBC 9.92   HGB 15.2  "  HCT 43.8   MCV 88.3   MCH 30.6   MCHC 34.7   RDW 12.1        Recent Labs     12/07/23  1811      K 3.5   BUN 21.1*   CREATININE 0.95   GLUCOSE 126*     Recent Labs     12/07/23  1811   CALCIUM 9.5     Recent Labs     12/07/23  1811   PROTIME 12.2*   INR 0.9     Recent Labs     12/07/23  1811   BILITOT 0.4   AST 18   ALT 15   ALKPHOS 93   ALBUMIN 3.7                                                  Physical Exam & Imaging   BP (!) 176/96   Pulse 86   Temp 98 °F (36.7 °C) (Oral)   Resp 20   Ht 5' 5" (1.651 m)   Wt 73.5 kg (162 lb)   SpO2 96%   BMI 26.96 kg/m²     Gen: alert, well appearing, and in no distress  HEENT: NCAT.   CVS: RRR   Chest: No increase work of breathing,   Abd: soft, nontender, nondistended, no masses or organomegaly.  : Voiding spontaneously  MSK: no joint tenderness, deformity or swelling  Neuro: alert, oriented, no focal findings or movement disorder noted.  Skin:  Paler compared to the left hand.      Palpable radial and ulnar pulse on the left upper extremity.  Nonpalpable radial ulnar pulse on the right upper extremity.  Able to move the risks in the fingers.  No sensation deficits.    Imaging:  CTA Upper Extremity Right    (Results Pending)                                                  Assessment & Recommendations     Patient Active Problem List   Diagnosis    Hypothyroidism    Mixed hyperlipidemia    Nausea    Aortic atherosclerosis    Chronic kidney disease, stage 3a    Autoimmune thyroiditis    Cataract of both eyes    Hypertension    Inflammation of sacroiliac joint    Migraine headache    Obesity       Assessment:  Manisha ARAIZA Kirk is 78 y.o. female with right brachial artery occlusion and thrombosis    Recommendations:  - I have discussed with the patient regarding management of the right artery brachial thrombosis.  Patient would benefit from embolectomy and restored blood flow to the right hand.      -Else also discussed with the patient that right now " patient has unknown etiology regarding why she would have thrombosis of occlusion of the brachial artery.      -Patient has normal sinus rhythm currently.      -We will also obtain CT scan of the right upper extremity to rule out central thrombus     -we will planning for right upper extremity embolectomy.      Risks and benefits were discussed with the patient.    Jh Beckford MD   Vascular Surgery

## 2023-12-08 NOTE — ANESTHESIA PROCEDURE NOTES
Intubation    Date/Time: 12/8/2023 4:10 AM    Performed by: Salvatore Hull CRNA  Authorized by: Akira Lux MD    Intubation:     Induction:  Intravenous    Intubated:  Postinduction    Mask Ventilation:  Easy mask    Attempts:  1    Attempted By:  CRNA    Method of Intubation:  Direct    Blade:  Simmons 2    Laryngeal View Grade: Grade I - full view of cords      Difficult Airway Encountered?: No      Complications:  None    Airway Device:  Oral endotracheal tube    Airway Device Size:  7.5    Style/Cuff Inflation:  Cuffed    Inflation Amount (mL):  7    Tube secured:  22    Secured at:  The lips    Placement Verified By:  Capnometry    Complicating Factors:  None    Findings Post-Intubation:  BS equal bilateral and atraumatic/condition of teeth unchanged

## 2023-12-09 LAB
ALBUMIN SERPL-MCNC: 2.8 G/DL (ref 3.4–4.8)
ALBUMIN/GLOB SERPL: 1 RATIO (ref 1.1–2)
ALP SERPL-CCNC: 70 UNIT/L (ref 40–150)
ALT SERPL-CCNC: 9 UNIT/L (ref 0–55)
AST SERPL-CCNC: 14 UNIT/L (ref 5–34)
BASOPHILS # BLD AUTO: 0.05 X10(3)/MCL
BASOPHILS NFR BLD AUTO: 0.5 %
BILIRUB SERPL-MCNC: 0.7 MG/DL
BUN SERPL-MCNC: 9.7 MG/DL (ref 9.8–20.1)
CALCIUM SERPL-MCNC: 7.2 MG/DL (ref 8.4–10.2)
CHLORIDE SERPL-SCNC: 109 MMOL/L (ref 98–107)
CO2 SERPL-SCNC: 22 MMOL/L (ref 23–31)
CREAT SERPL-MCNC: 0.74 MG/DL (ref 0.55–1.02)
EOSINOPHIL # BLD AUTO: 0.1 X10(3)/MCL (ref 0–0.9)
EOSINOPHIL NFR BLD AUTO: 1.1 %
ERYTHROCYTE [DISTWIDTH] IN BLOOD BY AUTOMATED COUNT: 12.5 % (ref 11.5–17)
GFR SERPLBLD CREATININE-BSD FMLA CKD-EPI: >60 MLS/MIN/1.73/M2
GLOBULIN SER-MCNC: 2.8 GM/DL (ref 2.4–3.5)
GLUCOSE SERPL-MCNC: 111 MG/DL (ref 82–115)
HCT VFR BLD AUTO: 35.6 % (ref 37–47)
HGB BLD-MCNC: 12.4 G/DL (ref 12–16)
IMM GRANULOCYTES # BLD AUTO: 0.04 X10(3)/MCL (ref 0–0.04)
IMM GRANULOCYTES NFR BLD AUTO: 0.4 %
LYMPHOCYTES # BLD AUTO: 2.39 X10(3)/MCL (ref 0.6–4.6)
LYMPHOCYTES NFR BLD AUTO: 25.4 %
MAGNESIUM SERPL-MCNC: 1.9 MG/DL (ref 1.6–2.6)
MCH RBC QN AUTO: 30.8 PG (ref 27–31)
MCHC RBC AUTO-ENTMCNC: 34.8 G/DL (ref 33–36)
MCV RBC AUTO: 88.6 FL (ref 80–94)
MONOCYTES # BLD AUTO: 0.96 X10(3)/MCL (ref 0.1–1.3)
MONOCYTES NFR BLD AUTO: 10.2 %
NEUTROPHILS # BLD AUTO: 5.86 X10(3)/MCL (ref 2.1–9.2)
NEUTROPHILS NFR BLD AUTO: 62.4 %
NRBC BLD AUTO-RTO: 0 %
PHOSPHATE SERPL-MCNC: 2.4 MG/DL (ref 2.3–4.7)
PLATELET # BLD AUTO: 213 X10(3)/MCL (ref 130–400)
PMV BLD AUTO: 10.3 FL (ref 7.4–10.4)
POTASSIUM SERPL-SCNC: 3.1 MMOL/L (ref 3.5–5.1)
PROT SERPL-MCNC: 5.6 GM/DL (ref 5.8–7.6)
RBC # BLD AUTO: 4.02 X10(6)/MCL (ref 4.2–5.4)
SODIUM SERPL-SCNC: 140 MMOL/L (ref 136–145)
WBC # SPEC AUTO: 9.4 X10(3)/MCL (ref 4.5–11.5)

## 2023-12-09 PROCEDURE — 25000003 PHARM REV CODE 250: Performed by: INTERNAL MEDICINE

## 2023-12-09 PROCEDURE — 97167 OT EVAL HIGH COMPLEX 60 MIN: CPT

## 2023-12-09 PROCEDURE — 84100 ASSAY OF PHOSPHORUS: CPT | Performed by: INTERNAL MEDICINE

## 2023-12-09 PROCEDURE — 11000001 HC ACUTE MED/SURG PRIVATE ROOM

## 2023-12-09 PROCEDURE — 99233 PR SUBSEQUENT HOSPITAL CARE,LEVL III: ICD-10-PCS | Mod: 95,,, | Performed by: INTERNAL MEDICINE

## 2023-12-09 PROCEDURE — 97161 PT EVAL LOW COMPLEX 20 MIN: CPT

## 2023-12-09 PROCEDURE — 63600175 PHARM REV CODE 636 W HCPCS

## 2023-12-09 PROCEDURE — 25000003 PHARM REV CODE 250: Performed by: PSYCHIATRY & NEUROLOGY

## 2023-12-09 PROCEDURE — 99233 PR SUBSEQUENT HOSPITAL CARE,LEVL III: ICD-10-PCS | Mod: ,,, | Performed by: PSYCHIATRY & NEUROLOGY

## 2023-12-09 PROCEDURE — 99233 SBSQ HOSP IP/OBS HIGH 50: CPT | Mod: 95,,, | Performed by: INTERNAL MEDICINE

## 2023-12-09 PROCEDURE — 83735 ASSAY OF MAGNESIUM: CPT | Performed by: INTERNAL MEDICINE

## 2023-12-09 PROCEDURE — 25000003 PHARM REV CODE 250: Performed by: SURGERY

## 2023-12-09 PROCEDURE — 99233 SBSQ HOSP IP/OBS HIGH 50: CPT | Mod: ,,, | Performed by: PSYCHIATRY & NEUROLOGY

## 2023-12-09 PROCEDURE — 80053 COMPREHEN METABOLIC PANEL: CPT

## 2023-12-09 PROCEDURE — 85025 COMPLETE CBC W/AUTO DIFF WBC: CPT | Performed by: SURGERY

## 2023-12-09 RX ORDER — ATORVASTATIN CALCIUM 40 MG/1
40 TABLET, FILM COATED ORAL DAILY
Status: DISCONTINUED | OUTPATIENT
Start: 2023-12-09 | End: 2023-12-11 | Stop reason: HOSPADM

## 2023-12-09 RX ORDER — POTASSIUM CHLORIDE 20 MEQ/1
40 TABLET, EXTENDED RELEASE ORAL ONCE
Status: COMPLETED | OUTPATIENT
Start: 2023-12-09 | End: 2023-12-09

## 2023-12-09 RX ORDER — ASPIRIN 325 MG
325 TABLET, DELAYED RELEASE (ENTERIC COATED) ORAL DAILY
Status: DISCONTINUED | OUTPATIENT
Start: 2023-12-09 | End: 2023-12-11 | Stop reason: HOSPADM

## 2023-12-09 RX ADMIN — ASPIRIN 325 MG: 325 TABLET, COATED ORAL at 04:12

## 2023-12-09 RX ADMIN — CARVEDILOL 6.25 MG: 3.12 TABLET, FILM COATED ORAL at 04:12

## 2023-12-09 RX ADMIN — CARVEDILOL 6.25 MG: 3.12 TABLET, FILM COATED ORAL at 08:12

## 2023-12-09 RX ADMIN — HEPARIN SODIUM 5000 UNITS: 5000 INJECTION INTRAVENOUS; SUBCUTANEOUS at 09:12

## 2023-12-09 RX ADMIN — MUPIROCIN: 20 OINTMENT TOPICAL at 09:12

## 2023-12-09 RX ADMIN — ATORVASTATIN CALCIUM 40 MG: 40 TABLET, FILM COATED ORAL at 02:12

## 2023-12-09 RX ADMIN — FAMOTIDINE 20 MG: 20 TABLET ORAL at 08:12

## 2023-12-09 RX ADMIN — SODIUM CHLORIDE: 9 INJECTION, SOLUTION INTRAVENOUS at 01:12

## 2023-12-09 RX ADMIN — POTASSIUM CHLORIDE 40 MEQ: 1500 TABLET, EXTENDED RELEASE ORAL at 01:12

## 2023-12-09 RX ADMIN — MUPIROCIN: 20 OINTMENT TOPICAL at 08:12

## 2023-12-09 NOTE — NURSING
Nurses Note -- 4 Eyes      12/9/2023   11:37 AM      Skin assessed during: Daily Assessment      [x] No Altered Skin Integrity Present    [x]Prevention Measures Documented      [] Yes- Altered Skin Integrity Present or Discovered   [] LDA Added if Not in Epic (Describe Wound)   [] New Altered Skin Integrity was Present on Admit and Documented in LDA   [] Wound Image Taken    Wound Care Consulted? No    Attending Nurse:  Anna Goemz RN/Staff Member:  STEFFANIE

## 2023-12-09 NOTE — PROGRESS NOTES
[facility] - [facility] ICU  Pulmonary Critical Care Note    Patient Name: Manisha Kirk  MRN: [id number]  Admission Date: 12/7/2023  Hospital Length of Stay: 2 days  Code Status: Full Code  Attending Provider: Asim Troy MD  Primary Care Provider: Piter Nolan II, MD     Subjective:     HPI:   This is a 77 y/o female who presented from [facility] with right upper extremity ischemic changes, found to have right brachial artery occlusion on ultrasound.  Underwent mechanical thrombectomy with clot discovered and brachial, radial and ulnar arteries.  She was subsequently admitted to the hospital for ongoing monitoring, but developed acute onset aphasia and right-sided weakness. Code FAST was called and CT head demonstrated no acute changes.  CTA head/neck has a preliminary read of no acute abnormalities, but interventional neurology discovered a left MCA occlusion.  She was subsequently taken for mechanical thrombectomy on 12/8/2023 and admitted to the ICU for ongoing care.     Hospital Course/Significant events:  As above     24 Hour Interval History:  Currently lying in bed. Feels okay but is struggling with expressive aphasia.     Past Medical History:   Diagnosis Date    Autoimmune thyroiditis     HLD (hyperlipidemia)     Hypertension        Past Surgical History:   Procedure Laterality Date    CATARACT EXTRACTION      CHOLECYSTECTOMY      SURGICAL REMOVAL OF BONE SPUR      TONSILLECTOMY      TOTAL ABDOMINAL HYSTERECTOMY         Social History     Socioeconomic History    Marital status:    Tobacco Use    Smoking status: Never    Smokeless tobacco: Never   Substance and Sexual Activity    Alcohol use: Never    Drug use: Never     Social Determinants of Health     Financial Resource Strain: Low Risk  (8/30/2022)    Overall Financial Resource Strain (CARDIA)     Difficulty of Paying Living Expenses: Not hard at all   Food Insecurity: No Food Insecurity (8/30/2022)     Hunger Vital Sign     Worried About Running Out of Food in the Last Year: Never true     Ran Out of Food in the Last Year: Never true   Transportation Needs: No Transportation Needs (8/30/2022)    PRAPARE - Transportation     Lack of Transportation (Medical): No     Lack of Transportation (Non-Medical): No   Stress: No Stress Concern Present (8/30/2022)    [facility] [employer] - Occupational Stress Questionnaire     Feeling of Stress : Not at all   Housing Stability: Low Risk  (8/30/2022)    Housing Stability Vital Sign     Unable to Pay for Housing in the Last Year: No     Number of Places Lived in the Last Year: 1     Unstable Housing in the Last Year: No       Current Outpatient Medications   Medication Instructions    amLODIPine (NORVASC) 5 mg, Oral, Daily    atorvastatin (LIPITOR) 20 mg, Oral, Daily, 1 tablet by mouth daily    carvediloL (COREG) 6.25 mg, Oral, 2 times daily with meals    estrogens, conjugated, (PREMARIN) 0.45 MG tablet TAKE 1 TABLET BY MOUTH DAILY    levothyroxine (SYNTHROID) 25 mcg, Oral    ondansetron (ZOFRAN-ODT) 8 mg, Oral, Every 8 hours PRN    pantoprazole (PROTONIX) 20 MG tablet TAKE 1 TABLET(20 MG) BY MOUTH EVERY DAY    prochlorperazine (COMPAZINE) 10 mg, Oral, Every 6 hours PRN    vitamin D (VITAMIN D3) 1,000 Units, Oral, Daily, 1 tablet by mouth once daily       Current Inpatient Medications   aspirin  81 mg Oral Daily    carvediloL  6.25 mg Oral BID WM    famotidine  20 mg Oral Daily    heparin (porcine)  5,000 Units Subcutaneous Q8H    levothyroxine  25 mcg Oral Before breakfast    mupirocin   Nasal BID     Current Intravenous Infusions   sodium chloride 0.9% 100 mL/hr at 12/08/23 1800     Review of Systems   Neurological:         Expressive aphasia    All other systems reviewed and are negative.         Objective:       Intake/Output Summary (Last 24 hours) at 12/9/2023 0942  Last data filed at 12/9/2023 0800  Gross per 24 hour   Intake 897.01 ml   Output 1400 ml    Net -502.99 ml         Vital Signs (Most Recent):  Temp: 99.2 °F (37.3 °C) (12/09/23 0800)  Pulse: (!) 55 (12/09/23 0742)  Resp: (!) 23 (12/09/23 0742)  BP: 114/65 (12/09/23 0817)  SpO2: (!) 92 % (12/09/23 0742)  Body mass index is 26.96 kg/m².  Weight: 73.5 kg (162 lb) Vital Signs (24h Range):  Temp:  [97.9 °F (36.6 °C)-99.2 °F (37.3 °C)] 99.2 °F (37.3 °C)  Pulse:  [55-75] 55  Resp:  [12-30] 23  SpO2:  [92 %-100 %] 92 %  BP: (103-141)/(52-92) 114/65     Physical Exam  Constitutional:       Appearance: Normal appearance.   HENT:      Head: Normocephalic and atraumatic.      Mouth/Throat:      Mouth: Mucous membranes are moist.      Pharynx: Oropharynx is clear.   Cardiovascular:      Rate and Rhythm: Normal rate and regular rhythm.      Heart sounds: Normal heart sounds.   Pulmonary:      Effort: Pulmonary effort is normal.      Breath sounds: Normal breath sounds.   Abdominal:      General: Bowel sounds are normal.      Palpations: Abdomen is soft.   Musculoskeletal:      Right lower leg: No edema.      Left lower leg: No edema.   Neurological:      General: No focal deficit present.      Mental Status: She is alert and oriented to person, place, and time.      Comments: Strong and equal with all ext; some expressive aphasia noted.    Psychiatric:         Mood and Affect: Mood normal.         Behavior: Behavior normal.         Thought Content: Thought content normal.         Judgment: Judgment normal.           Lines/Drains/Airways       Drain  Duration             Female External Urinary Catheter 12/08/23 0549 1 day              Peripheral Intravenous Line  Duration                  Peripheral IV - Single Lumen 12/07/23 1858 20 G Left Antecubital 1 day                    Significant Labs:    Lab Results   Component Value Date    WBC 9.40 12/09/2023    HGB 12.4 12/09/2023    HCT 35.6 (L) 12/09/2023    MCV 88.6 12/09/2023     12/09/2023           BMP  Lab Results   Component Value Date     12/09/2023  "   K 3.1 (L) 12/09/2023    CHLORIDE 109 (H) 12/09/2023    CO2 22 (L) 12/09/2023    BUN 9.7 (L) 12/09/2023    CREATININE 0.74 12/09/2023    CALCIUM 7.2 (L) 12/09/2023    EGFRNONAA 51 12/19/2018         ABG  No results for input(s): "PH", "PO2", "PCO2", "HCO3", "POCBASEDEF" in the last 168 hours.      Significant Imaging:  CTA Head and Neck (xpd)  Narrative: EXAMINATION:  CTA HEAD AND NECK (XPD)    CLINICAL HISTORY:  Neuro deficit, acute, stroke suspected;    TECHNIQUE:  Non contrast low dose axial images were obtained through the head.  CT angiogram was performed from the level of the radha to the top of the head following the IV administration 100 cc of Isovue 370 contrast .  Sagittal and coronal reconstructions and maximum intensity projection reconstructions were performed. Arterial stenosis percentages are based on NASCET measurement criteria.  Additional multiplanar reconstructions were performed on post processed imaging.  Automatic exposure control (AEC) is utilized to reduce patient radiation exposure.    COMPARISON:  None    INTRACRANIAL VASCULAR STRUCTURES  Internal carotid arteries: Mild atheromatous calcification of the supraclinoid segment of the right internal carotid artery is seen.    Middle cerebral arteries: Unremarkable.    Anterior cerebral arteries: Unremarkable.    Vertebral arteries: The vertebrobasilar junction is unremarkable. Both vertebral arteries are patent and normal in caliber.    Basilar artery: Unremarkable.    Posterior cerebral arteries: Unremarkable.    Neck Vascular structures: The visualized aorta and origin of the great vessels of the neck appear unremarkable.    Carotids:Common carotid arteries: The right and the left common carotid arteries appear unremarkable. No atheromatous calcification is seen in the carotid arteries.    Internal carotid artery: The right and the left internal carotid arteries appear unremarkable.    Vertebral arteries: The origins of both vertebral " arteries are unremarkable. Both vertebral arteries are patent and normal in caliber.    Brain parenchyma: No abnormal brain parenchymal enhancement is seen.    .  Impression: 1. Unremarkable CT angiogram of the head. Unremarkable CT angiogram of the neck. Details and findings as noted above.    I concur with the preliminary report above.    Electronically signed by: Miller Kauffman  Date:    12/08/2023  Time:    13:17  IR Thrombectomy Intracranial Inc all Imaging  Narrative: EXAMINATION:  IR THROMBECTOMY INTRACRANIAL INC ALL IMAGING    CLINICAL HISTORY:  stroke;    FINDINGS:  Fluoroscopic assistance provided during vascular procedure.  Images were independently interpreted by the attending physician performing the procedure.    Fluoro time 8 minutes.    Reference Air Kerma: 185 mGy.  Impression: Fluoroscopic assistance provided as above.    Electronically signed by: Agustin Renee  Date:    12/08/2023  Time:    09:36  Echo    Left Ventricle: The left ventricle is mildly dilated. Normal wall   thickness. There is severe concentric hypertrophy. There is mildly reduced   systolic function. Apical hypokinesis Grade II diastolic dysfunction.    Right Ventricle: Normal right ventricular cavity size. Systolic   function is normal. TAPSE is 1.61 cm.    Tricuspid Valve: There is mild regurgitation.    Pulmonary Artery: No pulmonary hypertension.    The upper mid segments and apical segments of the LV show severe   hypertrophy. Simpsons cannot be performed due to the apex being closed   off.  CT Head Without Contrast  Narrative: EXAMINATION:  CT HEAD WITHOUT CONTRAST    CLINICAL HISTORY:  Mental status change, unknown cause;    TECHNIQUE:  Low dose axial CT images obtained throughout the head without intravenous contrast.  Axial, sagittal and coronal reconstructions were performed and interpreted.    DLP: 1001 mGycm    All CT scans at this location are performed using dose optimization techniques as appropriate to a performed  "exam including the following automated exposure control, adjustment of the mA and/or kV according to patient size and/or use of iterative reconstruction technique    COMPARISON:  CT head 10/27/2016    FINDINGS:  BRAIN: Gray white differentiation is maintained. Periventricular and subcortical white matter changes most compatible with chronic small vessel ischemic disease.  Mild cerebral atrophy.  No hemorrhage. No edema. No mass effect or midline shift.  The posterior fossa and midline structures are unremarkable.    VENTRICLES: Normal in size and configuration.    EXTRA-AXIAL: No abnormal extra-axial collections.    BONES: Calvarium is intact.    SINUSES AND MASTOIDS: Visualized paranasal sinuses and mastoid air cells are clear.  Impression: No appreciable acute intracranial abnormality.    Periventricular and subcortical white matter changes most compatible with chronic small vessel ischemic disease.    The preliminary and final reports are concordant.  Per Dr. Leija's preliminary report: "Notifications: This is a stroke protocol report in the results were discussed with the patient's nurse Manny prior to dictation at 12/08/2023 at 02:49 hours. "    Electronically signed by: Colleen Orozco  Date:    12/08/2023  Time:    07:30     Echo:     Left Ventricle: The left ventricle is mildly dilated. Normal wall thickness. There is severe concentric hypertrophy. There is mildly reduced systolic function. Apical hypokinesis Grade II diastolic dysfunction.    Right Ventricle: Normal right ventricular cavity size. Systolic function is normal. TAPSE is 1.61 cm.    Tricuspid Valve: There is mild regurgitation.    Pulmonary Artery: No pulmonary hypertension.    The upper mid segments and apical segments of the LV show severe hypertrophy. Simpsons cannot be performed due to the apex being closed off.    Assessment/Plan:     Assessment  Left MCA stroke s/p mechanical thrombectomy and intra-arterial tPA  History of brachial " artery occlusion s/p thrombectomy  Hypothyroidism   Hypertension  Hyperlipidemia     Plan  Suspect she will be able to downgrade to a floor   Continue stroke protocol   Abnormal echo noted. Cardiology has evaluated and seen patient plans to follow as an outpatient with repeat echo   Seems like the patient will need to be anticoagulated however neurology wants an MRI prior to administrating antiplatelets and anticoagulation. Nursing reports MRI is scheduled for today hopefully around noon.          OFELIA Recio  Pulmonary Critical Care Medicine  [facility] - [facility] ICU  DOS: 12/09/2023

## 2023-12-09 NOTE — PROGRESS NOTES
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      Interval history:  Patient neurologically stable, clear speech, no dysphagia, ST OT has signed out.  Patient with no acute neurological deficits fortunately   Rounded with the nurse, patient with mild hypokalemia on p.o. supplements will recheck those labs in the morning.  Patient has been downgraded accepted for Dr. Nolan , it looks like she will only require speech therapy      OBJECTIVE:     Vital Signs Range (Last 24H):  Temp:  [97.9 °F (36.6 °C)-99.2 °F (37.3 °C)]   Pulse:  [55-71]   Resp:  [12-30]   BP: (108-141)/(52-92)   SpO2:  [92 %-100 %] Body mass index is 26.96 kg/m².  Wt Readings from Last 3 Encounters:   12/07/23 1845 73.5 kg (162 lb)   12/07/23 1406 74.4 kg (164 lb)   09/06/23 1345 74.8 kg (165 lb)       I & O (Last 24H):  Intake/Output Summary (Last 24 hours) at 12/9/2023 1442  Last data filed at 12/9/2023 0800  Gross per 24 hour   Intake 396.96 ml   Output 1150 ml   Net -753.04 ml       Physical Exam:  Patient alert oriented x3 in the company of the son and the daughter-in-law in good spirits very pleasant lady   HEENT normocephalic atraumatic facial muscles are symmetric speech is clear   Heart regular rhythm  Lungs are clear bilateral no wheezing rales or rhonchi   Abdomen benign  Extremities no clubbing cyanosis or edema    Diagnostic Results:  Lab Results   Component Value Date    WBC 9.40 12/09/2023    HGB 12.4 12/09/2023    HCT 35.6 (L) 12/09/2023    MCV 88.6 12/09/2023     12/09/2023     Recent Labs   Lab 12/09/23  0120      K 3.1*   CO2 22*   BUN 9.7*   CREATININE 0.74   CALCIUM 7.2*   MG 1.90     Lab Results   Component Value Date    INR 1.1 12/08/2023    INR 0.9 12/07/2023     PROTIME 14.1 12/08/2023    PROTIME 12.2 (L) 12/07/2023     Lab Results   Component Value Date    HGBA1C 5.7 12/08/2023     Recent Labs     12/08/23  0158   POCTGLUCOSE 116*       ASSESSMENT/PLAN:     Active Hospital Problems    Diagnosis  POA    Acute cerebrovascular accident (CVA) due to occlusion of left middle cerebral artery [I63.512]  No    Hypertension [I10]  Yes      Resolved Hospital Problems    Diagnosis Date Resolved POA    *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 Beckford  -developed aphasia and right sided weakness on 12/8 at 2:00 am, found to have a left MCA occlusion  -intervention: diagnostic angio with spontaneous recanalization of left MCA with distal emboli s/p  intra-arterial tPA   -etiology: TBD      Stroke workup  CT head: negative  CTA and and neck: L MCA occlusion        Plan  -Continue stroke workup  -therapy evals tomorrow (PT/OT/ST), symptom onset 2:00 am on 12/8  -HOB flat unless otherwise directed by Dr. Mcqueen  -Q1 hour neuro checks  -MRI brain today, will need repeat CT head (within 24 hours, 2:00 am on 12/9)  --180  -Hold antiplatelets/anticoagulation until repeat imaging confirms no hemorrhage        Spontaneous left MCA stroke after thrombectomy right distal brachial artery earlier that day  Echocardiogram shows severe hypertrophy unable to calculate EF no no shunt low PFO.  She is recovering symptomatically.  Etiology unclear as to why she developed thrombosis.    Hypertension  Chronic, controlled. Latest blood pressure and vitals reviewed-     Temp:  [97.5 °F (36.4 °C)-98.1 °F (36.7 °C)]   Pulse:  [59-97]   Resp:  [12-26]   BP: (103-176)/()   SpO2:  [91 %-100 %] .   Home meds for hypertension were reviewed and noted  below.   Hypertension Medications               amLODIPine (NORVASC) 5 MG tablet Take 5 mg by mouth once daily.    carvediloL (COREG) 6.25 MG tablet Take 6.25 mg by mouth 2 (two) times daily with meals.            While in the hospital, will manage blood pressure as follows; Continue home antihypertensive regimen    Will utilize p.r.n. blood pressure medication only if patient's blood pressure greater than 180/110 and she develops symptoms such as worsening chest pain or shortness of breath.      DISCHARGE PLANNING:   Repeat BMP in the morning, continue replacing potassium   Anticoagulation I will defer to the interventional neurologist    Signing Physician:  Adriel Hartmann MD

## 2023-12-09 NOTE — PLAN OF CARE
Problem: Adult Inpatient Plan of Care  Goal: Plan of Care Review  Outcome: Ongoing, Progressing  Flowsheets (Taken 12/8/2023 7441)  Plan of Care Reviewed With: patient  Goal: Patient-Specific Goal (Individualized)  Outcome: Ongoing, Progressing  Goal: Absence of Hospital-Acquired Illness or Injury  Outcome: Ongoing, Progressing  Goal: Optimal Comfort and Wellbeing  Outcome: Ongoing, Progressing  Goal: Readiness for Transition of Care  Outcome: Ongoing, Progressing     Problem: Infection  Goal: Absence of Infection Signs and Symptoms  Outcome: Ongoing, Progressing     Problem: Adjustment to Illness (Stroke, Ischemic/Transient Ischemic Attack)  Goal: Optimal Coping  Outcome: Ongoing, Progressing     Problem: Bowel Elimination Impaired (Stroke, Ischemic/Transient Ischemic Attack)  Goal: Effective Bowel Elimination  Outcome: Ongoing, Progressing     Problem: Cerebral Tissue Perfusion (Stroke, Ischemic/Transient Ischemic Attack)  Goal: Optimal Cerebral Tissue Perfusion  Outcome: Ongoing, Progressing     Problem: Cognitive Impairment (Stroke, Ischemic/Transient Ischemic Attack)  Goal: Optimal Cognitive Function  Outcome: Ongoing, Progressing     Problem: Communication Impairment (Stroke, Ischemic/Transient Ischemic Attack)  Goal: Improved Communication Skills  Outcome: Ongoing, Progressing     Problem: Functional Ability Impaired (Stroke, Ischemic/Transient Ischemic Attack)  Goal: Optimal Functional Ability  Outcome: Ongoing, Progressing     Problem: Respiratory Compromise (Stroke, Ischemic/Transient Ischemic Attack)  Goal: Effective Oxygenation and Ventilation  Outcome: Ongoing, Progressing     Problem: Sensorimotor Impairment (Stroke, Ischemic/Transient Ischemic Attack)  Goal: Improved Sensorimotor Function  Outcome: Ongoing, Progressing     Problem: Swallowing Impairment (Stroke, Ischemic/Transient Ischemic Attack)  Goal: Optimal Eating and Swallowing without Aspiration  Outcome: Ongoing, Progressing     Problem:  Urinary Elimination Impaired (Stroke, Ischemic/Transient Ischemic Attack)  Goal: Effective Urinary Elimination  Outcome: Ongoing, Progressing     Problem: Fall Injury Risk  Goal: Absence of Fall and Fall-Related Injury  Outcome: Ongoing, Progressing     Problem: Skin Injury Risk Increased  Goal: Skin Health and Integrity  Outcome: Ongoing, Progressing

## 2023-12-09 NOTE — PROGRESS NOTES
Stroke progress note:    Subjective:   No acute events overnight. Speech improved. MRI pending.     Exam:  Vitals:    12/09/23 1141   BP:    Pulse: 60   Resp: 15   Temp: 98.7 °F (37.1 °C)       Alert & oriented x 3  EOMI, PERRLA, visual field intact in all 4 quadrants  Face symmetric, speech mild aphasia but overall improved today. Able to name 4/5 objects.   Tongue midline, facial sensation equal bilaterally  Strength 5/5 in bilateral upper and lower extremities   Sensation intact and equal bilaterally  Right groin/wrist stable without hematoma  Distal pulse intact. Skin warm.       Imaging: no new imaging available.     A/P:   Given the right brachial thrombus and left MCA occlusion, there is concern for a cardioembolic etiology for her stroke. Her echo was inconclusive. She may benefit from DESIREE and loop recorder. MRI brain pending at this time. Echo concerning for stress induced cardiomyopathy per cardiology.      Plan:  - MRI brain/CTH today  - ok to start aspirin 325 mg & DVT prophylaxis if MRI/CT is negative for hemorrhage.    - lipitor 40 mg  - PT/OT/speech therapy  - transfer to floor   - consult cardiology for possible DESIREE/loop recorder vs outpatient follow up.  - f/u with Dr Mcqueen in clinic in 6-8 weeks      Fox Mcqueen MD  Vascular and Interventional Neurology

## 2023-12-09 NOTE — PT/OT/SLP EVAL
Occupational Therapy  Evaluation and Discharge    Name: Manisha Kirk  MRN: [id number]  Recommendations:     Discharge therapy intensity: No Therapy Indicated  Discharge Equipment Recommendations:  none  Barriers to discharge:  None    Assessment:     Manisha Kirk is a 78 y.o. female with a medical diagnosis of  acute occlusion of brachial artery due to thrombosis s/p RUE thrombectomy. Post op, Pt developed R weakness and aphasia and was diagnosed with L MCA stroke. She underwent intra-arterial TPA and weakness has resolved. Mild word finding difficulties persist today. At this time, patient is functioning at their prior level of function and does not require further acute OT services.    Rehab Prognosis: Good; patient would benefit from acute skilled OT services to address these deficits and reach maximum level of function.       Plan:     Patient to be seen   to address the above listed problems via    Plan of Care Expires:    Plan of Care Reviewed with: patient, family    Subjective     Chief Complaint: none stated  Patient/Family Comments/goals: to go home    Occupational Profile:  Living Environment: lives alone in [facility], no [place]; walk in shower  Previous level of function: IND  Roles and Routines: mother, grandmother  Equipment Used at Home: shower chair  Assistance upon Discharge: family    Pain/Comfort:  Pain Rating 1: 0/10    Patients cultural, spiritual, Congregational conflicts given the current situation: no    Objective:     OT communicated with NSG prior to session.      Patient was found HOB elevated with blood pressure cuff, peripheral IV, telemetry, SCD, pulse ox (continuous) upon OT entry to room.    General Precautions: Standard, fall (-180)  Orthopedic Precautions: N/A  Braces: N/A  Room air  Vital Signs: 113/63 59 bpm 95%    Bed Mobility:    Patient completed Scooting/Bridging with independence  Patient completed Supine to Sit with independence  Patient completed Sit to Supine with  independence    Functional Mobility/Transfers:  Patient completed Sit <> Stand Transfer with independence  with  no assistive device   Patient completed Toilet Transfer Step Transfer technique with independence with  no AD  Functional Mobility: ambulating with no AD to ICU toilet, no LOB    Activities of Daily Living:  Lower Body Dressing: independence to don socks  Toileting: independence to perform anterior hygiene after +void    Functional Cognition:  Orientation: oriented to Person, Place, Time, and Situation  Communication: aphasia    Visual Perceptual Skills:  Intact    Upper Extremity Function:  Right Upper Extremity:   WFL; ROM, MMT, sensory, and coordination    Left Upper Extremity:  WFL; ROM, MMT, sensory, and coordination    Balance:   Intact    Therapeutic Positioning  Risk for acquired pressure injuries is decreased due to intact sensation, continence, and ability to mobilize independently .    OT interventions performed during the course of today's session:   Education was provided on benefits of and recommendations for therapeutic positioning  Therapeutic positioning was provided at the conclusion of session to offload all bony prominences for the prevention and/or reduction of pressure injuries    Skin assessment: full body skin assessment was performed    Findings: no redness or breakdown noted    OT recommendations for therapeutic positioning throughout hospitalization:   Follow [facility] Pressure Injury Prevention Protocol      Patient Education:  Patient and family provided with verbal education education regarding OT role/goals/POC, fall prevention, safety awareness, Discharge/DME recommendations, and pressure ulcer prevention.  Understanding was verbalized.     Patient left HOB elevated with all lines intact, call button in reach, NSG notified, and family present    GOALS:   Multidisciplinary Problems       Occupational Therapy Goals       Not on file                    History:     Past Medical  History:   Diagnosis Date    Autoimmune thyroiditis     HLD (hyperlipidemia)     Hypertension          Past Surgical History:   Procedure Laterality Date    CATARACT EXTRACTION      CHOLECYSTECTOMY      SURGICAL REMOVAL OF BONE SPUR      TONSILLECTOMY      TOTAL ABDOMINAL HYSTERECTOMY         Time Tracking:     OT Date of Treatment:    OT Start Time: 1003  OT Stop Time: 1027  OT Total Time (min): 24 min    Billable Minutes:Evaluation High    12/9/2023

## 2023-12-09 NOTE — OP NOTE
Operative Note    Patient Name: Manisha ARAIZA Kirk  Age: 78 y.o.  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 Risks and benefits were discussed with patient.     Findings:  Fresh chronic clot in brachial, radial and ulnar artery      Procedure in detail:  Patient was taken to the operating room and placed supine on the operating room table.  The right upper extremity was prepped and draped in the usual sterile fashion.  Intravenous was given to the patient.  A surgical time-out was then performed per surgical room protocol.    Patient was on a heparin drip.  At this time an incision was made overlying the brachial artery in a vertical fashion.  The subcuticular tissue was then deepened down.  The fascial layer was excised.  At this time a crossing vein overlying the brachial artery was suture ligated and transected.  The brachial artery was circumferentially dissected and a vessel loop was placed for proximal control.  The brachial artery was circumferentially dissected and controlled.  The ulnar artery was dissected and controlled.  At this time 5000 units of heparin was given to the patient.  A transverse arteriotomy was made on the brachial artery proximal to the bifurcation.  A 2 Glenny balloon was advanced down into the ulnar artery and embolectomy was performed.  There were fresh clot was removed.  Another passes of the Glenny balloon down the ulnar artery a little bit further remove  the plaque and there was good backbleeding.  At this time the 2 Glenny balloon was advanced down the ulnar artery and a plug was removed with good backbleeding.  A 3 Glenny balloon was advanced up into the brachial artery and embolectomy was performed.  A fresh and chronic clot with meniscus was removed and there was strong pulsatile blood flow from the brachial artery.  At this time the arteriotomy was closed with 6 0 Prolene interruptedly, 25mg of protamine was given to the patient.  Hemostasis was obtained.  A sterile dressing was applied.  Patient has a palpable radial and ulnar pulse at the end of the procedure.    At the end of the procedure, all counts were corrected.     The patient tolerated the procedure well and was brought to the recovery area in stable condition.     Patient has palpable radial pulse at the end of the procedure.      Jh ALFONSO, was present and scrubbed for the entirety of the procedure.      Jh Beckford  12/8/2023

## 2023-12-09 NOTE — PROGRESS NOTES
Vascular Surgery Progress Note:    Patient underwent right brachial embolectomy. Overnight, code FAST was called and underwent intracranial TPA.     Currently in ICU back to baseline with some numbness in the right arm.     Patient has good radial pulse in the right hand.     No pain.     MRI is planned to ensure no hemorrhage.     Will need to work up for source of embolic events, likely cardiac.     Will need anticoagulation once cleared by Neurology.     Jh Beckford MD

## 2023-12-09 NOTE — PT/OT/SLP EVAL
Physical Therapy Evaluation and Discharge Note    Patient Name:  Manisha Kirk   MRN:  [id number]    Recommendations:     Discharge therapy intensity: No Therapy Indicated (However, needs outpt SPEECH therapy.)   Discharge Equipment Recommendations: none   Barriers to discharge: None    Assessment:     Manisha Kirk is a 78 y.o. 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 Post op, pt developed R weakness and aphasia and was diagnosed with L MCA stroke. She underwent intra-arterial TPA and weakness has resolved. Mild word finding difficulties persist today. Pt is WNL with strength, balance, and mobilty .  At this time, patient is functioning at their prior level of function and does not require further acute PT services. However, she would benefit from further outpt speech therapy. Pt is a low fall risk.     Recent Surgery: Procedure(s) (LRB):  EMBOLECTOMY OR THROMBECTOMY, BLOOD VESSEL, UPPER EXTREMITY (Right) 2 Days Post-Op    Plan:     During this hospitalization, patient does not require further acute PT services.  Please re-consult if situation changes.      Subjective     Chief Complaint: difficulty finding the right word  Patient/Family Comments/goals: to go home  Pain/Comfort:  Pain Rating 1: 0/10    Patients cultural, spiritual, Restorationist conflicts given the current situation: no    Living Environment:  Lives alone, drives, no steps to enter.   Prior to admission, patients level of function was independent.  Equipment used at home: none.  DME owned (not currently used): none.  Upon discharge, patient will have assistance from dtr.    Objective:     Communicated with nurse prior to session.  Patient found supine with telemetry, pulse ox (continuous), blood pressure cuff, PureWick upon PT entry to room.    General Precautions: Standard, aphasia, aspiration    Orthopedic Precautions:    Braces: N/A  Respiratory Status: Room  air  Blood Pressure: 143/65, 95%, 63 HR    Exams:  Cognitive Exam:  Patient is oriented to Person, Place, Time, and Situation  Gross Motor Coordination:  WFL  Sensation:    -       Intact  RLE ROM: WNL  RLE Strength: WNL  LLE ROM: WNL  LLE Strength: WNL    TINETTI BALANCE ASSESSMENT TOOL    Graham ME, Ronald TF, Samira R, Fall Risk Index for elderly patients based on number of chronic disabilities.Am J Med [id number]      BALANCE SECTION  Patient is seated in hard, armless chair;    1.Sitting Balance:   Steady; safe = 1  2.Rises from chair :  Able, without using arms = 2  3.Attempts to arise :  Able to arise, 1 attempt = 2  4.Immediate standing Balance (first 5 seconds) : Steady without walker or other support = 2  5.Standing balance: Narrow stance without support = 2  6.Nudged : Steady = 2  7.Eyes closed: Steady = 1  8.Turning 360 degrees:  Steady = 1  9.Sitting Down : Safe, smooth motion = 2    Balance Score:  16/16    GAIT SECTION  Patient stands with therapist, walks across room (+/- aids), first at usual pace, then at rapid pace.    10.Initiation of Gait (Immediately after told to go.): No hesitancy = 1  11.Step length and height :  Step through R=1 and Step through L=1  12.Foot Clearance :  L foot clears floor=1 R foot clears floor=1  13.Step symmetry: Right & Left step length appear equal = 1  14.Step continuity : Steps appear continuous = 1  15.Path: Straight without walking aid = 2  16.Trunk : No sway, no flexion, no use of arms, and no use of walking aid = 2  17.Walking Time: Heels apart = 0    Gait Score: 11/12  Balance score:16/16  Total Score=Balance + Gait Score: 27/28     Risk Indicators:    Tinetti Tool Score   Risk of Falls   ?18    High   19-23   Moderate   ?24   Low      Functional Mobility:  Bed Mobility:     Supine to Sit: independence  Sit to Supine: independence  Transfers:     Sit to Stand:  independence with no AD  Gait: 120ft with no AD, independently.     AM-PAC 6 CLICK  MOBILITY  Total Score:24       Treatment and Education:    Patient provided with verbal education education regarding fall prevention and discharge/DME recommendations.  Understanding was verbalized.     Patient left supine with all lines intact, call button in reach, and nurse notified.    GOALS:   Multidisciplinary Problems       Physical Therapy Goals       Not on file                    History:     Past Medical History:   Diagnosis Date    Autoimmune thyroiditis     HLD (hyperlipidemia)     Hypertension        Past Surgical History:   Procedure Laterality Date    CATARACT EXTRACTION      CHOLECYSTECTOMY      SURGICAL REMOVAL OF BONE SPUR      TONSILLECTOMY      TOTAL ABDOMINAL HYSTERECTOMY         Time Tracking:     PT Received On: 12/09/23  PT Start Time: 0848     PT Stop Time: 0906  PT Total Time (min): 18 min     Billable Minutes: Evaluation 18      12/09/2023

## 2023-12-10 LAB
ALBUMIN SERPL-MCNC: 2.6 G/DL (ref 3.4–4.8)
ALBUMIN/GLOB SERPL: 0.9 RATIO (ref 1.1–2)
ALP SERPL-CCNC: 67 UNIT/L (ref 40–150)
ALT SERPL-CCNC: 9 UNIT/L (ref 0–55)
AST SERPL-CCNC: 15 UNIT/L (ref 5–34)
BASOPHILS # BLD AUTO: 0.04 X10(3)/MCL
BASOPHILS NFR BLD AUTO: 0.4 %
BILIRUB SERPL-MCNC: 0.6 MG/DL
BUN SERPL-MCNC: 15.1 MG/DL (ref 9.8–20.1)
CALCIUM SERPL-MCNC: 7.5 MG/DL (ref 8.4–10.2)
CHLORIDE SERPL-SCNC: 114 MMOL/L (ref 98–107)
CO2 SERPL-SCNC: 20 MMOL/L (ref 23–31)
CREAT SERPL-MCNC: 0.68 MG/DL (ref 0.55–1.02)
EOSINOPHIL # BLD AUTO: 0.27 X10(3)/MCL (ref 0–0.9)
EOSINOPHIL NFR BLD AUTO: 2.9 %
ERYTHROCYTE [DISTWIDTH] IN BLOOD BY AUTOMATED COUNT: 12.8 % (ref 11.5–17)
GFR SERPLBLD CREATININE-BSD FMLA CKD-EPI: >60 MLS/MIN/1.73/M2
GLOBULIN SER-MCNC: 2.9 GM/DL (ref 2.4–3.5)
GLUCOSE SERPL-MCNC: 112 MG/DL (ref 82–115)
HCT VFR BLD AUTO: 34.3 % (ref 37–47)
HGB BLD-MCNC: 11.3 G/DL (ref 12–16)
IMM GRANULOCYTES # BLD AUTO: 0.02 X10(3)/MCL (ref 0–0.04)
IMM GRANULOCYTES NFR BLD AUTO: 0.2 %
LYMPHOCYTES # BLD AUTO: 2.54 X10(3)/MCL (ref 0.6–4.6)
LYMPHOCYTES NFR BLD AUTO: 27.2 %
MCH RBC QN AUTO: 31.1 PG (ref 27–31)
MCHC RBC AUTO-ENTMCNC: 32.9 G/DL (ref 33–36)
MCV RBC AUTO: 94.5 FL (ref 80–94)
MONOCYTES # BLD AUTO: 0.99 X10(3)/MCL (ref 0.1–1.3)
MONOCYTES NFR BLD AUTO: 10.6 %
NEUTROPHILS # BLD AUTO: 5.47 X10(3)/MCL (ref 2.1–9.2)
NEUTROPHILS NFR BLD AUTO: 58.7 %
NRBC BLD AUTO-RTO: 0 %
PLATELET # BLD AUTO: 187 X10(3)/MCL (ref 130–400)
PMV BLD AUTO: 10.6 FL (ref 7.4–10.4)
POTASSIUM SERPL-SCNC: 3.6 MMOL/L (ref 3.5–5.1)
PROT SERPL-MCNC: 5.5 GM/DL (ref 5.8–7.6)
RBC # BLD AUTO: 3.63 X10(6)/MCL (ref 4.2–5.4)
SODIUM SERPL-SCNC: 139 MMOL/L (ref 136–145)
WBC # SPEC AUTO: 9.33 X10(3)/MCL (ref 4.5–11.5)

## 2023-12-10 PROCEDURE — 99233 SBSQ HOSP IP/OBS HIGH 50: CPT | Mod: 95,,, | Performed by: INTERNAL MEDICINE

## 2023-12-10 PROCEDURE — 63600175 PHARM REV CODE 636 W HCPCS

## 2023-12-10 PROCEDURE — 11000001 HC ACUTE MED/SURG PRIVATE ROOM

## 2023-12-10 PROCEDURE — 99233 PR SUBSEQUENT HOSPITAL CARE,LEVL III: ICD-10-PCS | Mod: 95,,, | Performed by: INTERNAL MEDICINE

## 2023-12-10 PROCEDURE — 80053 COMPREHEN METABOLIC PANEL: CPT

## 2023-12-10 PROCEDURE — 99233 PR SUBSEQUENT HOSPITAL CARE,LEVL III: ICD-10-PCS | Mod: ,,, | Performed by: PSYCHIATRY & NEUROLOGY

## 2023-12-10 PROCEDURE — 99233 SBSQ HOSP IP/OBS HIGH 50: CPT | Mod: ,,, | Performed by: PSYCHIATRY & NEUROLOGY

## 2023-12-10 PROCEDURE — 25000003 PHARM REV CODE 250: Performed by: SURGERY

## 2023-12-10 PROCEDURE — 25000003 PHARM REV CODE 250: Performed by: PSYCHIATRY & NEUROLOGY

## 2023-12-10 PROCEDURE — 85025 COMPLETE CBC W/AUTO DIFF WBC: CPT | Performed by: SURGERY

## 2023-12-10 PROCEDURE — 25000003 PHARM REV CODE 250: Performed by: INTERNAL MEDICINE

## 2023-12-10 RX ORDER — POLYETHYLENE GLYCOL 3350 17 G/17G
17 POWDER, FOR SOLUTION ORAL DAILY
Status: DISCONTINUED | OUTPATIENT
Start: 2023-12-10 | End: 2023-12-11 | Stop reason: HOSPADM

## 2023-12-10 RX ADMIN — HEPARIN SODIUM 5000 UNITS: 5000 INJECTION INTRAVENOUS; SUBCUTANEOUS at 06:12

## 2023-12-10 RX ADMIN — HEPARIN SODIUM 5000 UNITS: 5000 INJECTION INTRAVENOUS; SUBCUTANEOUS at 09:12

## 2023-12-10 RX ADMIN — HEPARIN SODIUM 5000 UNITS: 5000 INJECTION INTRAVENOUS; SUBCUTANEOUS at 01:12

## 2023-12-10 RX ADMIN — FAMOTIDINE 20 MG: 20 TABLET ORAL at 07:12

## 2023-12-10 RX ADMIN — CARVEDILOL 6.25 MG: 3.12 TABLET, FILM COATED ORAL at 07:12

## 2023-12-10 RX ADMIN — SODIUM CHLORIDE: 9 INJECTION, SOLUTION INTRAVENOUS at 07:12

## 2023-12-10 RX ADMIN — POLYETHYLENE GLYCOL 3350 17 G: 17 POWDER, FOR SOLUTION ORAL at 04:12

## 2023-12-10 RX ADMIN — MUPIROCIN: 20 OINTMENT TOPICAL at 07:12

## 2023-12-10 RX ADMIN — ASPIRIN 325 MG: 325 TABLET, COATED ORAL at 07:12

## 2023-12-10 RX ADMIN — CARVEDILOL 6.25 MG: 3.12 TABLET, FILM COATED ORAL at 04:12

## 2023-12-10 RX ADMIN — ATORVASTATIN CALCIUM 40 MG: 40 TABLET, FILM COATED ORAL at 07:12

## 2023-12-10 RX ADMIN — MUPIROCIN: 20 OINTMENT TOPICAL at 09:12

## 2023-12-10 RX ADMIN — DOCUSATE SODIUM 50 MG: 50 CAPSULE, LIQUID FILLED ORAL at 04:12

## 2023-12-10 NOTE — SUBJECTIVE & OBJECTIVE
Subjective:     Interval History: Sitting up in bed.  Family at bedside.  Reports plans for DESIREE and ILR tomorrow.  No new neurological issues overnight.      Current Facility-Administered Medications   Medication Dose Route Frequency Provider Last Rate Last Admin    0.9%  NaCl infusion   Intravenous Continuous Fox Mcqueen  mL/hr at 12/10/23 0757 New Bag at 12/10/23 0757    aspirin EC tablet 325 mg  325 mg Oral Daily Fox Mcqueen MD   325 mg at 12/10/23 0743    atorvastatin tablet 40 mg  40 mg Oral Daily Fox Mcqueen MD   40 mg at 12/10/23 0743    carvediloL tablet 6.25 mg  6.25 mg Oral BID WM Jh Beckford MD   6.25 mg at 12/10/23 0743    docusate sodium capsule 50 mg  50 mg Oral Daily Adriel Ambrose MD   50 mg at 12/10/23 1605    famotidine tablet 20 mg  20 mg Oral Daily Jh Beckford MD   20 mg at 12/10/23 0743    heparin (porcine) injection 5,000 Units  5,000 Units Subcutaneous Q8H Mike Jaramillo DO   5,000 Units at 12/10/23 1310    hydrALAZINE injection 10 mg  10 mg Intravenous Q2H PRN Mike Jaramillo DO        labetaloL injection 10 mg  10 mg Intravenous Q2H PRN Mike Jaramillo DO        levothyroxine tablet 25 mcg  25 mcg Oral Before breakfast Jh Beckford MD        LORazepam (ATIVAN) injection 2 mg  2 mg Intravenous Once PRN Juani Hills MD        melatonin tablet 6 mg  6 mg Oral Nightly PRN Jh Beckford MD        morphine injection 2 mg  2 mg Intravenous Q4H PRN Jh Beckford MD        morphine injection 4 mg  4 mg Intravenous Q4H PRN Jh Beckford MD        mupirocin 2 % ointment   Nasal BID Asim Troy MD   Given at 12/10/23 0743    ondansetron injection 4 mg  4 mg Intravenous Q8H PRN Jh Beckford MD        polyethylene glycol packet 17 g  17 g Oral Daily Adriel Ambrose MD   17 g at 12/10/23 1606    sodium chloride 0.9% flush 10 mL  10 mL Intravenous PRN Jh Beckford MD        sodium chloride 0.9% flush 10 mL  10 mL Intravenous PRN Raheem Do MD        sodium  chloride 0.9% flush 10 mL  10 mL Intravenous PRN Fox Mcqueen MD        sodium chloride 0.9% flush 10 mL  10 mL Intravenous PRN Mike Jaramillo DO         Facility-Administered Medications Ordered in Other Encounters   Medication Dose Route Frequency Provider Last Rate Last Admin    electrolyte-A infusion   Intravenous Continuous PRN Arnav Hand CRNA   New Bag at 12/07/23 2153    ePHEDrine sulfate   Intravenous PRN Arnav Hand, CRNA   10 mg at 12/07/23 2151    fentaNYL injection   Intravenous PRN Arnav Hand, CRNA   50 mcg at 12/07/23 2116    glycopyrrolate injection   Intravenous PRN Arnav Hand, CRNA   0.2 mg at 12/07/23 2105    heparin (porcine) injection   Intravenous PRN Arnav Hand CRNA   5,000 Units at 12/07/23 2128    LIDOcaine (PF) 20 mg/mL (2%) injection   Intravenous PRN Arnav Hand CRNA   40 mg at 12/07/23 2057    ondansetron HCl (PF) injection   Intravenous PRN Arnav Hand CRNA   4 mg at 12/07/23 2105    phenylephrine HCl in 0.9% NaCl 1 mg/10 mL (100 mcg/mL) syringe   Intravenous PRN Arnav Hand CRNA   200 mcg at 12/07/23 2108    propofol (DIPRIVAN) 10 mg/mL infusion   Intravenous PRN Arnav Hand CRNA   120 mg at 12/07/23 2057    protamine injection   Intravenous PRN Arnav Hand, CRNA   30 mg at 12/07/23 2217    rocuronium injection   Intravenous PRN Arnav Hand, CRNA   50 mg at 12/07/23 2057    sugammadex (BRIDION) 100 mg/mL injection   Intravenous PRN Arnav Hand, CRNA   200 mg at 12/07/23 2234       Review of Systems  Objective:     Vital Signs (Most Recent):  Temp: 98.6 °F (37 °C) (12/10/23 1200)  Pulse: (!) 54 (12/10/23 1517)  Resp: 17 (12/10/23 1517)  BP: 131/60 (12/10/23 1317)  SpO2: 97 % (12/10/23 1517) Vital Signs (24h Range):  Temp:  [97.9 °F (36.6 °C)-98.9 °F (37.2 °C)] 98.6 °F (37 °C)  Pulse:  [50-64] 54  Resp:  [10-23] 17  SpO2:  [92 %-99 %] 97 %  BP: ()/(51-68) 131/60     Weight: 73.5 kg (162 lb)  Body mass index is 26.96 kg/m².     Physical  Exam  Vitals and nursing note reviewed.   Constitutional:       General: She is not in acute distress.     Appearance: She is not ill-appearing or toxic-appearing.   HENT:      Head: Normocephalic.   Eyes:      General: No visual field deficit.     Pupils: Pupils are equal, round, and reactive to light.   Pulmonary:      Effort: Pulmonary effort is normal.   Musculoskeletal:         General: Normal range of motion.      Cervical back: Normal range of motion.      Right lower leg: No edema.      Left lower leg: No edema.   Skin:     General: Skin is warm and dry.      Capillary Refill: Capillary refill takes less than 2 seconds.   Neurological:      Mental Status: She is alert.      Cranial Nerves: No dysarthria or facial asymmetry.      Sensory: No sensory deficit.      Motor: No weakness, tremor, abnormal muscle tone or pronator drift.      Coordination: Coordination normal. Finger-Nose-Finger Test normal.      Comments: Occasional word finding difficulty (family reports improvement)        Significant Labs: BMP:   Recent Labs   Lab 12/09/23  0120 12/10/23  0213    139   K 3.1* 3.6   CO2 22* 20*   BUN 9.7* 15.1   CREATININE 0.74 0.68   CALCIUM 7.2* 7.5*   MG 1.90  --      CBC:   Recent Labs   Lab 12/09/23  0119 12/10/23  0213   WBC 9.40 9.33   HGB 12.4 11.3*   HCT 35.6* 34.3*    187       Significant Imaging: I have reviewed all pertinent imaging results/findings within the past 24 hours.

## 2023-12-10 NOTE — ASSESSMENT & PLAN NOTE
-presented for right hand numbness and pallor on 12/7, underwent a right brachial embolectomy with Dr. Beckford  -developed aphasia and right sided weakness on 12/8 at 2:00 am, found to have a left MCA occlusion  -intervention: diagnostic angio with spontaneous recanalization of left MCA with distal emboli s/p  intra-arterial tPA   -etiology: TBD      Stroke workup:  -CT head: negative  -CTA h/n: L MCA occlusion  -repeat CTh:  No acute intracranial findings identified  -ECHO:    Left Ventricle: The left ventricle is mildly dilated. Normal wall thickness. There is severe concentric hypertrophy. There is mildly reduced systolic function. Apical hypokinesis Grade II diastolic dysfunction.    Right Ventricle: Normal right ventricular cavity size. Systolic function is normal. TAPSE is 1.61 cm.    Tricuspid Valve: There is mild regurgitation.    Pulmonary Artery: No pulmonary hypertension.    The upper mid segments and apical segments of the LV show severe hypertrophy. Simpsons cannot be performed due to the apex being closed off.   -LDL: 121  -A1c: 5.7  -TSH: 3.139      Plan  -continue ASA 325mg daily  -continue Atorvastatin 40mg daily  -continue ST ... PT/OT signed off  -plans for DESIREE/ILR tomorrow  -follow up with Dr Mcqueen in 6-8 weeks    Further recommendations to follow by MD.

## 2023-12-10 NOTE — PLAN OF CARE
Problem: Adult Inpatient Plan of Care  Goal: Plan of Care Review  Outcome: Ongoing, Progressing  Flowsheets (Taken 12/9/2023 3714)  Plan of Care Reviewed With: patient  Goal: Patient-Specific Goal (Individualized)  Outcome: Ongoing, Progressing  Goal: Absence of Hospital-Acquired Illness or Injury  Outcome: Ongoing, Progressing  Goal: Optimal Comfort and Wellbeing  Outcome: Ongoing, Progressing  Goal: Readiness for Transition of Care  Outcome: Ongoing, Progressing     Problem: Infection  Goal: Absence of Infection Signs and Symptoms  Outcome: Ongoing, Progressing     Problem: Adjustment to Illness (Stroke, Ischemic/Transient Ischemic Attack)  Goal: Optimal Coping  Outcome: Ongoing, Progressing     Problem: Bowel Elimination Impaired (Stroke, Ischemic/Transient Ischemic Attack)  Goal: Effective Bowel Elimination  Outcome: Ongoing, Progressing     Problem: Cerebral Tissue Perfusion (Stroke, Ischemic/Transient Ischemic Attack)  Goal: Optimal Cerebral Tissue Perfusion  Outcome: Ongoing, Progressing     Problem: Cognitive Impairment (Stroke, Ischemic/Transient Ischemic Attack)  Goal: Optimal Cognitive Function  Outcome: Ongoing, Progressing     Problem: Communication Impairment (Stroke, Ischemic/Transient Ischemic Attack)  Goal: Improved Communication Skills  Outcome: Ongoing, Progressing     Problem: Functional Ability Impaired (Stroke, Ischemic/Transient Ischemic Attack)  Goal: Optimal Functional Ability  Outcome: Ongoing, Progressing     Problem: Respiratory Compromise (Stroke, Ischemic/Transient Ischemic Attack)  Goal: Effective Oxygenation and Ventilation  Outcome: Ongoing, Progressing     Problem: Sensorimotor Impairment (Stroke, Ischemic/Transient Ischemic Attack)  Goal: Improved Sensorimotor Function  Outcome: Ongoing, Progressing     Problem: Swallowing Impairment (Stroke, Ischemic/Transient Ischemic Attack)  Goal: Optimal Eating and Swallowing without Aspiration  Outcome: Ongoing, Progressing     Problem:  Urinary Elimination Impaired (Stroke, Ischemic/Transient Ischemic Attack)  Goal: Effective Urinary Elimination  Outcome: Ongoing, Progressing     Problem: Fall Injury Risk  Goal: Absence of Fall and Fall-Related Injury  Outcome: Ongoing, Progressing     Problem: Skin Injury Risk Increased  Goal: Skin Health and Integrity  Outcome: Ongoing, Progressing

## 2023-12-10 NOTE — PT/OT/SLP PROGRESS
[facility]  Speech Language Pathology Department  Diet Tolerance Follow-up    Patient Name:  Manisha Kirk   MRN:  [id number]  Admitting Diagnosis: CVA    Recommendations:     General recommendations:  standardized cognitive testing  Diet texture/consistency recommendations:  Regular solids (IDDSI 7) and thin liquids (IDDSI 0)  Medications: per patient preference  Swallow strategies/precautions: small bites/sips and slow rate  Precautions: Standard, aspiration    Diet tolerance:     Nursing reports no difficulty regarding diet tolerance.    Patient Education:     No learner present/available.    Plan:     SLP Follow-Up:  Yes    Patient to be seen:  5 x/week   Plan of Care expires:  12/22/23  Plan of Care reviewed with:  patient, family         12/10/2023

## 2023-12-10 NOTE — PROGRESS NOTES
Progress Note  Hospital Medicine    Patient Name: Manisha Kirk  YOB: 1945    Admit Date: 12/7/2023                     LOS: 3    SUBJECTIVE:     Reason for Admission:  Acute occlusion of brachial artery due to thrombosis  See H&P for detailed presentating history and ROS.      Interval history:  Uneventful night, patient is sitting in the recliner with no acute complaints except constipation will go ahead and resume some MiraLax and stool softener  According to the nurses while doing rounds the interventional neurologist who like to do a transesophageal echo and loop event recorder, will defer discharge to Dr. Piter Nolan  afterwards      OBJECTIVE:     Vital Signs Range (Last 24H):  Temp:  [97.9 °F (36.6 °C)-98.9 °F (37.2 °C)]   Pulse:  [56-72]   Resp:  [16-23]   BP: ()/(51-68)   SpO2:  [92 %-97 %] Body mass index is 26.96 kg/m².  Wt Readings from Last 3 Encounters:   12/07/23 1845 73.5 kg (162 lb)   12/07/23 1406 74.4 kg (164 lb)   09/06/23 1345 74.8 kg (165 lb)       I & O (Last 24H):  Intake/Output Summary (Last 24 hours) at 12/10/2023 1332  Last data filed at 12/10/2023 0600  Gross per 24 hour   Intake 1320 ml   Output --   Net 1320 ml       Physical Exam:  Patient alert in good spirit in the company of the son and the grandson in no obvious distress  HEENT within normal limits facial muscles are symmetric speech is clear neck is supple   Heart regular rhythm  Lungs are clear bilateral no wheezing rales or rhonchi   Abdomen benign bowel sounds are positive nontender  Extremities no ischemic changes distal pulses are positive no clubbing cyanosis or edema  Neurological exam intact    Diagnostic Results:  Lab Results   Component Value Date    WBC 9.33 12/10/2023    HGB 11.3 (L) 12/10/2023    HCT 34.3 (L) 12/10/2023    MCV 94.5 (H) 12/10/2023     12/10/2023     Recent Labs   Lab 12/10/23  0213      K 3.6   CO2 20*   BUN 15.1   CREATININE 0.68   CALCIUM 7.5*     Lab  Results   Component Value Date    INR 1.1 12/08/2023    INR 0.9 12/07/2023    PROTIME 14.1 12/08/2023    PROTIME 12.2 (L) 12/07/2023     Lab Results   Component Value Date    HGBA1C 5.7 12/08/2023     Recent Labs     12/08/23  0158   POCTGLUCOSE 116*       ASSESSMENT/PLAN:     Active Hospital Problems    Diagnosis  POA    Acute cerebrovascular accident (CVA) due to occlusion of left middle cerebral artery [I63.512]  No    Hypertension [I10]  Yes      Resolved Hospital Problems    Diagnosis Date Resolved POA    *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 Beckford  -developed aphasia and right sided weakness on 12/8 at 2:00 am, found to have a left MCA occlusion  -intervention: diagnostic angio with spontaneous recanalization of left MCA with distal emboli s/p  intra-arterial tPA   -etiology: TBD      Stroke workup  CT head: negative  CTA and and neck: L MCA occlusion        Plan  -Continue stroke workup  -therapy evals tomorrow (PT/OT/ST), symptom onset 2:00 am on 12/8  -HOB flat unless otherwise directed by Dr. Mcqueen  -Q1 hour neuro checks  -MRI brain today, will need repeat CT head (within 24 hours, 2:00 am on 12/9)  --180  -Hold antiplatelets/anticoagulation until repeat imaging confirms no hemorrhage        Spontaneous left MCA stroke after thrombectomy right distal brachial artery earlier that day  Echocardiogram shows severe hypertrophy unable to calculate EF no no shunt low PFO.  She is recovering symptomatically.  Etiology unclear as to why she developed thrombosis.    Hypertension  Chronic, controlled. Latest blood pressure and vitals reviewed-     Temp:  [97.5 °F (36.4 °C)-98.1 °F (36.7 °C)]   Pulse:  [59-97]   Resp:  [12-26]   BP: (103-176)/()    SpO2:  [91 %-100 %] .   Home meds for hypertension were reviewed and noted below.   Hypertension Medications               amLODIPine (NORVASC) 5 MG tablet Take 5 mg by mouth once daily.    carvediloL (COREG) 6.25 MG tablet Take 6.25 mg by mouth 2 (two) times daily with meals.            While in the hospital, will manage blood pressure as follows; Continue home antihypertensive regimen    Will utilize p.r.n. blood pressure medication only if patient's blood pressure greater than 180/110 and she develops symptoms such as worsening chest pain or shortness of breath.      DISCHARGE PLANNING:   Transesophageal echo as they recommended as well as the loop event, otherwise physical therapy encouraged out of bed activity, and will address the constipation    Signing Physician:  Adriel Hartmann MD

## 2023-12-10 NOTE — NURSING
Nurses Note -- 4 Eyes      12/9/2023   10:49 PM      Skin assessed during: Daily Assessment      [x] No Altered Skin Integrity Present    [x]Prevention Measures Documented      [] Yes- Altered Skin Integrity Present or Discovered   [] LDA Added if Not in Epic (Describe Wound)   [] New Altered Skin Integrity was Present on Admit and Documented in LDA   [] Wound Image Taken    Wound Care Consulted? No    Attending Nurse:  Paty Gomez RN/Staff Member: REESE Moon

## 2023-12-10 NOTE — CONSULTS
Cardiovascular Admission H&P    Patient Name: Manisha Kirk  Age: 78 y.o.  : 1945  MRN: [id number]  Admission Date: 2023  ?  Chief Complaint:   Chief Complaint   Patient presents with    Hand Problem     RIGHT ARM NUMBNESS AND COLD, DR FINK SENT HERE AFTER MRI SHOWED DISTAL RIGHT BRACHIAL ARTERY       History of Present Illness:  Manisha Simpsonman is a 78 y.o. female CKD hypothyroidism came in with a thrombotic brachial artery status post intervention status post CVA current patient is doing better still has some expressive aphasia symptoms were all sudden last week the progress note worse improve with the hospitalist stay in and coarse.  Cardiology consult for for embolic evaluation.  Patient denies chest pain nausea vomiting diaphoresis syncopal episode    Patient Active Problem List   Diagnosis    Hypothyroidism    Mixed hyperlipidemia    Nausea    Aortic atherosclerosis    Chronic kidney disease, stage 3a    Autoimmune thyroiditis    Cataract of both eyes    Hypertension    Inflammation of sacroiliac joint    Migraine headache    Obesity    Acute cerebrovascular accident (CVA) due to occlusion of left middle cerebral artery     Review of Systems:  Review of Systems -   Cardiovascular: No chest pain, shortness of breath, palpitations, or syncope except as documented in the history of present illness.  In addition, a 12 point review of systems was performed and reviewed with the patient and was negative except as indicated in the History of Present Illness.    Health Status  Review of patient's allergies indicates:   Allergen Reactions    Codeine      Other reaction(s): Vomiting    Penicillins      Other reaction(s): Vomiting       Past Medical History:   Diagnosis Date    Autoimmune thyroiditis     HLD (hyperlipidemia)     Hypertension        Current Facility-Administered Medications   Medication Dose Route Frequency Provider Last Rate Last Admin    0.9%  NaCl infusion   Intravenous Continuous  Fox Mcqueen  mL/hr at 12/10/23 0757 New Bag at 12/10/23 0757    aspirin EC tablet 325 mg  325 mg Oral Daily Fox Mcqueen MD   325 mg at 12/10/23 0743    atorvastatin tablet 40 mg  40 mg Oral Daily Fox Mcqueen MD   40 mg at 12/10/23 0743    carvediloL tablet 6.25 mg  6.25 mg Oral BID WM Jh Beckford MD   6.25 mg at 12/10/23 0743    famotidine tablet 20 mg  20 mg Oral Daily Jh Beckfodr MD   20 mg at 12/10/23 0743    heparin (porcine) injection 5,000 Units  5,000 Units Subcutaneous Q8H Mike Jaramillo DO   5,000 Units at 12/10/23 0631    hydrALAZINE injection 10 mg  10 mg Intravenous Q2H PRN Mike Jaramillo DO        labetaloL injection 10 mg  10 mg Intravenous Q2H PRN Mike Jaramillo DO        levothyroxine tablet 25 mcg  25 mcg Oral Before breakfast Jh Beckford MD        LORazepam (ATIVAN) injection 2 mg  2 mg Intravenous Once PRN Juani Hills MD        melatonin tablet 6 mg  6 mg Oral Nightly PRN Jh Beckford MD        morphine injection 2 mg  2 mg Intravenous Q4H PRN Jh Beckford MD        morphine injection 4 mg  4 mg Intravenous Q4H PRN Jh Beckford MD        mupirocin 2 % ointment   Nasal BID Asim Troy MD   Given at 12/10/23 0743    ondansetron injection 4 mg  4 mg Intravenous Q8H PRN Jh Beckford MD        sodium chloride 0.9% flush 10 mL  10 mL Intravenous PRN Jh Beckford MD        sodium chloride 0.9% flush 10 mL  10 mL Intravenous PRN Raheem Do MD        sodium chloride 0.9% flush 10 mL  10 mL Intravenous PRN Fox Mcqueen MD        sodium chloride 0.9% flush 10 mL  10 mL Intravenous PRN Mike Jaramillo DO         Facility-Administered Medications Ordered in Other Encounters   Medication Dose Route Frequency Provider Last Rate Last Admin    electrolyte-A infusion   Intravenous Continuous PRN Arnav Hand CRNA   New Bag at 12/07/23 2153    ePHEDrine sulfate   Intravenous PRN Arnav Hand CRNA   10 mg at 12/07/23 2151    fentaNYL injection   Intravenous  PRN Arnav Hand, CRNA   50 mcg at 12/07/23 2116    glycopyrrolate injection   Intravenous PRN Arnav Hand, CRNA   0.2 mg at 12/07/23 2105    heparin (porcine) injection   Intravenous PRN Arnav Hand, CRNA   5,000 Units at 12/07/23 2128    LIDOcaine (PF) 20 mg/mL (2%) injection   Intravenous PRN Arnav Hand, CRNA   40 mg at 12/07/23 2057    ondansetron HCl (PF) injection   Intravenous PRN Arnav Hand, CRNA   4 mg at 12/07/23 2105    phenylephrine HCl in 0.9% NaCl 1 mg/10 mL (100 mcg/mL) syringe   Intravenous PRN Arnav Hand, CRNA   200 mcg at 12/07/23 2108    propofol (DIPRIVAN) 10 mg/mL infusion   Intravenous PRN Arnav Hand, CRNA   120 mg at 12/07/23 2057    protamine injection   Intravenous PRN Arnav Hand, CRNA   30 mg at 12/07/23 2217    rocuronium injection   Intravenous PRN Arnav Hand, CRNA   50 mg at 12/07/23 2057    sugammadex (BRIDION) 100 mg/mL injection   Intravenous PRN Arnav Hand, CRNA   200 mg at 12/07/23 2234       History reviewed. No pertinent family history.    Past Surgical History:   Procedure Laterality Date    CATARACT EXTRACTION      CHOLECYSTECTOMY      SURGICAL REMOVAL OF BONE SPUR      TONSILLECTOMY      TOTAL ABDOMINAL HYSTERECTOMY         Social History     Socioeconomic History    Marital status:    Tobacco Use    Smoking status: Never    Smokeless tobacco: Never   Substance and Sexual Activity    Alcohol use: Never    Drug use: Never     Social Determinants of Health     Financial Resource Strain: Low Risk  (8/30/2022)    Overall Financial Resource Strain (CARDIA)     Difficulty of Paying Living Expenses: Not hard at all   Food Insecurity: No Food Insecurity (8/30/2022)    Hunger Vital Sign     Worried About Running Out of Food in the Last Year: Never true     Ran Out of Food in the Last Year: Never true   Transportation Needs: No Transportation Needs (8/30/2022)    PRAPARE - Transportation     Lack of Transportation (Medical): No     Lack of Transportation  "(Non-Medical): No   Stress: No Stress Concern Present (8/30/2022)    [place] [employer] - Occupational Stress Questionnaire     Feeling of Stress : Not at all   Housing Stability: Low Risk  (8/30/2022)    Housing Stability Vital Sign     Unable to Pay for Housing in the Last Year: No     Number of Places Lived in the Last Year: 1     Unstable Housing in the Last Year: No       Physical Examination:  Vital signs:  Patient Vitals for the past 8 hrs:   BP Temp Temp src Pulse SpO2   12/10/23 0752 -- 98.6 °F (37 °C) Oral -- --   12/10/23 0743 120/62 -- -- -- --   12/10/23 0400 (!) 97/51 97.9 °F (36.6 °C) Oral (!) 58 (!) 94 %        Estimated body surface area is 1.84 meters squared as calculated from the following:    Height as of this encounter: 5' 5" (1.651 m).    Weight as of this encounter: 73.5 kg (162 lb).    Physical Exam   Constitutional: The patient is oriented to person, place, and time and well-developed and well-nourished.  The patient is in no overt distress.   Eyes: Conjunctivae and EOM are normal. Pupils are equal, round, and reactive to light.   Neck: Normal range of motion. Neck supple.   Cardiovascular: Normal rate, regular rhythm and normal heart sounds.  Normal carotid upstroke.  2+ radial pulse.  No edema.  Pulmonary/Chest: Effort normal and breath sounds normal.   Abdominal: Soft. Bowel sounds are normal. There is no tenderness.   Musculoskeletal: Normal range of motion.  No palpable joint effusion.  Neurological: He is alert and oriented to person, place, and time. Gait normal.   Skin: Skin is warm and dry.  Normal color.  Psychiatric: Affect normal.  Mood stable.  ECG ST changes inferolateral walls  Echo viewed  Assessment:  78 y.o. female with     Brachial thrombus followed by vascular status post embolectomy  CVA continue with current Treatment follow neuro recommendation plan  DESIREE/loop recorder either inpatient versus outpatient will defer to primary cardiologist      Yang" Jerry

## 2023-12-10 NOTE — PROGRESS NOTES
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  Family at bedside.  Reports plans for DESIREE and ILR tomorrow.  No new neurological issues overnight.      Current Facility-Administered Medications   Medication Dose Route Frequency Provider Last Rate Last Admin    0.9%  NaCl infusion   Intravenous Continuous Fox Mcqueen  mL/hr at 12/10/23 0757 New Bag at 12/10/23 0757    aspirin EC tablet 325 mg  325 mg Oral Daily Fox Mcqueen MD   325 mg at 12/10/23 0743    atorvastatin tablet 40 mg  40 mg Oral Daily Fox Mcqueen MD   40 mg at 12/10/23 0743    carvediloL tablet 6.25 mg  6.25 mg Oral BID WM Jh Beckford MD   6.25 mg at 12/10/23 0743    docusate sodium capsule 50 mg  50 mg Oral Daily Adriel Ambrose MD   50 mg at 12/10/23 1605    famotidine tablet 20 mg  20 mg Oral Daily Jh Beckford MD   20 mg at 12/10/23 0743    heparin (porcine) injection 5,000 Units  5,000 Units Subcutaneous Q8H Mike Jaramillo DO   5,000 Units at 12/10/23 1310    hydrALAZINE injection 10 mg  10 mg Intravenous Q2H PRN Mike Jaramillo DO        labetaloL injection 10 mg  10 mg Intravenous Q2H PRN Mike Jaramillo DO        levothyroxine tablet 25 mcg  25 mcg Oral Before breakfast Jh Beckford MD        LORazepam (ATIVAN) injection 2 mg  2 mg Intravenous Once PRN Juani Hills MD        melatonin tablet 6 mg  6 mg Oral Nightly PRN Jh Beckford MD        morphine injection 2 mg  2 mg Intravenous Q4H PRN Jh Beckford MD        morphine injection 4 mg  4 mg Intravenous Q4H PRN Jh Beckford MD        mupirocin 2 %  ointment   Nasal BID Asim Troy MD   Given at 12/10/23 0743    ondansetron injection 4 mg  4 mg Intravenous Q8H PRN Jh Beckford MD        polyethylene glycol packet 17 g  17 g Oral Daily Adriel Ambrose MD   17 g at 12/10/23 1606    sodium chloride 0.9% flush 10 mL  10 mL Intravenous PRN Jh Beckford MD        sodium chloride 0.9% flush 10 mL  10 mL Intravenous PRN Raheem Do MD        sodium chloride 0.9% flush 10 mL  10 mL Intravenous PRN Fox Mcqueen MD        sodium chloride 0.9% flush 10 mL  10 mL Intravenous PRN Mike Jaramillo DO         Facility-Administered Medications Ordered in Other Encounters   Medication Dose Route Frequency Provider Last Rate Last Admin    electrolyte-A infusion   Intravenous Continuous PRN Arnav Hand CRNA   New Bag at 12/07/23 2153    ePHEDrine sulfate   Intravenous PRN Arnav Hand CRNA   10 mg at 12/07/23 2151    fentaNYL injection   Intravenous PRN Arnav Hand CRNA   50 mcg at 12/07/23 2116    glycopyrrolate injection   Intravenous PRN Arnav Hand CRNA   0.2 mg at 12/07/23 2105    heparin (porcine) injection   Intravenous PRN Arnav Hand CRNA   5,000 Units at 12/07/23 2128    LIDOcaine (PF) 20 mg/mL (2%) injection   Intravenous PRN Arnav Hand CRNA   40 mg at 12/07/23 2057    ondansetron HCl (PF) injection   Intravenous PRN Arnav Hand CRNA   4 mg at 12/07/23 2105    phenylephrine HCl in 0.9% NaCl 1 mg/10 mL (100 mcg/mL) syringe   Intravenous PRN Arnav Hand CRNA   200 mcg at 12/07/23 2108    propofol (DIPRIVAN) 10 mg/mL infusion   Intravenous PRN Arnav Hand CRNA   120 mg at 12/07/23 2057    protamine injection   Intravenous PRN Arnav Hand CRNA   30 mg at 12/07/23 2217    rocuronium injection   Intravenous PRN Arnav Hand CRNA   50 mg at 12/07/23 2057    sugammadex (BRIDION) 100 mg/mL injection   Intravenous PRN Arnav Hand CRNA   200 mg at 12/07/23 9978       Review of Systems  Objective:     Vital Signs (Most  Recent):  Temp: 98.6 °F (37 °C) (12/10/23 1200)  Pulse: (!) 54 (12/10/23 1517)  Resp: 17 (12/10/23 1517)  BP: 131/60 (12/10/23 1317)  SpO2: 97 % (12/10/23 1517) Vital Signs (24h Range):  Temp:  [97.9 °F (36.6 °C)-98.9 °F (37.2 °C)] 98.6 °F (37 °C)  Pulse:  [50-64] 54  Resp:  [10-23] 17  SpO2:  [92 %-99 %] 97 %  BP: ()/(51-68) 131/60     Weight: 73.5 kg (162 lb)  Body mass index is 26.96 kg/m².     Physical Exam  Vitals and nursing note reviewed.   Constitutional:       General: She is not in acute distress.     Appearance: She is not ill-appearing or toxic-appearing.   HENT:      Head: Normocephalic.   Eyes:      General: No visual field deficit.     Pupils: Pupils are equal, round, and reactive to light.   Pulmonary:      Effort: Pulmonary effort is normal.   Musculoskeletal:         General: Normal range of motion.      Cervical back: Normal range of motion.      Right lower leg: No edema.      Left lower leg: No edema.   Skin:     General: Skin is warm and dry.      Capillary Refill: Capillary refill takes less than 2 seconds.   Neurological:      Mental Status: She is alert.      Cranial Nerves: No dysarthria or facial asymmetry.      Sensory: No sensory deficit.      Motor: No weakness, tremor, abnormal muscle tone or pronator drift.      Coordination: Coordination normal. Finger-Nose-Finger Test normal.      Comments: Occasional word finding difficulty (family reports improvement)        Significant Labs: BMP:   Recent Labs   Lab 12/09/23  0120 12/10/23  0213    139   K 3.1* 3.6   CO2 22* 20*   BUN 9.7* 15.1   CREATININE 0.74 0.68   CALCIUM 7.2* 7.5*   MG 1.90  --      CBC:   Recent Labs   Lab 12/09/23  0119 12/10/23  0213   WBC 9.40 9.33   HGB 12.4 11.3*   HCT 35.6* 34.3*    187       Significant Imaging: I have reviewed all pertinent imaging results/findings within the past 24 hours.    Assessment and Plan:     Acute cerebrovascular accident (CVA) due to occlusion of left middle cerebral  artery  -presented for right hand numbness and pallor on 12/7, underwent a right brachial embolectomy with Dr. Beckford  -developed aphasia and right sided weakness on 12/8 at 2:00 am, found to have a left MCA occlusion  -intervention: diagnostic angio with spontaneous recanalization of left MCA with distal emboli s/p  intra-arterial tPA   -etiology: TBD      Stroke workup:  -CT head: negative  -CTA h/n: L MCA occlusion  -repeat CTh:  No acute intracranial findings identified  -ECHO:    Left Ventricle: The left ventricle is mildly dilated. Normal wall thickness. There is severe concentric hypertrophy. There is mildly reduced systolic function. Apical hypokinesis Grade II diastolic dysfunction.    Right Ventricle: Normal right ventricular cavity size. Systolic function is normal. TAPSE is 1.61 cm.    Tricuspid Valve: There is mild regurgitation.    Pulmonary Artery: No pulmonary hypertension.    The upper mid segments and apical segments of the LV show severe hypertrophy. Simpsons cannot be performed due to the apex being closed off.   -LDL: 121  -A1c: 5.7  -TSH: 3.139      Plan  -continue ASA 325mg daily  -continue Atorvastatin 40mg daily  -continue ST ... PT/OT signed off  -plans for DESIREE/ILR tomorrow  -follow up with Dr Mcqueen in 6-8 weeks    Further recommendations to follow by MD.        VTE Risk Mitigation (From admission, onward)           Ordered     heparin (porcine) injection 5,000 Units  Every 8 hours         12/08/23 0732     IP VTE HIGH RISK PATIENT  Once         12/07/23 1913     Place sequential compression device  Until discontinued         12/07/23 1913                    Becca Cerda, NO  Neurology  [facility] - [facility] ICU

## 2023-12-11 ENCOUNTER — TELEPHONE (OUTPATIENT)
Dept: NEUROLOGY | Facility: CLINIC | Age: 78
End: 2023-12-11
Payer: MEDICARE

## 2023-12-11 VITALS
HEIGHT: 65 IN | RESPIRATION RATE: 31 BRPM | HEART RATE: 55 BPM | TEMPERATURE: 98 F | DIASTOLIC BLOOD PRESSURE: 69 MMHG | BODY MASS INDEX: 26.99 KG/M2 | WEIGHT: 162 LBS | SYSTOLIC BLOOD PRESSURE: 135 MMHG | OXYGEN SATURATION: 96 %

## 2023-12-11 DIAGNOSIS — I63.512 ACUTE CEREBROVASCULAR ACCIDENT (CVA) DUE TO OCCLUSION OF LEFT MIDDLE CEREBRAL ARTERY: Primary | ICD-10-CM

## 2023-12-11 LAB
(HCYS)2 SERPL-MCNC: 5.4 UMOL/L (ref 5.1–15.4)
ALBUMIN SERPL-MCNC: 2.7 G/DL (ref 3.4–4.8)
ALBUMIN/GLOB SERPL: 1 RATIO (ref 1.1–2)
ALP SERPL-CCNC: 78 UNIT/L (ref 40–150)
ALT SERPL-CCNC: 10 UNIT/L (ref 0–55)
APTT PPP: 27.4 SECONDS (ref 23.2–33.7)
AST SERPL-CCNC: 16 UNIT/L (ref 5–34)
BASOPHILS # BLD AUTO: 0.04 X10(3)/MCL
BASOPHILS NFR BLD AUTO: 0.5 %
BILIRUB SERPL-MCNC: 0.4 MG/DL
BUN SERPL-MCNC: 12 MG/DL (ref 9.8–20.1)
CALCIUM SERPL-MCNC: 7.5 MG/DL (ref 8.4–10.2)
CHLORIDE SERPL-SCNC: 113 MMOL/L (ref 98–107)
CO2 SERPL-SCNC: 19 MMOL/L (ref 23–31)
CREAT SERPL-MCNC: 0.7 MG/DL (ref 0.55–1.02)
D DIMER PPP IA.FEU-MCNC: 1.3 UG/ML FEU (ref 0–0.5)
EOSINOPHIL # BLD AUTO: 0.34 X10(3)/MCL (ref 0–0.9)
EOSINOPHIL NFR BLD AUTO: 3.9 %
ERYTHROCYTE [DISTWIDTH] IN BLOOD BY AUTOMATED COUNT: 12.5 % (ref 11.5–17)
FIBRINOGEN PPP-MCNC: 443 MG/DL (ref 210–463)
GFR SERPLBLD CREATININE-BSD FMLA CKD-EPI: >60 MLS/MIN/1.73/M2
GLOBULIN SER-MCNC: 2.8 GM/DL (ref 2.4–3.5)
GLUCOSE SERPL-MCNC: 112 MG/DL (ref 82–115)
HCT VFR BLD AUTO: 33.4 % (ref 37–47)
HGB BLD-MCNC: 11.3 G/DL (ref 12–16)
IMM GRANULOCYTES # BLD AUTO: 0.01 X10(3)/MCL (ref 0–0.04)
IMM GRANULOCYTES NFR BLD AUTO: 0.1 %
INR PPP: 1
LYMPHOCYTES # BLD AUTO: 2.5 X10(3)/MCL (ref 0.6–4.6)
LYMPHOCYTES NFR BLD AUTO: 29 %
MCH RBC QN AUTO: 30.6 PG (ref 27–31)
MCHC RBC AUTO-ENTMCNC: 33.8 G/DL (ref 33–36)
MCV RBC AUTO: 90.5 FL (ref 80–94)
MONOCYTES # BLD AUTO: 0.84 X10(3)/MCL (ref 0.1–1.3)
MONOCYTES NFR BLD AUTO: 9.7 %
NEUTROPHILS # BLD AUTO: 4.9 X10(3)/MCL (ref 2.1–9.2)
NEUTROPHILS NFR BLD AUTO: 56.8 %
NRBC BLD AUTO-RTO: 0 %
PLATELET # BLD AUTO: 193 X10(3)/MCL (ref 130–400)
PMV BLD AUTO: 10.8 FL (ref 7.4–10.4)
POTASSIUM SERPL-SCNC: 3.7 MMOL/L (ref 3.5–5.1)
PROT SERPL-MCNC: 5.5 GM/DL (ref 5.8–7.6)
PROTHROMBIN TIME: 13.3 SECONDS (ref 12.5–14.5)
RBC # BLD AUTO: 3.69 X10(6)/MCL (ref 4.2–5.4)
SODIUM SERPL-SCNC: 140 MMOL/L (ref 136–145)
WBC # SPEC AUTO: 8.63 X10(3)/MCL (ref 4.5–11.5)

## 2023-12-11 PROCEDURE — 83695 ASSAY OF LIPOPROTEIN(A): CPT | Performed by: INTERNAL MEDICINE

## 2023-12-11 PROCEDURE — 85303 CLOT INHIBIT PROT C ACTIVITY: CPT | Performed by: INTERNAL MEDICINE

## 2023-12-11 PROCEDURE — 85610 PROTHROMBIN TIME: CPT | Performed by: INTERNAL MEDICINE

## 2023-12-11 PROCEDURE — 25000003 PHARM REV CODE 250: Performed by: PSYCHIATRY & NEUROLOGY

## 2023-12-11 PROCEDURE — 86147 CARDIOLIPIN ANTIBODY EA IG: CPT | Performed by: INTERNAL MEDICINE

## 2023-12-11 PROCEDURE — 25000003 PHARM REV CODE 250: Performed by: SURGERY

## 2023-12-11 PROCEDURE — 85613 RUSSELL VIPER VENOM DILUTED: CPT | Performed by: INTERNAL MEDICINE

## 2023-12-11 PROCEDURE — 85384 FIBRINOGEN ACTIVITY: CPT | Performed by: INTERNAL MEDICINE

## 2023-12-11 PROCEDURE — 80053 COMPREHEN METABOLIC PANEL: CPT

## 2023-12-11 PROCEDURE — 85730 THROMBOPLASTIN TIME PARTIAL: CPT | Performed by: INTERNAL MEDICINE

## 2023-12-11 PROCEDURE — 85025 COMPLETE CBC W/AUTO DIFF WBC: CPT | Performed by: SURGERY

## 2023-12-11 PROCEDURE — 99238 PR HOSPITAL DISCHARGE DAY,<30 MIN: ICD-10-PCS | Mod: ,,, | Performed by: INTERNAL MEDICINE

## 2023-12-11 PROCEDURE — 85300 ANTITHROMBIN III ACTIVITY: CPT | Performed by: INTERNAL MEDICINE

## 2023-12-11 PROCEDURE — 63600175 PHARM REV CODE 636 W HCPCS

## 2023-12-11 PROCEDURE — 85379 FIBRIN DEGRADATION QUANT: CPT | Performed by: INTERNAL MEDICINE

## 2023-12-11 PROCEDURE — 99238 HOSP IP/OBS DSCHRG MGMT 30/<: CPT | Mod: ,,, | Performed by: INTERNAL MEDICINE

## 2023-12-11 PROCEDURE — 83090 ASSAY OF HOMOCYSTEINE: CPT | Performed by: INTERNAL MEDICINE

## 2023-12-11 RX ORDER — ASPIRIN 325 MG
325 TABLET, DELAYED RELEASE (ENTERIC COATED) ORAL DAILY
Qty: 30 TABLET | Refills: 11
Start: 2023-12-12 | End: 2024-02-26

## 2023-12-11 RX ADMIN — ASPIRIN 325 MG: 325 TABLET, COATED ORAL at 09:12

## 2023-12-11 RX ADMIN — ATORVASTATIN CALCIUM 40 MG: 40 TABLET, FILM COATED ORAL at 09:12

## 2023-12-11 RX ADMIN — CARVEDILOL 6.25 MG: 3.12 TABLET, FILM COATED ORAL at 09:12

## 2023-12-11 RX ADMIN — HEPARIN SODIUM 5000 UNITS: 5000 INJECTION INTRAVENOUS; SUBCUTANEOUS at 05:12

## 2023-12-11 NOTE — PROGRESS NOTES
Cardiology Daily Progress Note    Patient Name: Manisha ARAIZA Kirk  Age: 78 y.o.  : 1945  MRN: [id number]  Admission Date: 2023      Subjective: No acute cardiac events overnight. She feels well. Remains in NSR. Strength is improving.       Review of Systems   General ROS: negative.  Respiratory ROS: no cough, shortness of breath, or wheezing.  Cardiovascular ROS: no chest pain or dyspnea on exertion.  Gastrointestinal ROS: no abdominal pain, change in bowel habits, or black or bloody stools.  Genito-Urinary ROS: no dysuria, trouble voiding, or hematuria.  Musculoskeletal ROS: negative.  Neurological ROS: negative.      Health Status:  Review of patient's allergies indicates:   Allergen Reactions    Codeine      Other reaction(s): Vomiting    Penicillins      Other reaction(s): Vomiting       Current Facility-Administered Medications   Medication Dose Route Frequency Provider Last Rate Last Admin    0.9%  NaCl infusion   Intravenous Continuous Fox Mcqueen  mL/hr at 12/10/23 1811 Rate Verify at 12/10/23 1811    aspirin EC tablet 325 mg  325 mg Oral Daily Fox Mcqueen MD   325 mg at 23 0916    atorvastatin tablet 40 mg  40 mg Oral Daily Fox Mcqueen MD   40 mg at 23 0916    carvediloL tablet 6.25 mg  6.25 mg Oral BID WM Jh Beckford MD   6.25 mg at 23 0916    docusate sodium capsule 50 mg  50 mg Oral Daily Adriel Ambrose MD   50 mg at 12/10/23 1605    famotidine tablet 20 mg  20 mg Oral Daily Jh Beckford MD   20 mg at 12/10/23 0743    heparin (porcine) injection 5,000 Units  5,000 Units Subcutaneous Q8H Mike Jaramillo DO   5,000 Units at 23 0541    hydrALAZINE injection 10 mg  10 mg Intravenous Q2H PRN Mike Jaramillo DO        labetaloL injection 10 mg  10 mg Intravenous Q2H PRN Mike Jaramillo DO        levothyroxine tablet 25 mcg  25 mcg Oral Before breakfast Jh Beckford MD        LORazepam (ATIVAN) injection 2 mg  2 mg Intravenous Once PRN Juani Hills MD         melatonin tablet 6 mg  6 mg Oral Nightly PRN Jh Beckford MD        morphine injection 2 mg  2 mg Intravenous Q4H PRN Jh Beckford MD        morphine injection 4 mg  4 mg Intravenous Q4H PRN Jh Beckford MD        mupirocin 2 % ointment   Nasal BID Asim Troy MD   Given at 12/10/23 2104    ondansetron injection 4 mg  4 mg Intravenous Q8H PRN Jh Beckford MD        polyethylene glycol packet 17 g  17 g Oral Daily Adriel Ambrose MD   17 g at 12/10/23 1606    sodium chloride 0.9% flush 10 mL  10 mL Intravenous PRN Jh Beckford MD        sodium chloride 0.9% flush 10 mL  10 mL Intravenous PRN Raheem Do MD        sodium chloride 0.9% flush 10 mL  10 mL Intravenous PRN Fox Mcqueen MD        sodium chloride 0.9% flush 10 mL  10 mL Intravenous PRN Mike Jaramillo DO         Facility-Administered Medications Ordered in Other Encounters   Medication Dose Route Frequency Provider Last Rate Last Admin    electrolyte-A infusion   Intravenous Continuous PRN Arnav Hand CRNA   New Bag at 12/07/23 2153    ePHEDrine sulfate   Intravenous PRN Arnav Hand CRNA   10 mg at 12/07/23 2151    fentaNYL injection   Intravenous PRN Arnav Hand CRNA   50 mcg at 12/07/23 2116    glycopyrrolate injection   Intravenous PRN Arnav Hand CRNA   0.2 mg at 12/07/23 2105    heparin (porcine) injection   Intravenous PRN Arnav Hand CRNA   5,000 Units at 12/07/23 2128    LIDOcaine (PF) 20 mg/mL (2%) injection   Intravenous PRN Arnav Hand CRNA   40 mg at 12/07/23 2057    ondansetron HCl (PF) injection   Intravenous PRN Arnav Hand CRNA   4 mg at 12/07/23 2105    phenylephrine HCl in 0.9% NaCl 1 mg/10 mL (100 mcg/mL) syringe   Intravenous PRN Arnav Hand CRNA   200 mcg at 12/07/23 2108    propofol (DIPRIVAN) 10 mg/mL infusion   Intravenous PRN Arnav Hand CRNA   120 mg at 12/07/23 2057    protamine injection   Intravenous PRN Arnav Hand CRNA   30 mg at 12/07/23 2217    rocuronium injection    Intravenous PRN Arnav Hand CRNA   50 mg at 12/07/23 2057    sugammadex (BRIDION) 100 mg/mL injection   Intravenous PRN Arnav Hand CRNA   200 mg at 12/07/23 2234       Objective:  Patient Vitals for the past 24 hrs:   BP Temp Temp src Pulse Resp SpO2   12/11/23 0800 135/67 98.3 °F (36.8 °C) Oral -- -- --   12/11/23 0600 -- -- -- (!) 59 19 (!) 93 %   12/11/23 0500 117/67 -- -- (!) 56 (!) 21 (!) 91 %   12/11/23 0400 117/67 98.6 °F (37 °C) Oral (!) 56 (!) 21 (!) 91 %   12/11/23 0300 -- -- -- (!) 58 (!) 22 (!) 92 %   12/11/23 0200 126/65 -- -- (!) 52 (!) 22 (!) 91 %   12/11/23 0005 126/65 98.4 °F (36.9 °C) Oral (!) 59 20 96 %   12/10/23 2200 -- -- -- (!) 57 20 (!) 92 %   12/10/23 2000 113/66 98.6 °F (37 °C) Oral (!) 59 (!) 22 (!) 92 %   12/10/23 1808 -- -- -- (!) 59 (!) 22 (!) 94 %   12/10/23 1800 -- -- -- 62 (!) 26 (!) 93 %   12/10/23 1643 -- -- -- (!) 56 (!) 23 96 %   12/10/23 1602 116/87 -- -- -- -- --   12/10/23 1600 -- 97.9 °F (36.6 °C) Oral -- -- --   12/10/23 1517 -- -- -- (!) 54 17 97 %   12/10/23 1500 -- -- -- (!) 58 (!) 22 99 %   12/10/23 1400 -- -- -- (!) 54 19 96 %   12/10/23 1317 131/60 -- -- -- -- --   12/10/23 1300 -- -- -- (!) 52 18 99 %   12/10/23 1200 -- 98.6 °F (37 °C) Oral (!) 50 17 97 %   12/10/23 1100 -- -- -- (!) 51 (!) 22 97 %   12/10/23 1014 119/64 -- -- -- -- --   12/10/23 1000 -- -- -- (!) 56 20 (!) 94 %     Recent Results (from the past 24 hour(s))   Comprehensive metabolic panel    Collection Time: 12/11/23  2:27 AM   Result Value Ref Range    Sodium Level 140 136 - 145 mmol/L    Potassium Level 3.7 3.5 - 5.1 mmol/L    Chloride 113 (H) 98 - 107 mmol/L    Carbon Dioxide 19 (L) 23 - 31 mmol/L    Glucose Level 112 82 - 115 mg/dL    Blood Urea Nitrogen 12.0 9.8 - 20.1 mg/dL    Creatinine 0.70 0.55 - 1.02 mg/dL    Calcium Level Total 7.5 (L) 8.4 - 10.2 mg/dL    Protein Total 5.5 (L) 5.8 - 7.6 gm/dL    Albumin Level 2.7 (L) 3.4 - 4.8 g/dL    Globulin 2.8 2.4 - 3.5 gm/dL    Albumin/Globulin  Ratio 1.0 (L) 1.1 - 2.0 ratio    Bilirubin Total 0.4 <=1.5 mg/dL    Alkaline Phosphatase 78 40 - 150 unit/L    Alanine Aminotransferase 10 0 - 55 unit/L    Aspartate Aminotransferase 16 5 - 34 unit/L    eGFR >60 mls/min/1.73/m2   CBC with Differential    Collection Time: 12/11/23  2:27 AM   Result Value Ref Range    WBC 8.63 4.50 - 11.50 x10(3)/mcL    RBC 3.69 (L) 4.20 - 5.40 x10(6)/mcL    Hgb 11.3 (L) 12.0 - 16.0 g/dL    Hct 33.4 (L) 37.0 - 47.0 %    MCV 90.5 80.0 - 94.0 fL    MCH 30.6 27.0 - 31.0 pg    MCHC 33.8 33.0 - 36.0 g/dL    RDW 12.5 11.5 - 17.0 %    Platelet 193 130 - 400 x10(3)/mcL    MPV 10.8 (H) 7.4 - 10.4 fL    Neut % 56.8 %    Lymph % 29.0 %    Mono % 9.7 %    Eos % 3.9 %    Basophil % 0.5 %    Lymph # 2.50 0.6 - 4.6 x10(3)/mcL    Neut # 4.90 2.1 - 9.2 x10(3)/mcL    Mono # 0.84 0.1 - 1.3 x10(3)/mcL    Eos # 0.34 0 - 0.9 x10(3)/mcL    Baso # 0.04 <=0.2 x10(3)/mcL    IG# 0.01 0 - 0.04 x10(3)/mcL    IG% 0.1 %    NRBC% 0.0 %     [unfilled]  Wt Readings from Last 3 Encounters:   12/07/23 73.5 kg (162 lb)   12/07/23 74.4 kg (164 lb)   09/06/23 74.8 kg (165 lb)       Physical Exam:  General: Alert and oriented, no acute distress.  Neck: No carotid bruit, no jugular venous distention.  Respiratory: Breath sounds are equal, symmetrical chest wall expansion. Breath sounds are clear, on room air .  Cardiovascular: Normal rate, regular rhythm. No murmur. No gallop. No edema noted. Patient is NSR on tele.  Integumentary: Clean, warm, dry, and intact.  Neurologic: Alert and oriented.   Psychiatric: Cooperative, appropriate mood and affect.        Assessment/Plan:    Arterial occlusion with post op CVA  - s/p right brachial, ulnar, and radial embolectomy on 12-7-2023  - CVA post procedure -> s/p cerebral angio with mechanical thrombectomy on 12-8-2023  - plan for DESIREE and LINQ monitor implant outpatient with primary cardiologist  - continue medical therapy with aspirin    Elevated troponin  - Type II in the setting  of acute CVA  - echo on 12-8-2023 with normal LVEF and grade 1 DD  - monitor on telemetry    HTN  - BP stable        *Patient of Dr. Orr in [place] office. She is cleared from cardiology standpoint to be discharged home today. She will need outpatient follow up with primary cardiology team to discuss DESIREE and LINQ monitor implant.             NO Avalos-MARISEL  [facility]

## 2023-12-11 NOTE — TELEPHONE ENCOUNTER
Received call from [facility] to schedule f/u for pt.   Appt was scheduled for 1/30/2024 @ 1:00.  She reported that pt is needing speech therapy, & she attempted to schedule pt an appt @ [facility] but they are needing orders to be sent.  She is inquiring on if Dr. Mcqueen can put in an order/referral to  to be sent to [facility] in [place].    CB# if needed [phone]

## 2023-12-11 NOTE — NURSING
Nurses Note -- 4 Eyes      12/10/2023   6:13 PM      Skin assessed during: Daily Assessment      [x] No Altered Skin Integrity Present    [x]Prevention Measures Documented      [] Yes- Altered Skin Integrity Present or Discovered   [] LDA Added if Not in Epic (Describe Wound)   [] New Altered Skin Integrity was Present on Admit and Documented in LDA   [] Wound Image Taken    Wound Care Consulted? No    Attending Nurse:  Anna Gomez RN/Staff Member:

## 2023-12-11 NOTE — PLAN OF CARE
Problem: Adult Inpatient Plan of Care  Goal: Plan of Care Review  Outcome: Ongoing, Progressing  Goal: Patient-Specific Goal (Individualized)  Outcome: Ongoing, Progressing  Goal: Absence of Hospital-Acquired Illness or Injury  Outcome: Ongoing, Progressing  Goal: Optimal Comfort and Wellbeing  Outcome: Ongoing, Progressing  Goal: Readiness for Transition of Care  Outcome: Ongoing, Progressing     Problem: Infection  Goal: Absence of Infection Signs and Symptoms  Outcome: Ongoing, Progressing     Problem: Adjustment to Illness (Stroke, Ischemic/Transient Ischemic Attack)  Goal: Optimal Coping  Outcome: Ongoing, Progressing     Problem: Cerebral Tissue Perfusion (Stroke, Ischemic/Transient Ischemic Attack)  Goal: Optimal Cerebral Tissue Perfusion  Outcome: Ongoing, Progressing     Problem: Bowel Elimination Impaired (Stroke, Ischemic/Transient Ischemic Attack)  Goal: Effective Bowel Elimination  Outcome: Ongoing, Progressing

## 2023-12-11 NOTE — NURSING
Nurses Note -- 4 Eyes      12/11/2023   2:19 AM      Skin assessed during: Daily Assessment      [x] No Altered Skin Integrity Present    [x]Prevention Measures Documented      [] Yes- Altered Skin Integrity Present or Discovered   [] LDA Added if Not in Epic (Describe Wound)   [] New Altered Skin Integrity was Present on Admit and Documented in LDA   [] Wound Image Taken    Wound Care Consulted? No    Attending Nurse:  Omi Gomez RN/Staff Member:  REESE Moon

## 2023-12-12 ENCOUNTER — TELEPHONE (OUTPATIENT)
Dept: NEUROLOGY | Facility: CLINIC | Age: 78
End: 2023-12-12
Payer: MEDICARE

## 2023-12-12 ENCOUNTER — PATIENT OUTREACH (OUTPATIENT)
Dept: ADMINISTRATIVE | Facility: CLINIC | Age: 78
End: 2023-12-12
Payer: MEDICARE

## 2023-12-12 LAB — PSYCHE PATHOLOGY RESULT: NORMAL

## 2023-12-12 NOTE — TELEPHONE ENCOUNTER
Pt's daughter Katie called in regards to pt.  She reports that pt was discharged from [facility] yesterday, but reports they were advised she was to have a scope done prior to discharge.  She is also inquiring on anticoagulation medication, she reports her brother was advised that pt would be sent home with anticoagulation medication.    # [phone]

## 2023-12-12 NOTE — PROGRESS NOTES
"C3 nurse spoke with Manisha Kirk's daughter Katie for a TCC post hospital discharge follow up call. Pts daughter states the pt is still having trouble finding her words but denies any new symptoms. She does have several questions about the pts meds. She states her brother was told numerous times that the pt would be started on an anticoagulant but all they were told to take is aspirin. They are wondering if she was supposed to be discharged on a stronger blood thinner. Also, she states the pt takes hydrochlorothiazide but it wasn't included on the pts med list, and she is wondering if she should continue taking it or not. She also has questions about the cardiac tests her mother was supposed to have. She states she messaged neurology with the questions this am but hasn't heard back. C3 nurse messaged inpt cardiac NP Annia Faye FNP via [employer] and is currently awaiting a response. C3 nurse told the pts daughter that per ALICIA Faye's note the pt was to "plan for DESIREE and LINQ monitor implant outpatient with primary cardiologist and continue medical therapy with aspirin." C3 nurse encouraged the pts daughter to call the pts cardiology clinic with med and scheduling questions and she verbalized understanding and stated she would do so today. OOC number also provided as well as [facility] support number to call and set up her mother's [employer] account- since the information was not given in the discharge paperwork.    The patient does not have a scheduled HOSFU appointment with her PCP, Piter Nolan II, MD within 5-7 days post hospital discharge date of 12/11/23, Pts daughter states the pt is switching to a GP Miller Herman MD and they have his clinic number to schedule a HOSFU. No messages routed at this time.   "

## 2023-12-13 LAB
AT III ACT/NOR PPP CHRO: 75 % (ref 80–130)
LPA SERPL-SCNC: 41 NMOL/L
MAYO GENERIC ORDERABLE RESULT: NORMAL
MIXING STUDIES PPP-IMP: NORMAL
PROT C ACT/NOR PPP CHRO: 125 % (ref 70–150)
SCREEN DRVVT/NORMAL: 0.99 RATIO

## 2023-12-13 NOTE — DISCHARGE SUMMARY
[facility] - [facility] ICU Hospital Medicine  Discharge Summary      Patient Name: Manisha Kirk  MRN: [id number]  Admission Date: 12/7/2023  Hospital Length of Stay: 4 days  Discharge Date and Time: 12/11/2023 12:23 PM  Attending Physician: No att. 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  Underwent mechanical thrombectomy with clot discovered and brachial, radial and ulnar arteries.  She was subsequently admitted to the hospital for ongoing monitoring, but developed acute onset aphasia and right-sided weakness. Code FAST was called and CT head demonstrated no acute changes.  CTA head/neck has a preliminary read of no acute abnormalities, but interventional neurology discovered a left MCA occlusion.  She was subsequently taken for mechanical thrombectomy and admitted to the ICU for ongoing care.  According to report, mechanical thrombectomy was unsuccessful and she ultimately required intra-arterial tPA.  On my exam today she has preserved strength and sensation in bilateral upper and lower extremities, with no receptive aphasia documented but word-finding difficulties noted.      Cardiology consulted for elevated troponin prior to ICU admission, but has already downtrended.  Patient denies any chest pain.  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  Will need ongoing outpatient follow-up.     Will likely need hypercoagulable workup and potential Holter monitor  for detection of any paroxysmal atrial fibrillation as an outpatient.      HPI:     Procedure(s) (LRB):  EMBOLECTOMY OR THROMBECTOMY, BLOOD VESSEL, UPPER EXTREMITY (Right)      Hospital Course:hospital Course was outlined as above.  Underwent embolectomy in the right brachial artery later on that night developed acute MCA stroke underwent attempted a embolectomy and also intra-arterial tPA.  She regained most of her motor function she does still have some continued aphasia.  Echocardiogram showed significant hypertrophy but no shunt or PFO or other abnormality.  Undergoing hypercoagulable workup.  She has been discharged on aspirin.  Overall stable worked with physical therapy did well okay to go home follow-up with Cardiology and other consultants    Consults:   Consults (From admission, onward)          Status Ordering Provider     Inpatient consult to Cardiology  Once        Provider:  Yeison Orr Jr., MD    Completed GEOVANI BOSE     Inpatient consult to Neurology  Once        Provider:  Antione Rowe MD    Completed JULITA MCQUEEN     Inpatient consult to Neurology  Once        Provider:  Julita Mcqueen MD    Completed ANTIONE ROWE     Inpatient consult to Cardiology  Once        Provider:  Jonas Hartmann MD    Completed JH BECKFORD     Inpatient consult to Vascular Surgery  Once        Provider:  Jh Beckford MD    Completed GEOVANI BENAVIDEZ            Final Active Diagnoses:    Diagnosis Date Noted POA    PRINCIPAL PROBLEM:  Acute cerebrovascular accident (CVA) due to occlusion of left middle cerebral artery [I63.512] 12/08/2023 No    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    Specialties: Neurology, Interventional  Radiology  Why: Janurary 30, 2024 @ 1pm  Contact information:  [address] [place] [address] [address] [place] [address]  [phone]               Jh Beckford MD Follow up.    Specialty: Vascular Surgery  Why: Janurary 3, 2024 @ 1:15  Contact information:  [address] [place] [address]  [phone]               Yeison Orr Jr., MD Follow up.    Specialty: Cardiology  Why: December 14, 2023 @ 2:20  Contact information:  [address] [place] [address]  [phone]                           Patient Instructions:   No discharge procedures on file.  Medications:  Reconciled Home Medications:      Medication List        START taking these medications      aspirin 325 MG EC tablet  Commonly known as: ECOTRIN  Take 1 tablet (325 mg total) by mouth once daily.            CONTINUE taking these medications      amLODIPine 5 MG tablet  Commonly known as: NORVASC  Take 5 mg by mouth once daily.     atorvastatin 20 MG tablet  Commonly known as: LIPITOR  Take 20 mg by mouth once daily. 1 tablet by mouth daily     carvediloL 6.25 MG tablet  Commonly known as: COREG  Take 6.25 mg by mouth 2 (two) times daily with meals.     levothyroxine 25 MCG tablet  Commonly known as: SYNTHROID  TAKE 1 TABLET BY MOUTH DAILY     ondansetron 8 MG Tbdl  Commonly known as: ZOFRAN-ODT  Take 8 mg by mouth every 8 (eight) hours as needed.     pantoprazole 20 MG tablet  Commonly known as: PROTONIX  TAKE 1 TABLET(20 MG) BY MOUTH EVERY DAY     PREMARIN 0.45 MG tablet  Generic drug: estrogens (conjugated)  TAKE 1 TABLET BY MOUTH DAILY     vitamin D 1000 units Tab  Commonly known as: VITAMIN D3  Take 1,000 Units by mouth once daily. 1 tablet by mouth once daily            STOP taking these medications      prochlorperazine 10 MG tablet  Commonly known as: COMPAZINE              Significant Diagnostic Studies: N/A    Pending Diagnostic Studies:       Procedure Component Value Units Date/Time    [place] GENERIC ORDERABLE ACLIP (Phospholipid  (Cardiolipin) Antibodies, IgA) [[id number]] Collected: 12/11/23 1158    Order Status: Sent Lab Status: In process Updated: 12/11/23 1500    Specimen: Blood           Indwelling Lines/Drains at time of discharge:   Lines/Drains/Airways       None                   Time spent on the discharge of patient: 32 minutes         OLGA LIDIA FINK MD  Department of Hospital Medicine  [facility] - [facility] ICU

## 2023-12-28 ENCOUNTER — TELEPHONE (OUTPATIENT)
Dept: NEUROLOGY | Facility: CLINIC | Age: 78
End: 2023-12-28
Payer: MEDICARE

## 2023-12-28 DIAGNOSIS — I63.512 ACUTE CEREBROVASCULAR ACCIDENT (CVA) DUE TO OCCLUSION OF LEFT MIDDLE CEREBRAL ARTERY: Primary | ICD-10-CM

## 2023-12-28 NOTE — TELEPHONE ENCOUNTER
Pt states she recently  had a stroke 20 days ago. She is asking if she can now drive.  Requesting a callback.  Please advise.

## 2024-01-25 ENCOUNTER — TELEPHONE (OUTPATIENT)
Dept: INTERNAL MEDICINE | Facility: CLINIC | Age: 79
End: 2024-01-25
Payer: MEDICARE

## 2024-01-25 DIAGNOSIS — K21.9 GASTROESOPHAGEAL REFLUX DISEASE, UNSPECIFIED WHETHER ESOPHAGITIS PRESENT: ICD-10-CM

## 2024-01-25 RX ORDER — PANTOPRAZOLE SODIUM 20 MG/1
20 TABLET, DELAYED RELEASE ORAL DAILY
Qty: 90 TABLET | Refills: 3 | Status: SHIPPED | OUTPATIENT
Start: 2024-01-25

## 2024-01-25 NOTE — TELEPHONE ENCOUNTER
----- Message from Kirsten Garcia sent at 1/25/2024  3:51 PM CST -----  Regarding: med  .Type:  RX Refill Request    Who Called: pt  Refill or New Rx:refill  RX Name and Strength:pantprazole  How is the patient currently taking it? (ex. 1XDay):  Is this a 30 day or 90 day RX:  Preferred Pharmacy with phone number:  Local or Mail Order:  Ordering Provider:  Would the patient rather a call back or a response via [facility]?   Best Call Back Number: [phone]  Additional Information: pt request med that I'm unable to locate in chart

## 2024-01-30 ENCOUNTER — OFFICE VISIT (OUTPATIENT)
Dept: NEUROLOGY | Facility: CLINIC | Age: 79
End: 2024-01-30
Payer: MEDICARE

## 2024-01-30 VITALS
HEIGHT: 65 IN | BODY MASS INDEX: 26.99 KG/M2 | DIASTOLIC BLOOD PRESSURE: 80 MMHG | WEIGHT: 162 LBS | SYSTOLIC BLOOD PRESSURE: 129 MMHG | HEART RATE: 54 BPM

## 2024-01-30 DIAGNOSIS — I10 HYPERTENSION, UNSPECIFIED TYPE: ICD-10-CM

## 2024-01-30 DIAGNOSIS — E78.2 MIXED HYPERLIPIDEMIA: ICD-10-CM

## 2024-01-30 DIAGNOSIS — I63.512 ACUTE CEREBROVASCULAR ACCIDENT (CVA) DUE TO OCCLUSION OF LEFT MIDDLE CEREBRAL ARTERY: ICD-10-CM

## 2024-01-30 DIAGNOSIS — I63.40 CEREBROVASCULAR ACCIDENT (CVA) DUE TO EMBOLISM OF CEREBRAL ARTERY: Primary | ICD-10-CM

## 2024-01-30 PROCEDURE — 99214 OFFICE O/P EST MOD 30 MIN: CPT | Mod: S$PBB,,, | Performed by: PSYCHIATRY & NEUROLOGY

## 2024-01-30 PROCEDURE — 99213 OFFICE O/P EST LOW 20 MIN: CPT | Mod: PBBFAC | Performed by: PSYCHIATRY & NEUROLOGY

## 2024-01-30 PROCEDURE — 99999 PR PBB SHADOW E&M-EST. PATIENT-LVL III: CPT | Mod: PBBFAC,,, | Performed by: PSYCHIATRY & NEUROLOGY

## 2024-01-30 RX ORDER — LOSARTAN POTASSIUM 25 MG/1
25 TABLET ORAL
COMMUNITY
Start: 2023-12-15 | End: 2024-06-12

## 2024-01-30 RX ORDER — APIXABAN 5 MG/1
TABLET, FILM COATED ORAL
COMMUNITY

## 2024-01-30 RX ORDER — ROSUVASTATIN CALCIUM 20 MG/1
1 TABLET, COATED ORAL EVERY MORNING
COMMUNITY
End: 2024-05-06

## 2024-01-30 NOTE — PROGRESS NOTES
Neurology Office Visit  Neurology  HPI:    Manisha Kirk is a 78 y.o. female who presents to clinic following a recent hospitalization due to left MCA stroke s/p IA thrombolysis on 12/8/23. She had initially presented with right brachial artery thrombus s/p thrombectomy with history of HTN, HLD, HFrEF. She did not have significant residual symptoms on discharge except for mild aphasia. She completed outpatient Speech therapy and was discharged. She is on aspirin, eliquis for stroke prevention. She also has a loop recorder implanted and is undergoing work up for hypercoagulable conditions with hematology.     Today, she denies any symptoms and denies any recurrence of stroke symptoms. She remains compliant with medications and denies any bleeding episodes.     ROS:  Review of Systems   HENT:  Negative for nosebleeds.    Eyes:  Negative for blurred vision and double vision.   Respiratory:  Negative for hemoptysis.    Gastrointestinal:  Negative for blood in stool and melena.   Neurological:  Negative for dizziness, sensory change, speech change, focal weakness and headaches.   Endo/Heme/Allergies:  Does not bruise/bleed easily.        Past Medical History:   Diagnosis Date    Autoimmune thyroiditis     HLD (hyperlipidemia)     Hypertension      Past Surgical History:   Procedure Laterality Date    CATARACT EXTRACTION      CHOLECYSTECTOMY      EMBOLECTOMY OR THROMBECTOMY, BLOOD VESSEL, UPPER EXTREMITY Right 12/7/2023    Procedure: EMBOLECTOMY OR THROMBECTOMY, BLOOD VESSEL, UPPER EXTREMITY;  Surgeon: Jh Beckford MD;  Location: [facility];  Service: Peripheral Vascular;  Laterality: Right;    SURGICAL REMOVAL OF BONE SPUR      TONSILLECTOMY      TOTAL ABDOMINAL HYSTERECTOMY       History reviewed. No pertinent family history.  Review of patient's allergies indicates:   Allergen Reactions    Codeine      Other reaction(s): Vomiting    Penicillins      Other reaction(s): Vomiting       Current Outpatient Medications:      amLODIPine (NORVASC) 5 MG tablet, Take 5 mg by mouth once daily., Disp: , Rfl:     aspirin (ECOTRIN) 325 MG EC tablet, Take 1 tablet (325 mg total) by mouth once daily., Disp: 30 tablet, Rfl: 11    carvediloL (COREG) 6.25 MG tablet, Take 6.25 mg by mouth 2 (two) times daily with meals., Disp: , Rfl:     ELIQUIS 5 mg Tab, SMARTSI Tablet(s) By Mouth Morning-Night, Disp: , Rfl:     estrogens, conjugated, (PREMARIN) 0.45 MG tablet, TAKE 1 TABLET BY MOUTH DAILY, Disp: 90 tablet, Rfl: 3    levothyroxine (SYNTHROID) 25 MCG tablet, TAKE 1 TABLET BY MOUTH DAILY, Disp: 90 tablet, Rfl: 3    losartan (COZAAR) 25 MG tablet, Take 25 mg by mouth., Disp: , Rfl:     ondansetron (ZOFRAN-ODT) 8 MG TbDL, Take 8 mg by mouth every 8 (eight) hours as needed., Disp: , Rfl:     pantoprazole (PROTONIX) 20 MG tablet, Take 1 tablet (20 mg total) by mouth once daily., Disp: 90 tablet, Rfl: 3    rosuvastatin (CRESTOR) 20 MG tablet, Take 1 tablet by mouth every morning., Disp: , Rfl:     vitamin D (VITAMIN D3) 1000 units Tab, Take 1,000 Units by mouth once daily. 1 tablet by mouth once daily, Disp: , Rfl:   Outpatient Medications Marked as Taking for the 24 encounter (Office Visit) with Fox Mcqueen MD   Medication Sig Dispense Refill    amLODIPine (NORVASC) 5 MG tablet Take 5 mg by mouth once daily.      aspirin (ECOTRIN) 325 MG EC tablet Take 1 tablet (325 mg total) by mouth once daily. 30 tablet 11    carvediloL (COREG) 6.25 MG tablet Take 6.25 mg by mouth 2 (two) times daily with meals.      ELIQUIS 5 mg Tab SMARTSI Tablet(s) By Mouth Morning-Night      estrogens, conjugated, (PREMARIN) 0.45 MG tablet TAKE 1 TABLET BY MOUTH DAILY 90 tablet 3    levothyroxine (SYNTHROID) 25 MCG tablet TAKE 1 TABLET BY MOUTH DAILY 90 tablet 3    losartan (COZAAR) 25 MG tablet Take 25 mg by mouth.      ondansetron (ZOFRAN-ODT) 8 MG TbDL Take 8 mg by mouth every 8 (eight) hours as needed.      pantoprazole (PROTONIX) 20 MG tablet Take 1 tablet (20  mg total) by mouth once daily. 90 tablet 3    rosuvastatin (CRESTOR) 20 MG tablet Take 1 tablet by mouth every morning.      vitamin D (VITAMIN D3) 1000 units Tab Take 1,000 Units by mouth once daily. 1 tablet by mouth once daily       Social History     Tobacco Use    Smoking status: Never    Smokeless tobacco: Never   Substance Use Topics    Alcohol use: Never    Drug use: Never         Vitals:    01/30/24 1317   BP: 129/80   Pulse: (!) 54       Physical Exam:  HEENT NC/AT  CV RRR, no tenderness  Resp clear without dyspnea  GI soft  Ext no edema    Neuro:  Alert & oriented x 3  EOMI, PERRLA, visual field intact in all 4 quadrants  Face symmetric, speech clear and fluent, facial sensation equal bilaterally  Tongue midline  Strength 5/5 in bilateral upper and lower extremities   Sensation intact and equal bilaterally  Gait steady and balanced     Imaging:  Reviewed DSA    Assessment:   Ms Kirk is a pleasant 78 year old female who presents to clinic following a recent hospitalization due to left MCA stroke s/p IA thrombolysis on 12/8/23. She had initially presented with right brachial artery thrombus s/p thrombectomy with history of HTN, HLD, HFrEF. She was discharged on aspirin and eliquis for stroke prevention.     I discussed possible etiology of her stroke which includes Embolic stroke of undetermined source (ESUS) given the fact that she had a right brachial and left MCA emboli. She is doing remarkable well considering the stroke. She is due to see her cardiologist in April regarding blood tests. I explained that there has been no documented evidence of A.Fib but given the two separate embolic event to right brachial and left MCA, there is high suspicion for a cardioembolic etiology. I also explained that there is higher risk of bleeding while on both aspirin and eliquis. From a neurological standpoint, I recommend discontinuing aspirin at this time and remain on eliquis for stroke prevention. However, I will  wait for her cardiologist to clear her for this. She is also completing work up for hypercoagulable conditions.     Plan:   - continue eliquis 5 mg bid  - continue aspirin 81mg until cleared by cardiology. From neurological standpoint, patient does not need to be on aspirin.   - f/u as needed    Fox Mcqueen MD  Vascular and Interventional Neurology    30 minutes were personally spent on this visit including my review of available records from referring physician, prior imaging, comprehensive physical and neurologic examination and discussion with the patient.

## 2024-02-26 ENCOUNTER — OFFICE VISIT (OUTPATIENT)
Dept: URGENT CARE | Facility: CLINIC | Age: 79
End: 2024-02-26
Payer: MEDICARE

## 2024-02-26 VITALS
HEIGHT: 65 IN | SYSTOLIC BLOOD PRESSURE: 147 MMHG | HEART RATE: 59 BPM | DIASTOLIC BLOOD PRESSURE: 75 MMHG | TEMPERATURE: 99 F | WEIGHT: 162 LBS | OXYGEN SATURATION: 98 % | RESPIRATION RATE: 16 BRPM | BODY MASS INDEX: 26.99 KG/M2

## 2024-02-26 DIAGNOSIS — H00.011 HORDEOLUM EXTERNUM OF RIGHT UPPER EYELID: Primary | ICD-10-CM

## 2024-02-26 PROCEDURE — 99212 OFFICE O/P EST SF 10 MIN: CPT | Mod: ,,, | Performed by: NURSE PRACTITIONER

## 2024-02-26 RX ORDER — ERYTHROMYCIN 5 MG/G
OINTMENT OPHTHALMIC EVERY 8 HOURS
Qty: 3.5 G | Refills: 0 | Status: SHIPPED | OUTPATIENT
Start: 2024-02-26 | End: 2024-03-04

## 2024-02-26 RX ORDER — ASPIRIN 81 MG/1
1 TABLET ORAL DAILY
COMMUNITY

## 2024-02-26 NOTE — PROGRESS NOTES
"Subjective:      Patient ID: Manisha ARAIZA Kirk is a 78 y.o. female.    Vitals:  height is 5' 5" (1.651 m) and weight is 73.5 kg (162 lb). Her temperature is 98.8 °F (37.1 °C). Her blood pressure is 147/75 (abnormal) and her pulse is 59 (abnormal). Her respiration is 16 and oxygen saturation is 98%.     Chief Complaint: Eye Problem (Red bump to right upper eyelid since last Tuesday. )    78-year-old white female presents to clinic complaining of right upper eye lid pain, itching, swelling, and redness x1 week.  Patient states she has had a stye in the past and today she has a another.  Patient states she has been treating with warm compresses for the last week and has been unsuccessful with treatment.  Patient did admit right upper stye has gotten a little better over the last couple of days.  Patient requesting evaluation and possible antibiotic ointment.  Patient denies any visual disturbance.    Eye Problem   Associated symptoms include itching.     Constitution: Negative.   HENT: Negative.     Neck: neck negative.   Cardiovascular: Negative.    Eyes:  Positive for eye itching and eyelid swelling.   Gastrointestinal: Negative.    Endocrine: negative.   Genitourinary: Negative.    Musculoskeletal: Negative.    Skin: Negative.    Allergic/Immunologic: Negative.    Neurological: Negative.    Hematologic/Lymphatic: Negative.    Psychiatric/Behavioral: Negative.      Objective:     Physical Exam   Constitutional: She is oriented to person, place, and time. She appears well-developed. She is cooperative.   HENT:   Head: Normocephalic and atraumatic.   Ears:   Right Ear: Hearing, tympanic membrane, external ear and ear canal normal.   Left Ear: Hearing, tympanic membrane, external ear and ear canal normal.   Nose: Nose normal. No mucosal edema or nasal deformity. No epistaxis. Right sinus exhibits no maxillary sinus tenderness and no frontal sinus tenderness. Left sinus exhibits no maxillary sinus tenderness and no " frontal sinus tenderness.   Mouth/Throat: Uvula is midline, oropharynx is clear and moist and mucous membranes are normal. No trismus in the jaw. Normal dentition. No uvula swelling.   Eyes: Conjunctivae and lids are normal. Right eye exhibits hordeolum.     vision grossly intact gaze aligned appropriately   Neck: Trachea normal and phonation normal. Neck supple.   Cardiovascular: Normal rate, regular rhythm, normal heart sounds and normal pulses.   Pulmonary/Chest: Effort normal and breath sounds normal.   Abdominal: Normal appearance and bowel sounds are normal. Soft.   Musculoskeletal: Normal range of motion.         General: Normal range of motion.   Neurological: She is alert and oriented to person, place, and time. She exhibits normal muscle tone.   Skin: Skin is warm, dry and intact.   Psychiatric: Her speech is normal and behavior is normal. Judgment and thought content normal.   Nursing note and vitals reviewed.    Assessment:     1. Hordeolum externum of right upper eyelid        Plan:       Hordeolum externum of right upper eyelid

## 2024-03-11 PROBLEM — I63.512 ACUTE CEREBROVASCULAR ACCIDENT (CVA) DUE TO OCCLUSION OF LEFT MIDDLE CEREBRAL ARTERY: Status: RESOLVED | Noted: 2023-12-08 | Resolved: 2024-03-11

## 2024-05-04 DIAGNOSIS — E78.2 MIXED HYPERLIPIDEMIA: Primary | ICD-10-CM

## 2024-05-06 PROBLEM — I63.40 CEREBROVASCULAR ACCIDENT (CVA) DUE TO EMBOLISM OF CEREBRAL ARTERY: Status: RESOLVED | Noted: 2023-12-14 | Resolved: 2024-05-06

## 2024-05-06 RX ORDER — ROSUVASTATIN CALCIUM 20 MG/1
20 TABLET, COATED ORAL
Qty: 90 TABLET | Refills: 3 | Status: SHIPPED | OUTPATIENT
Start: 2024-05-06

## 2024-09-03 ENCOUNTER — TELEPHONE (OUTPATIENT)
Dept: INTERNAL MEDICINE | Facility: CLINIC | Age: 79
End: 2024-09-03
Payer: MEDICARE

## 2024-09-03 DIAGNOSIS — E55.9 VITAMIN D DEFICIENCY: ICD-10-CM

## 2024-09-03 DIAGNOSIS — R73.01 IFG (IMPAIRED FASTING GLUCOSE): ICD-10-CM

## 2024-09-03 DIAGNOSIS — E03.9 HYPOTHYROIDISM, UNSPECIFIED TYPE: ICD-10-CM

## 2024-09-03 DIAGNOSIS — N18.31 CHRONIC KIDNEY DISEASE, STAGE 3A: ICD-10-CM

## 2024-09-03 DIAGNOSIS — E78.5 HYPERLIPIDEMIA, UNSPECIFIED HYPERLIPIDEMIA TYPE: ICD-10-CM

## 2024-09-03 DIAGNOSIS — I10 HYPERTENSION, UNSPECIFIED TYPE: ICD-10-CM

## 2024-09-03 DIAGNOSIS — Z00.00 WELLNESS EXAMINATION: Primary | ICD-10-CM

## 2024-09-03 DIAGNOSIS — E66.9 OBESITY, UNSPECIFIED CLASSIFICATION, UNSPECIFIED OBESITY TYPE, UNSPECIFIED WHETHER SERIOUS COMORBIDITY PRESENT: ICD-10-CM

## 2024-09-03 DIAGNOSIS — E78.2 MIXED HYPERLIPIDEMIA: ICD-10-CM

## 2024-09-03 DIAGNOSIS — I70.0 AORTIC ATHEROSCLEROSIS: ICD-10-CM

## 2024-09-03 NOTE — TELEPHONE ENCOUNTER
----- Message from Edilberto John LPN sent at 9/3/2024  8:17 AM CDT -----  Regarding: shemar wilhelm 9/10 @1:20  Are there any outstanding tasks in patient chart? Needs fasting labs    Is there documentation of outstanding tasks in patient chart? no    Has patient been to the ER, urgent care, or another physician since last visit?    Has patient done any blood work or x-rays since last visit?    5. PLEASE HAVE PATIENT BRING MEDICATION LIST OR BOTTLES TO EVERY OFFICE VISIT

## 2024-09-09 ENCOUNTER — LAB VISIT (OUTPATIENT)
Dept: LAB | Facility: HOSPITAL | Age: 79
End: 2024-09-09
Attending: INTERNAL MEDICINE
Payer: MEDICARE

## 2024-09-09 DIAGNOSIS — E78.5 HYPERLIPIDEMIA, UNSPECIFIED HYPERLIPIDEMIA TYPE: ICD-10-CM

## 2024-09-09 DIAGNOSIS — Z00.00 WELLNESS EXAMINATION: ICD-10-CM

## 2024-09-09 DIAGNOSIS — R73.01 IFG (IMPAIRED FASTING GLUCOSE): ICD-10-CM

## 2024-09-09 DIAGNOSIS — E78.2 MIXED HYPERLIPIDEMIA: ICD-10-CM

## 2024-09-09 DIAGNOSIS — N18.31 CHRONIC KIDNEY DISEASE, STAGE 3A: ICD-10-CM

## 2024-09-09 DIAGNOSIS — I70.0 AORTIC ATHEROSCLEROSIS: ICD-10-CM

## 2024-09-09 DIAGNOSIS — I10 HYPERTENSION, UNSPECIFIED TYPE: ICD-10-CM

## 2024-09-09 DIAGNOSIS — E66.9 OBESITY, UNSPECIFIED CLASSIFICATION, UNSPECIFIED OBESITY TYPE, UNSPECIFIED WHETHER SERIOUS COMORBIDITY PRESENT: ICD-10-CM

## 2024-09-09 DIAGNOSIS — E03.9 HYPOTHYROIDISM, UNSPECIFIED TYPE: ICD-10-CM

## 2024-09-09 DIAGNOSIS — E55.9 VITAMIN D DEFICIENCY: ICD-10-CM

## 2024-09-09 LAB
25(OH)D3+25(OH)D2 SERPL-MCNC: 79 NG/ML (ref 30–80)
ALBUMIN SERPL-MCNC: 4 G/DL (ref 3.4–4.8)
ALBUMIN/GLOB SERPL: 1.1 RATIO (ref 1.1–2)
ALP SERPL-CCNC: 79 UNIT/L (ref 40–150)
ALT SERPL-CCNC: 15 UNIT/L (ref 0–55)
ANION GAP SERPL CALC-SCNC: 10 MEQ/L
AST SERPL-CCNC: 19 UNIT/L (ref 5–34)
BASOPHILS # BLD AUTO: 0.07 X10(3)/MCL
BASOPHILS NFR BLD AUTO: 0.6 %
BILIRUB SERPL-MCNC: 0.5 MG/DL
BUN SERPL-MCNC: 24.1 MG/DL (ref 9.8–20.1)
CALCIUM SERPL-MCNC: 10 MG/DL (ref 8.4–10.2)
CHLORIDE SERPL-SCNC: 105 MMOL/L (ref 98–107)
CHOLEST SERPL-MCNC: 244 MG/DL
CHOLEST/HDLC SERPL: 5 {RATIO} (ref 0–5)
CO2 SERPL-SCNC: 26 MMOL/L (ref 23–31)
CREAT SERPL-MCNC: 1.27 MG/DL (ref 0.55–1.02)
CREAT/UREA NIT SERPL: 19
EOSINOPHIL # BLD AUTO: 0.48 X10(3)/MCL (ref 0–0.9)
EOSINOPHIL NFR BLD AUTO: 4.5 %
ERYTHROCYTE [DISTWIDTH] IN BLOOD BY AUTOMATED COUNT: 13.9 % (ref 11.5–17)
GFR SERPLBLD CREATININE-BSD FMLA CKD-EPI: 43 ML/MIN/1.73/M2
GLOBULIN SER-MCNC: 3.5 GM/DL (ref 2.4–3.5)
GLUCOSE SERPL-MCNC: 156 MG/DL (ref 82–115)
HCT VFR BLD AUTO: 42.6 % (ref 37–47)
HDLC SERPL-MCNC: 50 MG/DL (ref 35–60)
HGB BLD-MCNC: 13.8 G/DL (ref 12–16)
IMM GRANULOCYTES # BLD AUTO: 0.05 X10(3)/MCL (ref 0–0.04)
IMM GRANULOCYTES NFR BLD AUTO: 0.5 %
LDLC SERPL CALC-MCNC: 140 MG/DL (ref 50–140)
LYMPHOCYTES # BLD AUTO: 4.81 X10(3)/MCL (ref 0.6–4.6)
LYMPHOCYTES NFR BLD AUTO: 44.6 %
MCH RBC QN AUTO: 27.7 PG (ref 27–31)
MCHC RBC AUTO-ENTMCNC: 32.4 G/DL (ref 33–36)
MCV RBC AUTO: 85.5 FL (ref 80–94)
MONOCYTES # BLD AUTO: 0.93 X10(3)/MCL (ref 0.1–1.3)
MONOCYTES NFR BLD AUTO: 8.6 %
NEUTROPHILS # BLD AUTO: 4.44 X10(3)/MCL (ref 2.1–9.2)
NEUTROPHILS NFR BLD AUTO: 41.2 %
NRBC BLD AUTO-RTO: 0 %
PLATELET # BLD AUTO: 243 X10(3)/MCL (ref 130–400)
PMV BLD AUTO: 11.1 FL (ref 7.4–10.4)
POTASSIUM SERPL-SCNC: 4.4 MMOL/L (ref 3.5–5.1)
PROT SERPL-MCNC: 7.5 GM/DL (ref 5.8–7.6)
RBC # BLD AUTO: 4.98 X10(6)/MCL (ref 4.2–5.4)
SODIUM SERPL-SCNC: 141 MMOL/L (ref 136–145)
T4 FREE SERPL-MCNC: 0.96 NG/DL (ref 0.7–1.48)
TRIGL SERPL-MCNC: 271 MG/DL (ref 37–140)
TSH SERPL-ACNC: 4.91 UIU/ML (ref 0.35–4.94)
VLDLC SERPL CALC-MCNC: 54 MG/DL
WBC # BLD AUTO: 10.78 X10(3)/MCL (ref 4.5–11.5)

## 2024-09-09 PROCEDURE — 36415 COLL VENOUS BLD VENIPUNCTURE: CPT

## 2024-09-09 PROCEDURE — 82306 VITAMIN D 25 HYDROXY: CPT

## 2024-09-09 PROCEDURE — 84443 ASSAY THYROID STIM HORMONE: CPT

## 2024-09-09 PROCEDURE — 85025 COMPLETE CBC W/AUTO DIFF WBC: CPT

## 2024-09-09 PROCEDURE — 80053 COMPREHEN METABOLIC PANEL: CPT

## 2024-09-09 PROCEDURE — 84439 ASSAY OF FREE THYROXINE: CPT

## 2024-09-09 PROCEDURE — 80061 LIPID PANEL: CPT

## 2024-09-10 ENCOUNTER — OFFICE VISIT (OUTPATIENT)
Dept: INTERNAL MEDICINE | Facility: CLINIC | Age: 79
End: 2024-09-10
Payer: MEDICARE

## 2024-09-10 VITALS
DIASTOLIC BLOOD PRESSURE: 80 MMHG | WEIGHT: 168 LBS | SYSTOLIC BLOOD PRESSURE: 126 MMHG | OXYGEN SATURATION: 98 % | HEIGHT: 65 IN | BODY MASS INDEX: 27.99 KG/M2 | HEART RATE: 63 BPM | RESPIRATION RATE: 18 BRPM

## 2024-09-10 DIAGNOSIS — E03.2 HYPOTHYROIDISM DUE TO NON-MEDICATION EXOGENOUS SUBSTANCES: ICD-10-CM

## 2024-09-10 DIAGNOSIS — R73.01 IFG (IMPAIRED FASTING GLUCOSE): ICD-10-CM

## 2024-09-10 DIAGNOSIS — N18.31 CHRONIC KIDNEY DISEASE, STAGE 3A: ICD-10-CM

## 2024-09-10 DIAGNOSIS — I74.2: ICD-10-CM

## 2024-09-10 DIAGNOSIS — E06.3 AUTOIMMUNE THYROIDITIS: ICD-10-CM

## 2024-09-10 DIAGNOSIS — I10 HYPERTENSION, UNSPECIFIED TYPE: ICD-10-CM

## 2024-09-10 DIAGNOSIS — E78.2 MIXED HYPERLIPIDEMIA: Primary | ICD-10-CM

## 2024-09-10 PROCEDURE — G0439 PPPS, SUBSEQ VISIT: HCPCS | Mod: ,,, | Performed by: INTERNAL MEDICINE

## 2024-09-10 RX ORDER — METOPROLOL SUCCINATE 100 MG/1
100 TABLET, EXTENDED RELEASE ORAL DAILY
COMMUNITY
Start: 2024-08-21

## 2024-09-10 RX ORDER — ROSUVASTATIN CALCIUM 40 MG/1
40 TABLET, COATED ORAL DAILY
COMMUNITY
Start: 2024-06-27

## 2024-09-10 NOTE — PROGRESS NOTES
Patient ID: Manisha Kirk is a 79 y.o. female.    Chief Complaint: Medicare AWV (Labs done 9/9)      Patient Care Team:  Piter Nolan II, MD as PCP - General (Internal Medicine)     HPI:   Patient presents here today for above reason.     Ms. Dyer is a 79-year-old female presenting today for Medicare wellness visit.  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  She was doing quite well on Eliquis.  She was on baby aspirin as well.  Otherwise have blood work done here for review her sugars are little elevated but historically A1c is fairly well-controlled.  Denies any polyuria polydipsia.  Rest of labs are stable she was just recently increased to 80 mg of Crestor about 2-4 weeks ago her repeat cholesterol still elevated.    The patient's Health Maintenance was reviewed and the following appears to be due at this time:   Health Maintenance Due   Topic Date Due    Hepatitis C Screening  Never done    TETANUS VACCINE  Never done    DEXA Scan  Never done    Shingles Vaccine (1 of 2) Never done    RSV Vaccine (Age 60+ and Pregnant patients) (1 - 1-dose 60+ series) Never done    Pneumococcal Vaccines (Age 65+) (1 of 1 - PCV) Never done    Influenza Vaccine (1) 09/01/2024    COVID-19 Vaccine (3 - 2023-24 season) 09/01/2024        Past Medical History:  Past Medical History:   Diagnosis Date    Autoimmune thyroiditis     HLD (hyperlipidemia)     Hypertension      Past Surgical History:   Procedure Laterality Date    CATARACT EXTRACTION      CHOLECYSTECTOMY      EMBOLECTOMY OR THROMBECTOMY, BLOOD VESSEL, UPPER EXTREMITY Right 12/7/2023    Procedure: EMBOLECTOMY OR THROMBECTOMY, BLOOD VESSEL, UPPER EXTREMITY;  Surgeon: Jh Beckford MD;  Location: [facility];  Service: Peripheral Vascular;  Laterality: Right;    SURGICAL REMOVAL OF BONE SPUR      TONSILLECTOMY      TOTAL ABDOMINAL HYSTERECTOMY       Review of patient's allergies indicates:   Allergen Reactions     Codeine      Other reaction(s): Vomiting    Penicillins      Other reaction(s): Vomiting     Current Outpatient Medications on File Prior to Visit   Medication Sig Dispense Refill    aspirin (ECOTRIN) 81 MG EC tablet Take 1 tablet by mouth once daily.      ELIQUIS 5 mg Tab SMARTSI Tablet(s) By Mouth Morning-Night      levothyroxine (SYNTHROID) 25 MCG tablet TAKE 1 TABLET BY MOUTH DAILY 90 tablet 3    metoprolol succinate (TOPROL-XL) 100 MG 24 hr tablet Take 100 mg by mouth once daily.      pantoprazole (PROTONIX) 20 MG tablet Take 1 tablet (20 mg total) by mouth once daily. 90 tablet 3    rosuvastatin (CRESTOR) 40 MG Tab Take 40 mg by mouth once daily.      vitamin D (VITAMIN D3) 1000 units Tab Take 1,000 Units by mouth once daily. 1 tablet by mouth once daily      [DISCONTINUED] amLODIPine (NORVASC) 5 MG tablet Take 5 mg by mouth once daily. (Patient not taking: Reported on 9/10/2024)      [DISCONTINUED] carvediloL (COREG) 6.25 MG tablet Take 6.25 mg by mouth 2 (two) times daily with meals.      [DISCONTINUED] estrogens, conjugated, (PREMARIN) 0.45 MG tablet TAKE 1 TABLET BY MOUTH DAILY (Patient not taking: Reported on 9/10/2024) 90 tablet 3    [DISCONTINUED] losartan (COZAAR) 25 MG tablet Take 25 mg by mouth. (Patient not taking: Reported on 9/10/2024)      [DISCONTINUED] rosuvastatin (CRESTOR) 20 MG tablet TAKE 1 TABLET BY MOUTH DAILY 90 tablet 3     No current facility-administered medications on file prior to visit.     Social History     Socioeconomic History    Marital status:    Tobacco Use    Smoking status: Never     Passive exposure: Never    Smokeless tobacco: Never   Substance and Sexual Activity    Alcohol use: Never    Drug use: Never     Social Determinants of Health     Financial Resource Strain: Low Risk  (2022)    Overall Financial Resource Strain (CARDIA)     Difficulty of Paying Living Expenses: Not hard at all   Food Insecurity: No Food Insecurity (2022)    Hunger Vital  Sign     Worried About Running Out of Food in the Last Year: Never true     Ran Out of Food in the Last Year: Never true   Transportation Needs: No Transportation Needs (8/30/2022)    PRAPARE - Transportation     Lack of Transportation (Medical): No     Lack of Transportation (Non-Medical): No   Stress: No Stress Concern Present (8/30/2022)    [employer] - Occupational Stress Questionnaire     Feeling of Stress : Not at all   Housing Stability: Low Risk  (8/30/2022)    Housing Stability Vital Sign     Unable to Pay for Housing in the Last Year: No     Number of Places Lived in the Last Year: 1     Unstable Housing in the Last Year: No     No family history on file.    ROS:   Review of Systems  Constitutional: No weight gain, No fever, No chills, No fatigue.   Eyes: No blurring, No visual disturbances.   Ear/Nose/Mouth/Throat: No decreased hearing, No ear pain, No nasal congestion, No sore throat.   Respiratory: No shortness of breath, No cough, No wheezing.   Cardiovascular: No chest pain, No palpitations, No peripheral edema.   Gastrointestinal: No nausea, No vomiting, No diarrhea, No constipation, No abdominal pain.   Genitourinary: No dysuria, No hematuria.   Hematology/Lymphatics: No bruising tendency, No bleeding tendency, No swollen lymph glands.   Endocrine: No excessive thirst, No polyuria, No excessive hunger.   Musculoskeletal: No joint pain, No muscle pain, No decreased range of motion.   Integumentary: No rash, No pruritus.   Neurologic: No abnormal balance, No confusion, No headache.   Psychiatric: No anxiety, No depression, Not suicidal, No hallucinations.        A comprehensive HEALTH RISK ASSESSMENT was completed today. Results are summarized below:    There are NO EMOTIONAL/SOCIAL CONCERNS identified on today's screening for Social Isolation, Depression and Anxiety.    There are NO COGNITIVE FUNCTION CONCERNS identified on today's screening.  There are NO FUNCTIONAL OR  "SAFETY CONCERNS were identified on today's screening for Physical Symptoms, Nutritional, Cognitive Function, Home Safety/Living Situation, Fall Risk, Activities of Daily Living, Independent Activities of Daily Living, Physical Activity, Timed Up and Go test and Whisper test.   The patient reports NO OPIOID PRESCRIPTIONS. This was confirmed through medication reconciliation and the [facility] website.    The patient is NOT A TOBACCO USER.        All Questions regarding food, transportation or housing were not answered today.     Vitals/PE:   /80 (BP Location: Left arm, Patient Position: Sitting)   Pulse 63   Resp 18   Ht 5' 5" (1.651 m)   Wt 76.2 kg (168 lb)   SpO2 98%   BMI 27.96 kg/m²   Physical Exam    General: Alert and oriented, No acute distress.   Eye: Normal conjunctiva without exudate.  HENMT: Normocephalic/AT, Normal hearing, Oral mucosa is moist and pink   Neck: No goiter visualized.   Respiratory: Lungs CTAB, Respirations are non-labored, Breath sounds are equal, Symmetrical chest wall expansion.  Cardiovascular: Normal rate, Regular rhythm, No murmur, No edema.   Gastrointestinal: Non-distended.   Genitourinary: Deferred.  Musculoskeletal: Normal ROM, Normal gait, No deformities or amputations.  Integumentary: Warm, Dry, Intact. No diaphoresis, or flushing.  Neurologic: No focal deficits, Cranial Nerves II-XII are grossly intact.   Psychiatric: Cooperative, Appropriate mood & affect, Normal judgment, Non-suicidal.             No data to display                  9/10/2024     1:20 PM 1/30/2024     1:00 PM 9/6/2023     1:40 PM 8/30/2022     2:20 PM   Fall Risk Assessment - Outpatient   Mobility Status Ambulatory Ambulatory Ambulatory Ambulatory   Number of falls 0 0 0 0   Identified as fall risk False False False False                Assessment/Plan:       1. Mixed hyperlipidemia    2. Hypertension, unspecified type    3. Chronic kidney disease, stage 3a    4. 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 Hypothyroidism due to non-medication exogenous substances    6. Autoimmune thyroiditis    7. IFG (impaired fasting glucose)         Plan:   1.  Her Crestor was increased to 80 mg.  May benefit from the addition of Zetia.  But will leave this up to her cardiologist.    2. Continue Eliquis for stroke prevention.  3. Rest of labs all within normal limits  4.  Age-appropriate screening up-to-date   Here for follow-up.  Repeat A1c at next visit.      Education and counseling done face to face regarding medical conditions and plan. Contact office if new symptoms develop. Should any symptoms ever significantly worsen seek emergency medical attention/go to ER. Follow up at least yearly for wellness or sooner PRN. Nurse to call patient with any results. The patient is receptive, expresses understanding and is agreeable to plan. All questions have been answered.    No follow-ups on file.      Medicare Annual Wellness and Personalized Prevention Plan:   Fall Risk + Home Safety + Hearing Impairment + Depression Screen + Cognitive Impairment Screen + Health Risk Assessment all reviewed.    Advance Care Planning   I attest to discussing Advance Care Planning with patient and/or family member.  Education was provided including the importance of the Health Care Power of , Advance Directives, and/or [place] documentation.  The patient expressed understanding to the importance of this information and discussion.  Length of ACP conversation in minutes:

## 2024-11-02 DIAGNOSIS — E03.9 HYPOTHYROIDISM, UNSPECIFIED TYPE: ICD-10-CM

## 2024-11-04 RX ORDER — LEVOTHYROXINE SODIUM 25 UG/1
25 TABLET ORAL
Qty: 90 TABLET | Refills: 3 | Status: SHIPPED | OUTPATIENT
Start: 2024-11-04

## 2025-01-26 DIAGNOSIS — K21.9 GASTROESOPHAGEAL REFLUX DISEASE, UNSPECIFIED WHETHER ESOPHAGITIS PRESENT: ICD-10-CM

## 2025-01-27 RX ORDER — PANTOPRAZOLE SODIUM 20 MG/1
20 TABLET, DELAYED RELEASE ORAL
Qty: 90 TABLET | Refills: 3 | Status: SHIPPED | OUTPATIENT
Start: 2025-01-27

## 2025-03-10 ENCOUNTER — TELEPHONE (OUTPATIENT)
Dept: INTERNAL MEDICINE | Facility: CLINIC | Age: 80
End: 2025-03-10
Payer: MEDICARE

## 2025-03-10 DIAGNOSIS — N18.31 CHRONIC KIDNEY DISEASE, STAGE 3A: ICD-10-CM

## 2025-03-10 DIAGNOSIS — I70.0 AORTIC ATHEROSCLEROSIS: ICD-10-CM

## 2025-03-10 DIAGNOSIS — E03.2 HYPOTHYROIDISM DUE TO NON-MEDICATION EXOGENOUS SUBSTANCES: Primary | ICD-10-CM

## 2025-03-10 DIAGNOSIS — I10 HYPERTENSION, UNSPECIFIED TYPE: ICD-10-CM

## 2025-03-10 DIAGNOSIS — E78.5 HYPERLIPIDEMIA, UNSPECIFIED HYPERLIPIDEMIA TYPE: ICD-10-CM

## 2025-03-10 NOTE — TELEPHONE ENCOUNTER
----- Message from Nurse Casanova sent at 3/10/2025 10:14 AM CDT -----  Regarding: shemar wilhelm 3/18 @1:20  Are there any outstanding tasks in patient chart? Needs fasting labsIs there documentation of outstanding tasks in patient chart? noHas patient been to the ER, urgent care, or another physician since last visit?Has patient done any blood work or x-rays since last visit?5. PLEASE HAVE PATIENT BRING MEDICATION LIST OR BOTTLES TO EVERY OFFICE VISIT

## 2025-05-01 ENCOUNTER — OFFICE VISIT (OUTPATIENT)
Dept: INTERNAL MEDICINE | Facility: CLINIC | Age: 80
End: 2025-05-01
Payer: MEDICARE

## 2025-05-01 VITALS
BODY MASS INDEX: 28.82 KG/M2 | HEART RATE: 143 BPM | OXYGEN SATURATION: 97 % | WEIGHT: 173 LBS | RESPIRATION RATE: 16 BRPM | SYSTOLIC BLOOD PRESSURE: 130 MMHG | DIASTOLIC BLOOD PRESSURE: 78 MMHG | HEIGHT: 65 IN

## 2025-05-01 DIAGNOSIS — I48.11 LONGSTANDING PERSISTENT ATRIAL FIBRILLATION: Primary | ICD-10-CM

## 2025-05-01 PROBLEM — M46.1 INFLAMMATION OF SACROILIAC JOINT: Status: RESOLVED | Noted: 2023-12-07 | Resolved: 2025-05-01

## 2025-05-01 PROBLEM — N18.31 CHRONIC KIDNEY DISEASE, STAGE 3A: Status: RESOLVED | Noted: 2023-09-06 | Resolved: 2025-05-01

## 2025-05-01 PROBLEM — I74.2: Status: RESOLVED | Noted: 2023-12-07 | Resolved: 2025-05-01

## 2025-05-01 PROCEDURE — 99213 OFFICE O/P EST LOW 20 MIN: CPT | Mod: ,,, | Performed by: INTERNAL MEDICINE

## 2025-05-01 RX ORDER — DILTIAZEM HYDROCHLORIDE 180 MG/1
180 CAPSULE, COATED, EXTENDED RELEASE ORAL DAILY
Qty: 30 CAPSULE | Refills: 11 | Status: SHIPPED | OUTPATIENT
Start: 2025-05-01 | End: 2026-05-01

## 2025-05-01 NOTE — PROGRESS NOTES
Patient ID: Manisha Kirk is a 80 y.o. female.    Chief Complaint: Follow-up (C/o sob when walking and bilateral arm pain hx of blood clot and stroke/Seeing dr cheung this month to discuss ablation for a fib)      HPI:   Patient presents here today for above reason.     Manisha is a 80-year-old female presenting today in follow-up.  She states she did not feel well complains of some shortness for breath with the exertion and bilateral arm pain.  She has a history of stroke in the past she is anticoagulated.  EKG in the office today shows AFib with RVR and ventricular rate of 138 beats per minute.  No syncope or near-syncope.  EKG shows possible acute MI however is not evident on the actual tracing.  No ST changes.  Does see Cardiology normally in his tentatively scheduled at the end of the month for evaluation by electrophysiology.    The patient's Health Maintenance was reviewed and the following appears to be due at this time:   Health Maintenance Due   Topic Date Due    TETANUS VACCINE  Never done    DEXA Scan  Never done    Shingles Vaccine (1 of 2) Never done    Pneumococcal Vaccines (Age 50+) (1 of 1 - PCV) Never done    RSV Vaccine (Age 60+ and Pregnant patients) (1 - 1-dose 75+ series) Never done    COVID-19 Vaccine (3 - 2024-25 season) 09/01/2024    Hemoglobin A1c (Prediabetes)  12/08/2024        Past Medical History:  Past Medical History:   Diagnosis Date    Autoimmune thyroiditis     HLD (hyperlipidemia)     Hypertension      Past Surgical History:   Procedure Laterality Date    CATARACT EXTRACTION      CHOLECYSTECTOMY      EMBOLECTOMY OR THROMBECTOMY, BLOOD VESSEL, UPPER EXTREMITY Right 12/7/2023    Procedure: EMBOLECTOMY OR THROMBECTOMY, BLOOD VESSEL, UPPER EXTREMITY;  Surgeon: Jh Beckford MD;  Location: [facility];  Service: Peripheral Vascular;  Laterality: Right;    SURGICAL REMOVAL OF BONE SPUR      TONSILLECTOMY      TOTAL ABDOMINAL HYSTERECTOMY       Review of patient's allergies indicates:  "  Allergen Reactions    Codeine      Other reaction(s): Vomiting    Penicillins      Other reaction(s): Vomiting     Medications Ordered Prior to Encounter[1]  Social History[2]  No family history on file.    ROS:   Comprehensive review of systems was performed and is negative except as noted above    Vitals/PE:   /78 (BP Location: Left arm, Patient Position: Sitting)   Pulse (!) 143   Resp 16   Ht 5' 5" (1.651 m)   Wt 78.5 kg (173 lb)   SpO2 97%   BMI 28.79 kg/m²   Physical Exam    General: Alert and oriented, No acute distress.   Eye: Normal conjunctiva without exudate.  HENMT: Normocephalic/AT, Normal hearing, Oral mucosa is moist and pink   Neck: No goiter visualized.   Respiratory: Lungs CTAB, Respirations are non-labored, Breath sounds are equal, Symmetrical chest wall expansion.  Cardiovascular: Normal rate, Regular rhythm, No murmur, No edema.   Gastrointestinal: Non-distended.   Genitourinary: Deferred.  Musculoskeletal: Normal ROM, Normal gait, No deformities or amputations.  Integumentary: Warm, Dry, Intact. No diaphoresis, or flushing.  Neurologic: No focal deficits, Cranial Nerves II-XII are grossly intact.   Psychiatric: Cooperative, Appropriate mood & affect, Normal judgment, Non-suicidal.    Assessment/Plan:       1. Longstanding persistent atrial fibrillation           EKG shows AFib with RVR with a ventricular rate of 138 beats per minute.  She is anticoagulated in his all also on 100 mg of Toprol.  Will place her on diltiazem 180 daily monitor blood pressure.  Offered hospital admission.  No chest pain but just has shortness a breath with the exertion.  Resting symptoms resolved.  EKG showed AFib with RVR and acute MI however there was no ST changes along the tracings.      Education and counseling done face to face regarding medical conditions and plan. Contact office if new symptoms develop. Should any symptoms ever significantly worsen seek emergency medical attention/go to ER. " Follow up at least yearly for wellness or sooner PRN. Nurse to call patient with any results. The patient is receptive, expresses understanding and is agreeable to plan. All questions have been answered.    No follow-ups on file.             [1]   Current Outpatient Medications on File Prior to Visit   Medication Sig Dispense Refill    aspirin (ECOTRIN) 81 MG EC tablet Take 1 tablet by mouth once daily.      ELIQUIS 5 mg Tab SMARTSI Tablet(s) By Mouth Morning-Night      levothyroxine (SYNTHROID) 25 MCG tablet TAKE 1 TABLET BY MOUTH DAILY 90 tablet 3    metoprolol succinate (TOPROL-XL) 100 MG 24 hr tablet Take 100 mg by mouth once daily.      pantoprazole (PROTONIX) 20 MG tablet TAKE 1 TABLET(20 MG) BY MOUTH EVERY DAY 90 tablet 3    rosuvastatin (CRESTOR) 40 MG Tab Take 40 mg by mouth once daily.      vitamin D (VITAMIN D3) 1000 units Tab Take 1,000 Units by mouth once daily. 1 tablet by mouth once daily       No current facility-administered medications on file prior to visit.   [2]   Social History  Socioeconomic History    Marital status:    Tobacco Use    Smoking status: Never     Passive exposure: Never    Smokeless tobacco: Never   Substance and Sexual Activity    Alcohol use: Never    Drug use: Never     Social Drivers of Health     Financial Resource Strain: Low Risk  (2022)    Overall Financial Resource Strain (CARDIA)     Difficulty of Paying Living Expenses: Not hard at all   Food Insecurity: No Food Insecurity (2022)    Hunger Vital Sign     Worried About Running Out of Food in the Last Year: Never true     Ran Out of Food in the Last Year: Never true   Transportation Needs: No Transportation Needs (2022)    PRAPARE - Transportation     Lack of Transportation (Medical): No     Lack of Transportation (Non-Medical): No   Stress: No Stress Concern Present (2022)    [employer] - Occupational Stress Questionnaire     Feeling of Stress : Not at all    Housing Stability: Low Risk  (8/30/2022)    Housing Stability Vital Sign     Unable to Pay for Housing in the Last Year: No     Number of Places Lived in the Last Year: 1     Unstable Housing in the Last Year: No

## 2025-06-09 DIAGNOSIS — G47.33 OSA (OBSTRUCTIVE SLEEP APNEA): Primary | ICD-10-CM

## 2025-06-13 ENCOUNTER — PATIENT MESSAGE (OUTPATIENT)
Dept: NEUROLOGY | Facility: CLINIC | Age: 80
End: 2025-06-13
Payer: MEDICARE

## 2025-06-26 ENCOUNTER — OFFICE VISIT (OUTPATIENT)
Facility: CLINIC | Age: 80
End: 2025-06-26
Payer: MEDICARE

## 2025-06-26 VITALS
SYSTOLIC BLOOD PRESSURE: 122 MMHG | WEIGHT: 173 LBS | BODY MASS INDEX: 28.82 KG/M2 | HEIGHT: 65 IN | DIASTOLIC BLOOD PRESSURE: 78 MMHG

## 2025-06-26 DIAGNOSIS — G47.33 OSA (OBSTRUCTIVE SLEEP APNEA): Primary | ICD-10-CM

## 2025-06-26 DIAGNOSIS — R06.83 SNORING: ICD-10-CM

## 2025-06-26 DIAGNOSIS — I10 HYPERTENSION, UNSPECIFIED TYPE: ICD-10-CM

## 2025-06-26 DIAGNOSIS — I48.0 PAROXYSMAL ATRIAL FIBRILLATION: ICD-10-CM

## 2025-06-26 PROCEDURE — 99213 OFFICE O/P EST LOW 20 MIN: CPT | Mod: PBBFAC

## 2025-06-26 PROCEDURE — 99999 PR PBB SHADOW E&M-EST. PATIENT-LVL III: CPT | Mod: PBBFAC,,,

## 2025-06-26 RX ORDER — FLECAINIDE ACETATE 50 MG/1
50 TABLET ORAL
COMMUNITY
Start: 2025-05-14

## 2025-06-26 NOTE — PROGRESS NOTES
Neurology Clinic  New Sleep    SUBJECTIVE:  Patient ID: Manisha Kirk is a 80 y.o. female.    Chief Complaint: New patient referred by Dr. Piter Nolan for NARINDER evaluation    History of Present Illness:     New patient referred by Dr. Piter Nolan for NARINDER evaluation  Hx CVA, paroxysmal afib, HTN. Recently had a cardioversion done w/ Dr. Bueno and has been in NSR since.   Lives alone x40 years.    Pertinent positives/ negatives:  Snoring:  yes--she thinks, often wakes up with a dry mouth  Witnessed apnea: no  Awaken from sleep, gasping for air: no  Frequent awakenings: yes, 3-4 times d/t nocturia  Vivid dreams: no  Excessive daytime sleepiness: no  Nap during the day: yes, for 30mins/day   Sleep study in the past: no     Reports going to sleep around 10pm-1am and awakening at 7 am. Sleeps about 6-7 hrs/night. Has diff falling asleep.   Awakens refreshed. Occasionally has EDS if she doesn't sleep well at night.         6/26/2025   EPWORTH SLEEPINESS SCALE   Sitting and reading 0   Watching TV 0   Sitting, inactive in a public place (e.g. a theatre or a meeting) 0   As a passenger in a car for an hour without a break 0   Lying down to rest in the afternoon when circumstances permit 0   Sitting and talking to someone 0   Sitting quietly after a lunch without alcohol 0   In a car, while stopped for a few minutes in traffic 0   Total score 0       ROS: as per HPI, otherwise pertinent systems review is negative          Past Medical History:   Diagnosis Date    Autoimmune thyroiditis     HLD (hyperlipidemia)     Hypertension        Past Surgical History:   Procedure Laterality Date    CATARACT EXTRACTION      CHOLECYSTECTOMY      EMBOLECTOMY OR THROMBECTOMY, BLOOD VESSEL, UPPER EXTREMITY Right 12/7/2023    Procedure: EMBOLECTOMY OR THROMBECTOMY, BLOOD VESSEL, UPPER EXTREMITY;  Surgeon: Jh Beckford MD;  Location: [facility];  Service: Peripheral Vascular;  Laterality: Right;    SURGICAL REMOVAL OF BONE SPUR    "   TONSILLECTOMY      TOTAL ABDOMINAL HYSTERECTOMY         No family history on file.    Social History     Socioeconomic History    Marital status:    Tobacco Use    Smoking status: Never     Passive exposure: Never    Smokeless tobacco: Never   Substance and Sexual Activity    Alcohol use: Never    Drug use: Never     Review of patient's allergies indicates:   Allergen Reactions    Codeine      Other reaction(s): Vomiting    Penicillins      Other reaction(s): Vomiting       Current Outpatient Medications   Medication Instructions    aspirin (ECOTRIN) 81 MG EC tablet 1 tablet, Daily    diltiaZEM (CARDIZEM CD) 180 mg, Oral, Daily    ELIQUIS 5 mg Tab SMARTSI Tablet(s) By Mouth Morning-Night    flecainide (TAMBOCOR) 50 mg    levothyroxine (SYNTHROID) 25 mcg, Oral    metoprolol succinate (TOPROL-XL) 100 mg, Daily    pantoprazole (PROTONIX) 20 mg, Oral    rosuvastatin (CRESTOR) 40 mg, Daily    vitamin D (VITAMIN D3) 1,000 Units, Daily       OBJECTIVE:  Vitals:  /78 (BP Location: Left arm, Patient Position: Sitting)   Ht 5' 5" (1.651 m)   Wt 78.5 kg (173 lb)   BMI 28.79 kg/m²      Neck Circumference: 15 in     Physical Exam   Constitutional: she appears well-developed and well-nourished.    Accompanied by - grandson  appearance - well appearing, no apparent distress, unassisted  oropharynx - Mallampati score class 4, ok dentition  heart - regular rate and rhythm auscultated   lungs - pulmonary effort is normal. clear breath sounds  skin- no obvious lesions noted    Neurologic Exam:  Cortical function - The patient is alert, attentive, and oriented; cooperative with exam, good eye contact  Speech - clear and fluent   cranial nerves:  CN 2 - visual fields are full to confrontation.   CN 3, 4, 6 EOMs - normal. No ptosis or lateral gaze deviation  CN 7 - no face asymmetry; normal eye closure and smile  CN 8 - hearing is grossly normal  CN 9, 10 - palate elevates symmetrically.   CN 12 tongue - protrudes " midline with normal movements and no atrophy  Motor - No pronator drift. Muscle bulk and tone normal. No lateralizing or focal deficits noted  Gait - unassisted, posture upright. gait is steady with normal steps, arm swing and turning.   Sensation - vib decreased bilateral toes  Coordination - finger to nose intact. Romberg absent    Review of Data:   Prior/outside notes reviewed.       ASSESSMENT/PLAN:  1. NARINDER (obstructive sleep apnea)  2. Hypertension, unspecified type  3. Paroxysmal atrial fibrillation  4. Snoring    HST ordered, and if PAP needed, patient unwilling to come into the lab for PAP night. Autopap can be ordered.     Sleep apnea and it's attendant risks discussed.  In lab PSG vs home sleep study option and insurance constraints discussed.  Potential need for treatment of NARINDER discussed including CPAP or Bilevel  PAP.   Alternatives to PAP discussed if PAP not ultimately tolerated.  Risks of excessive daytime sleepiness/drowsy driving discussed.  Weight loss discussed if patient is overweight.   Importance of healthy diet, regular exercise and sleep hygiene discussed    Orders Placed This Encounter   Procedures    Home Sleep Study       Follow up TBD after HST    Questions and concerns were sought and answered to the patient's stated verbal satisfaction.    The patient verbalizes understanding and agreement with the above stated treatment plan.   Items discussed include acute and/or chronic neurological, sleep, or other issues and their attendant differential diagnoses.  Potential for additional testing, treatment options, and prognosis also discussed.  Dr. Venkatesh Dumont available during encounter.    Vernell Méndez, MSN, APRN, FNP-C  [facility]  [phone]

## 2025-07-08 ENCOUNTER — PROCEDURE VISIT (OUTPATIENT)
Dept: SLEEP MEDICINE | Facility: HOSPITAL | Age: 80
End: 2025-07-08
Payer: MEDICARE

## 2025-07-08 DIAGNOSIS — I10 HYPERTENSION, UNSPECIFIED TYPE: ICD-10-CM

## 2025-07-08 DIAGNOSIS — R06.83 SNORING: ICD-10-CM

## 2025-07-08 DIAGNOSIS — I48.0 PAROXYSMAL ATRIAL FIBRILLATION: ICD-10-CM

## 2025-07-08 DIAGNOSIS — G47.33 OSA (OBSTRUCTIVE SLEEP APNEA): ICD-10-CM

## 2025-07-08 PROCEDURE — 95806 SLEEP STUDY UNATT&RESP EFFT: CPT | Mod: 26,,, | Performed by: SPECIALIST

## 2025-07-08 PROCEDURE — G0399 HOME SLEEP TEST/TYPE 3 PORTA: HCPCS

## 2025-07-16 ENCOUNTER — PATIENT MESSAGE (OUTPATIENT)
Dept: NEUROLOGY | Facility: CLINIC | Age: 80
End: 2025-07-16
Payer: MEDICARE

## 2025-08-01 ENCOUNTER — OFFICE VISIT (OUTPATIENT)
Facility: CLINIC | Age: 80
End: 2025-08-01
Payer: MEDICARE

## 2025-08-01 VITALS
SYSTOLIC BLOOD PRESSURE: 122 MMHG | BODY MASS INDEX: 28.82 KG/M2 | OXYGEN SATURATION: 96 % | DIASTOLIC BLOOD PRESSURE: 70 MMHG | HEART RATE: 45 BPM | WEIGHT: 173 LBS | HEIGHT: 65 IN

## 2025-08-01 DIAGNOSIS — R06.83 SNORING: ICD-10-CM

## 2025-08-01 DIAGNOSIS — G47.33 OSA (OBSTRUCTIVE SLEEP APNEA): Primary | ICD-10-CM

## 2025-08-01 DIAGNOSIS — I48.0 PAROXYSMAL ATRIAL FIBRILLATION: ICD-10-CM

## 2025-08-01 DIAGNOSIS — I10 HYPERTENSION, UNSPECIFIED TYPE: ICD-10-CM

## 2025-08-01 PROCEDURE — 99999 PR PBB SHADOW E&M-EST. PATIENT-LVL III: CPT | Mod: PBBFAC,,,

## 2025-08-01 PROCEDURE — 99213 OFFICE O/P EST LOW 20 MIN: CPT | Mod: PBBFAC

## 2025-08-01 NOTE — PROGRESS NOTES
Neurology    Established NARINDER Patient   SUBJECTIVE:    Patient ID: Manisha ARAIZA Kirk is a 80 y.o. female.    Chief Complaint: NARINDER f/u    History of Present Illness:     Pt presents to review HST results; no changes since last visit.    HST results:     HST AMBER: 13  Minimum SaO2: 89 %  Time SaO2 value is less than 88%: 0 min          6/26/2025     1:43 PM   EPWORTH SLEEPINESS SCALE   Sitting and reading 0   Watching TV 0   Sitting, inactive in a public place (e.g. a theatre or a meeting) 0   As a passenger in a car for an hour without a break 0   Lying down to rest in the afternoon when circumstances permit 0   Sitting and talking to someone 0   Sitting quietly after a lunch without alcohol 0   In a car, while stopped for a few minutes in traffic 0   Total score 0       Review of Systems -  as per HPI, otherwise pertinent systems review is negative           Past Medical History:   Diagnosis Date    Autoimmune thyroiditis     HLD (hyperlipidemia)     Hypertension        Past Surgical History:   Procedure Laterality Date    CATARACT EXTRACTION      CHOLECYSTECTOMY      EMBOLECTOMY OR THROMBECTOMY, BLOOD VESSEL, UPPER EXTREMITY Right 12/7/2023    Procedure: EMBOLECTOMY OR THROMBECTOMY, BLOOD VESSEL, UPPER EXTREMITY;  Surgeon: Jh Beckford MD;  Location: [facility];  Service: Peripheral Vascular;  Laterality: Right;    SURGICAL REMOVAL OF BONE SPUR      TONSILLECTOMY      TOTAL ABDOMINAL HYSTERECTOMY         No family history on file.    Social History     Socioeconomic History    Marital status:    Tobacco Use    Smoking status: Never     Passive exposure: Never    Smokeless tobacco: Never   Substance and Sexual Activity    Alcohol use: Never    Drug use: Never       Review of patient's allergies indicates:   Allergen Reactions    Codeine      Other reaction(s): Vomiting    Penicillins      Other reaction(s): Vomiting       Current Outpatient Medications   Medication Instructions    aspirin (ECOTRIN) 81 MG EC  "tablet 1 tablet, Daily    diltiaZEM (CARDIZEM CD) 180 mg, Oral, Daily    ELIQUIS 5 mg Tab SMARTSI Tablet(s) By Mouth Morning-Night    flecainide (TAMBOCOR) 50 mg    levothyroxine (SYNTHROID) 25 mcg, Oral    metoprolol succinate (TOPROL-XL) 100 mg, Daily    pantoprazole (PROTONIX) 20 mg, Oral    rosuvastatin (CRESTOR) 40 mg, Daily    vitamin D (VITAMIN D3) 1,000 Units, Daily       OBJECTIVE:    Vitals:  Visit Vitals  /70   Pulse (!) 45   Ht 5' 5" (1.651 m)   Wt 78.5 kg (173 lb)   SpO2 96%   BMI 28.79 kg/m²       Physical Exam:  Constitutional  she appears well-developed and well-nourished. she is well groomed.    Accompanied by - self  Appearance - well appearing, no apparent distress, unassisted  Heart - RRR auscultated without murmur  Lungs - CTA, pulmonary effort normal  Skin- no obvious lesions noted    Neurologic  Cortical function - The patient is alert, attentive   Speech - clear   Cranial nerves:  CN 3, 4, 6 EOMs - normal. No ptosis, nystagmus, or lateral gaze deviation  CN 7 - no face asymmetry   CN 8 - hearing is grossly normal  Gait - unassisted; posture upright. Gait is steady with normal steps    Review of Data:     HST summary:      ASSESSMENT/PLAN:    1. NARINDER (obstructive sleep apnea)    2. Snoring    3. Paroxysmal atrial fibrillation    4. Hypertension, unspecified type      Reviewed HST results with pt. All questions answered    Pt has mild NARINDER with AMBER of 13 however O2 did not drop below 89%. Given the mild nature of apnea and patient's advanced age, PAP therapy not recommended.     PRN f/u      Questions and concerns were sought and answered to the patient's stated verbal satisfaction.    The patient verbalizes understanding and agreement with the above stated treatment plan.   Items discussed include acute and/or chronic neurological, sleep, or other issues and their attendant differential diagnoses.  Potential for additional testing, treatment options, and prognosis also discussed.  Dr." Venkatesh Dumont available during encounter.    Vernell Méndez, MSN, APRN, FNP-C  [facility]  [phone]

## (undated) DEVICE — COVER PROBE US 5.5X58L NON LTX

## (undated) DEVICE — SUT 4-0 12-18IN SILK BLACK

## (undated) DEVICE — SUT PROLENE 6-0 BV-1 30IN

## (undated) DEVICE — DRESSING ABSRBNT ISLAND 3.6X8

## (undated) DEVICE — LOOP VES MAXI RD1.3X.9MM 18IN

## (undated) DEVICE — SUT MCRYL PLUS 4-0 PS2 27IN

## (undated) DEVICE — SUT 2-0 12-18IN SILK

## (undated) DEVICE — HEMOSTAT SURGICEL FIBRLR 2X4IN

## (undated) DEVICE — CATH EMB FGRTY 2FR 1 LUM 40CM

## (undated) DEVICE — CATH EMB FGRTY 3FR 1 LUM 40CM

## (undated) DEVICE — CLIP LIGATING HEMOCLP SMALL

## (undated) DEVICE — DRAPE INCISE IOBAN 2 23X23IN

## (undated) DEVICE — PROBE DOPPLER VTI DISP 8MHZ

## (undated) DEVICE — SUT VICRYL 3-0 27 SH

## (undated) DEVICE — KIT SURGICAL TURNOVER

## (undated) DEVICE — SOL NORMAL USPCA 0.9%

## (undated) DEVICE — SYR LUER LOCK 1CC

## (undated) DEVICE — Device

## (undated) DEVICE — SUT 3-0 12-18IN SILK

## (undated) DEVICE — SHEET XLGE DRAPE

## (undated) DEVICE — APPLICATOR CHLORAPREP ORN 26ML

## (undated) DEVICE — DRESSING TELFA + BARR 4X6IN

## (undated) DEVICE — SYR ONLY LUER LOCK 20CC